# Patient Record
Sex: FEMALE | Race: WHITE | NOT HISPANIC OR LATINO | Employment: UNEMPLOYED | URBAN - METROPOLITAN AREA
[De-identification: names, ages, dates, MRNs, and addresses within clinical notes are randomized per-mention and may not be internally consistent; named-entity substitution may affect disease eponyms.]

---

## 2022-07-30 ENCOUNTER — HOSPITAL ENCOUNTER (EMERGENCY)
Facility: HOSPITAL | Age: 52
Discharge: HOME | End: 2022-07-30
Attending: EMERGENCY MEDICINE | Admitting: EMERGENCY MEDICINE
Payer: COMMERCIAL

## 2022-07-30 ENCOUNTER — APPOINTMENT (OUTPATIENT)
Dept: CT IMAGING | Facility: HOSPITAL | Age: 52
End: 2022-07-30
Attending: EMERGENCY MEDICINE
Payer: COMMERCIAL

## 2022-07-30 VITALS
HEIGHT: 65 IN | TEMPERATURE: 98.9 F | RESPIRATION RATE: 18 BRPM | OXYGEN SATURATION: 98 % | HEART RATE: 90 BPM | BODY MASS INDEX: 24.83 KG/M2 | WEIGHT: 149 LBS | DIASTOLIC BLOOD PRESSURE: 78 MMHG | SYSTOLIC BLOOD PRESSURE: 147 MMHG

## 2022-07-30 DIAGNOSIS — F10.920 ALCOHOLIC INTOXICATION WITHOUT COMPLICATION (H): ICD-10-CM

## 2022-07-30 LAB
AMPHETAMINES UR QL SCN: NORMAL
ANION GAP SERPL CALCULATED.3IONS-SCNC: 12 MMOL/L (ref 5–18)
BARBITURATES UR QL: NORMAL
BASOPHILS # BLD AUTO: 0 10E3/UL (ref 0–0.2)
BASOPHILS NFR BLD AUTO: 1 %
BENZODIAZ UR QL: NORMAL
BUN SERPL-MCNC: 5 MG/DL (ref 8–22)
CALCIUM SERPL-MCNC: 8.5 MG/DL (ref 8.5–10.5)
CANNABINOIDS UR QL SCN: NORMAL
CHLORIDE BLD-SCNC: 109 MMOL/L (ref 98–107)
CO2 SERPL-SCNC: 24 MMOL/L (ref 22–31)
COCAINE UR QL: NORMAL
CREAT SERPL-MCNC: 0.72 MG/DL (ref 0.6–1.1)
CREAT UR-MCNC: 13 MG/DL
EOSINOPHIL # BLD AUTO: 0.1 10E3/UL (ref 0–0.7)
EOSINOPHIL NFR BLD AUTO: 1 %
ERYTHROCYTE [DISTWIDTH] IN BLOOD BY AUTOMATED COUNT: 13.8 % (ref 10–15)
ETHANOL SERPL-MCNC: 352 MG/DL
GFR SERPL CREATININE-BSD FRML MDRD: >90 ML/MIN/1.73M2
GLUCOSE BLD-MCNC: 96 MG/DL (ref 70–125)
HCG SERPL QL: NEGATIVE
HCT VFR BLD AUTO: 43.6 % (ref 35–47)
HGB BLD-MCNC: 15.3 G/DL (ref 11.7–15.7)
HOLD SPECIMEN: NORMAL
IMM GRANULOCYTES # BLD: 0 10E3/UL
IMM GRANULOCYTES NFR BLD: 0 %
LYMPHOCYTES # BLD AUTO: 1.9 10E3/UL (ref 0.8–5.3)
LYMPHOCYTES NFR BLD AUTO: 30 %
MCH RBC QN AUTO: 32.2 PG (ref 26.5–33)
MCHC RBC AUTO-ENTMCNC: 35.1 G/DL (ref 31.5–36.5)
MCV RBC AUTO: 92 FL (ref 78–100)
METHADONE UR QL SCN: NORMAL
MONOCYTES # BLD AUTO: 0.3 10E3/UL (ref 0–1.3)
MONOCYTES NFR BLD AUTO: 5 %
NEUTROPHILS # BLD AUTO: 4.2 10E3/UL (ref 1.6–8.3)
NEUTROPHILS NFR BLD AUTO: 63 %
NRBC # BLD AUTO: 0 10E3/UL
NRBC BLD AUTO-RTO: 0 /100
OPIATES UR QL SCN: NORMAL
OXYCODONE UR QL: NORMAL
PCP UR QL SCN: NORMAL
PLATELET # BLD AUTO: 302 10E3/UL (ref 150–450)
POTASSIUM BLD-SCNC: 3.4 MMOL/L (ref 3.5–5)
RBC # BLD AUTO: 4.75 10E6/UL (ref 3.8–5.2)
SARS-COV-2 RNA RESP QL NAA+PROBE: NEGATIVE
SODIUM SERPL-SCNC: 145 MMOL/L (ref 136–145)
WBC # BLD AUTO: 6.5 10E3/UL (ref 4–11)

## 2022-07-30 PROCEDURE — 87635 SARS-COV-2 COVID-19 AMP PRB: CPT | Performed by: EMERGENCY MEDICINE

## 2022-07-30 PROCEDURE — 90791 PSYCH DIAGNOSTIC EVALUATION: CPT

## 2022-07-30 PROCEDURE — 70450 CT HEAD/BRAIN W/O DYE: CPT

## 2022-07-30 PROCEDURE — 96360 HYDRATION IV INFUSION INIT: CPT

## 2022-07-30 PROCEDURE — 80048 BASIC METABOLIC PNL TOTAL CA: CPT | Performed by: EMERGENCY MEDICINE

## 2022-07-30 PROCEDURE — 82077 ASSAY SPEC XCP UR&BREATH IA: CPT | Performed by: EMERGENCY MEDICINE

## 2022-07-30 PROCEDURE — 96361 HYDRATE IV INFUSION ADD-ON: CPT

## 2022-07-30 PROCEDURE — 36415 COLL VENOUS BLD VENIPUNCTURE: CPT | Performed by: EMERGENCY MEDICINE

## 2022-07-30 PROCEDURE — C9803 HOPD COVID-19 SPEC COLLECT: HCPCS

## 2022-07-30 PROCEDURE — 85025 COMPLETE CBC W/AUTO DIFF WBC: CPT | Performed by: EMERGENCY MEDICINE

## 2022-07-30 PROCEDURE — 250N000013 HC RX MED GY IP 250 OP 250 PS 637: Performed by: EMERGENCY MEDICINE

## 2022-07-30 PROCEDURE — 80307 DRUG TEST PRSMV CHEM ANLYZR: CPT | Performed by: EMERGENCY MEDICINE

## 2022-07-30 PROCEDURE — 84703 CHORIONIC GONADOTROPIN ASSAY: CPT | Performed by: EMERGENCY MEDICINE

## 2022-07-30 PROCEDURE — 258N000003 HC RX IP 258 OP 636: Performed by: EMERGENCY MEDICINE

## 2022-07-30 PROCEDURE — 93005 ELECTROCARDIOGRAM TRACING: CPT | Performed by: EMERGENCY MEDICINE

## 2022-07-30 PROCEDURE — 99285 EMERGENCY DEPT VISIT HI MDM: CPT | Mod: 25

## 2022-07-30 RX ORDER — FOLIC ACID 1 MG/1
1 TABLET ORAL ONCE
Status: COMPLETED | OUTPATIENT
Start: 2022-07-30 | End: 2022-07-30

## 2022-07-30 RX ORDER — IBUPROFEN 600 MG/1
600 TABLET, FILM COATED ORAL ONCE
Status: COMPLETED | OUTPATIENT
Start: 2022-07-30 | End: 2022-07-30

## 2022-07-30 RX ADMIN — IBUPROFEN 600 MG: 600 TABLET ORAL at 21:18

## 2022-07-30 RX ADMIN — THIAMINE HCL TAB 100 MG 100 MG: 100 TAB at 17:46

## 2022-07-30 RX ADMIN — FOLIC ACID 1 MG: 1 TABLET ORAL at 17:46

## 2022-07-30 RX ADMIN — SODIUM CHLORIDE 1000 ML: 9 INJECTION, SOLUTION INTRAVENOUS at 17:39

## 2022-07-30 NOTE — ED PROVIDER NOTES
EMERGENCY DEPARTMENT ENCOUNTER      NAME: Holly Briseno  AGE: 52 year old female  YOB: 1970  MRN: [id number]  EVALUATION DATE & TIME: 2022  4:45 PM    PCP: No primary care provider on file.    ED PROVIDER: Michaelle Watson M.D.      Chief Complaint   Patient presents with     Suicidal     Alcohol Intoxication         FINAL IMPRESSION:  1. Alcoholic intoxication without complication (H)          ED COURSE & MEDICAL DECISION MAKING:    ED Course as of 22   Sat  Patient with recent EtOH abuse with h/o alcoholism with fall 2 weeks ago in the shower, CT head pending, and EtOH abuse ongoing with desire to die from alcohol use,   3 weeks from EtOH abuse, no other substances except for vaping. EtOH level pending, DEC paged, CT head pending, IVF and thiamine/folic acid underway, no signs of withdrawal present at this time reassuringly. Will closely monitor in ED, patient here voluntarily but holdable at this time.    Hcg negative, COVID19 negative and BMP and CBC WNL. EtOH 352, Utox negative, CT head negative. Awaiting sobriety and then plan to do DEC assessment with passive SI. Patient signed out to PM ED MD pending DEC disposition when patient becomes moreso sober.    I spoke with DEC  who performed full assessment of patient and notes patient without SI/HI, does want to come off of alcohol but not specifically by amdission/detox and wants to be disccharged to home, very forward thinking and DEC  spoke with family who provided collateral and will ensure safety, patient declined offered admission, DEC believes patient not holdable and should be discharged with outpatient follow up provided. Patient discharged after being provided with extensive anticipatory guidance and given return precautions, importance of PMD follow-up emphasized.       4:47 PM I met with the patient to gather history and to perform my initial exam. I discussed the plan  "for care while in the Emergency Department.      Pertinent Labs & Imaging studies reviewed. (See chart for details)    N95 worn  A face shield was worn also  COVID PPE      At the conclusion of the encounter I discussed the results of all of the tests and the disposition. The questions were answered. The patient or family acknowledged understanding and was agreeable with the care plan.     MEDICATIONS GIVEN IN THE EMERGENCY:  Medications   0.9% sodium chloride BOLUS (0 mLs Intravenous Stopped 7/30/22 2125)   thiamine (B-1) tablet 100 mg (100 mg Oral Given 7/30/22 1746)   folic acid (FOLVITE) tablet 1 mg (1 mg Oral Given 7/30/22 1746)   ibuprofen (ADVIL/MOTRIN) tablet 600 mg (600 mg Oral Given 7/30/22 2118)       NEW PRESCRIPTIONS STARTED AT TODAY'S ER VISIT  New Prescriptions    No medications on file          =================================================================    HPI      Holly Briseno is a 52 year old female with PMHx of alcoholism who presents to the ED today via EMS from home with suicidal ideation and alcohol intoxication.    Per EMS, they were called to patient's house where she lives with her daughter. Patient's  3 weeks ago passed away from alcoholism. Since she has been heavily drinking and has been making statements of wanting to die.     Per patient, she was a recovered alcoholic who relapsed a couple of months ago. However, following the passing of her  about 3 weeks ago, the patient started to drink heavier and daily. Currently she states that she is drinking about 1 quart of Fireball a day. Her last drink was a few hours ago. Patient states that she has a history of admission related to withdrawal and she has experienced tremors when she doesn't drink. No vomiting or seizures. Currently patient is reporting that she has a headache and that \"my brain hurts\". Patient states she has started to drink in hopes of killing herself but she called EMS today because \"she can't keep " "going like this\" and \"I need help\".     Of note, patient states that she fell in the shower about 2 weeks ago and hit her head. She did not get imaging at the time. Denies fever, cough, or additional medical concerns or complaints at this time.     She endorses some vaping, but no other recreational drug use. Patient denies any other self harm. She states she has Fluoxitine as needed for anxiety, but has not been taking it. She is currently not seeing a counselor or therapist.       REVIEW OF SYSTEMS   All other systems reviewed and are negative except as noted above in HPI.    PAST MEDICAL HISTORY:  History reviewed. No pertinent past medical history.    PAST SURGICAL HISTORY:  History reviewed. No pertinent surgical history.    CURRENT MEDICATIONS:    No current outpatient medications on file.      ALLERGIES:  No Known Allergies    FAMILY HISTORY:  History reviewed. No pertinent family history.    SOCIAL HISTORY:        VITALS:  Patient Vitals for the past 24 hrs:   BP Temp Temp src Pulse Resp SpO2 Height Weight   07/30/22 1708 (!) 143/88 98.9  F (37.2  C) Oral 87 18 (!) 87 % 1.651 m (5' 5\") 67.6 kg (149 lb)       PHYSICAL EXAM    GENERAL: Awake, alert.  In no acute distress.   HEENT: Normocephalic, atraumatic.  Pupils equal, round and reactive.  Conjunctiva normal.  EOMI.  NECK: No stridor or apparent deformity.  EXTREMITIES: No lower extremity swelling or edema.    NEURO: Alert and oriented to person, place and time.  Cranial nerves grossly intact.  No focal motor deficit.  PSYCH: Normal mood and intoxicated affect  SKIN: No rashes      LAB:  All pertinent labs reviewed and interpreted.  Results for orders placed or performed during the hospital encounter of 07/30/22   CT Head w/o Contrast    Impression    IMPRESSION:  1.  Normal head CT.   Basic metabolic panel   Result Value Ref Range    Sodium 145 136 - 145 mmol/L    Potassium 3.4 (L) 3.5 - 5.0 mmol/L    Chloride 109 (H) 98 - 107 mmol/L    Carbon Dioxide " (CO2) 24 22 - 31 mmol/L    Anion Gap 12 5 - 18 mmol/L    Urea Nitrogen 5 (L) 8 - 22 mg/dL    Creatinine 0.72 0.60 - 1.10 mg/dL    Calcium 8.5 8.5 - 10.5 mg/dL    Glucose 96 70 - 125 mg/dL    GFR Estimate >90 >60 mL/min/1.73m2   Ethyl Alcohol Level   Result Value Ref Range    Alcohol, Blood 352 (H) None detected mg/dL   Asymptomatic COVID-19 Virus (Coronavirus) by PCR Nasopharyngeal    Specimen: Nasopharyngeal; Swab   Result Value Ref Range    SARS CoV2 PCR Negative Negative   HCG qualitative Blood   Result Value Ref Range    hCG Serum Qualitative Negative Negative   Drugs of Abuse 1+ Panel, Urine ([facility] Only)   Result Value Ref Range    Amphetamines Urine Screen Negative Screen Negative    Benzodiazepines Urine Screen Negative Screen Negative    Opiates Urine Screen Negative Screen Negative    PCP Urine Screen Negative Screen Negative    Cannabinoids Urine Screen Negative Screen Negative    Barbiturates Urine Screen Negative Screen Negative    Cocaine Urine Screen Negative Screen Negative    Methadone Urine Screen Negative Screen Negative    Oxycodone Urine Screen Negative Screen Negative    Creatinine Urine mg/dL 13 mg/dL   CBC with platelets and differential   Result Value Ref Range    WBC Count 6.5 4.0 - 11.0 10e3/uL    RBC Count 4.75 3.80 - 5.20 10e6/uL    Hemoglobin 15.3 11.7 - 15.7 g/dL    Hematocrit 43.6 35.0 - 47.0 %    MCV 92 78 - 100 fL    MCH 32.2 26.5 - 33.0 pg    MCHC 35.1 31.5 - 36.5 g/dL    RDW 13.8 10.0 - 15.0 %    Platelet Count 302 150 - 450 10e3/uL    % Neutrophils 63 %    % Lymphocytes 30 %    % Monocytes 5 %    % Eosinophils 1 %    % Basophils 1 %    % Immature Granulocytes 0 %    NRBCs per 100 WBC 0 <1 /100    Absolute Neutrophils 4.2 1.6 - 8.3 10e3/uL    Absolute Lymphocytes 1.9 0.8 - 5.3 10e3/uL    Absolute Monocytes 0.3 0.0 - 1.3 10e3/uL    Absolute Eosinophils 0.1 0.0 - 0.7 10e3/uL    Absolute Basophils 0.0 0.0 - 0.2 10e3/uL    Absolute Immature Granulocytes 0.0 <=0.4 10e3/uL    Absolute  NRBCs 0.0 10e3/uL   Extra Blue Top Tube   Result Value Ref Range    Hold Specimen JIC    Extra Red Top Tube   Result Value Ref Range    Hold Specimen JIC    Extra Green Top (Lithium Heparin) Tube   Result Value Ref Range    Hold Specimen JIC    Extra Purple Top Tube   Result Value Ref Range    Hold Specimen JIC        RADIOLOGY:  Reviewed all pertinent imaging. Please see official radiology report.  CT Head w/o Contrast   Final Result   IMPRESSION:   1.  Normal head CT.            EKG:    Reviewed and interpreted as: July 30, 2022 at 17:25 with sinus rhythm with occasional premature ventricular complexes, heart rate of 98 bpm, QTC of 490 ms. No previous EKGs for comparison.       I have independently reviewed and interpreted the EKG(s) documented above.        I, Roopa Koch, am serving as a scribe to document services personally performed by Dr. Michaelle Watson based on my observation and the provider's statements to me. I, Michaelle Watson MD attest that Roopa Koch is acting in a scribe capacity, has observed my performance of the services and has documented them in accordance with my direction.     Michaelle Watson MD  07/30/22 2053       Michaelle Watson MD  07/30/22 2126

## 2022-07-30 NOTE — ED TRIAGE NOTES
"Presents via [facility] from home for c/o SI and ETOH problem.  Pt lost her  to alcoholism 3 weeks ago, drinking since.  Pt is in \"between the stages of not wanting to live and not wanting to die.\"  Pt has a plan to die by drinking.  Lives with dtr Mehnaz [phone].         Triage Assessment     Row Name 07/30/22 1965       Triage Assessment (Adult)    Airway WDL WDL       Respiratory WDL    Respiratory WDL WDL       Skin Circulation/Temperature WDL    Skin Circulation/Temperature WDL WDL       Cardiac WDL    Cardiac WDL WDL       Peripheral/Neurovascular WDL    Peripheral Neurovascular WDL WDL       Cognitive/Neuro/Behavioral WDL    Cognitive/Neuro/Behavioral WDL WDL              "

## 2022-07-30 NOTE — ED NOTES
Bed: JNED-08  Expected date: 7/30/22  Expected time: 4:31 PM  Means of arrival: Ambulance  Comments:  [facility] - 52yof SI, ETOH

## 2022-07-30 NOTE — ED NOTES
Pt's cash counted (w/ witness security and ONEIL Woodruff).  Purse/wallet/cash/cards/check book sent with security.

## 2022-07-31 LAB
ATRIAL RATE - MUSE: 98 BPM
DIASTOLIC BLOOD PRESSURE - MUSE: NORMAL MMHG
INTERPRETATION ECG - MUSE: NORMAL
P AXIS - MUSE: 55 DEGREES
PR INTERVAL - MUSE: 160 MS
QRS DURATION - MUSE: 88 MS
QT - MUSE: 384 MS
QTC - MUSE: 490 MS
R AXIS - MUSE: 10 DEGREES
SYSTOLIC BLOOD PRESSURE - MUSE: NORMAL MMHG
T AXIS - MUSE: 58 DEGREES
VENTRICULAR RATE- MUSE: 98 BPM

## 2022-07-31 NOTE — DISCHARGE INSTRUCTIONS
Aftercare Plan  If I am feeling unsafe or I am in a crisis, I will:   Contact my established care providers   Call the National Suicide Prevention Lifeline: 988  Go to the nearest emergency room   Call 911     Warning signs that I or other people might notice when a crisis is developing for me: difficulty completing tasks, alcohol abuse, suicidal thoughts     Things I am able to do on my own to cope or help me feel better: Try to keep a daily routine that I enjoy including walking, biking, and Bible study    Things that I am able to do with others to cope or help me better: Spend time with children, attend Voodoo support groups      Things I can use or do for distraction: spend time with family and friends, walking, biking, and Bible study     Changes I can make to support my mental health and wellness: keep a healthy sleep schedule and eat nutritious food     People in my life that I can ask for help: children, friends from Voodoo and neighborhood     Your [facility] has a mental health crisis team you can call 24/7: [facility]  [phone]      Additional resources and information:  We discussed options for appointments with psychiatry, therapy, and a CD evaluation. At this time, you prefer to follow up with [facility].  It is recommended that you follow up with them and if you need assistance scheduling an appointment, you may also call or [facility] coordinators at [phone].  Please continue to attend the support groups that you find helpful.        Crisis Lines  Crisis Text Line  Text 853609  You will be connected with a trained live crisis counselor to provide support.    Por traeanol, texto  DELBERT a 808357 o texto a 442-AYUDAME en WhatsApp    The [place] [employer] (LGBTQ Youth Crisis Line)  6.725.891.9253  text START to 328-817      Community Resources  Fast Tracker  Linking people to mental health and substance use disorder resources  StepOne Healthn.org     [facility]  "Health Warm Line  Peer to peer support  Monday thru Saturday, 12 pm to 10 pm  [phone] or 8.867.934.5386  Text \"Support\" to 41099    National [place] on Mental Illness (MARBIN)  803.020.3663 or 1.888.MARBIN.HELPS      Mental Health Apps  My3  https://GridMarkets.org/    VirtualHopeBox  https://Redicam/apps/virtual-hope-box/    Substance Abuse Resources    To access substance abuse treatment you must have an assessment complete or have an updated assessment within 30 days of starting any program.  Information for this to be completed and to secure funding if you have medical assistance or no insurance can be found through your County's chemical health intake line.    If you have private insurance, call the customer service number on the back of your insurance card to find an in-network provider for substance abuse assessment. The ideal provider will be a treatment facility, licensed in the [facility].    For those with Medicaid with the [facility], you will need a Rule 25 assessment: The following are phone numbers for each [facility]. Rule 25 assessments must be completed by the county you reside in currently. Once approved for funding you can connect with a facility that does Rule 25 assessment.  [facility] [phone]  [facility] [phone]  [facility] [phone]  [facility] [phone]  [facility] [phone]  [facility] [phone]  [place] - [phone]  [facility] [phone]    The following facilities offer Rule 25/chemical health assessments:    [facility]  [phone]  Mon-Friday: [address], [address]  Saturday:  [address] [facility], [address]  M-F assessments (7a-1:45p); Saturday assessments (7:45-10:45a)    [facility]  [phone]  [address] [place], [place], [address]  *by appointment only M-W; walk ins available Fridays from 10-2.    [facility]  [phone] (phone consultation available 24/7)  In-person Assessments:  [address], [place] [address]  [address] [address] [address]  [address] [facility], [place] [address]  [address] [place] [address]     [facility]  [phone]  [address] [facility] [address] [address] [facility]  [place], [place], [address]  *walk in and appointments available by calling    [facility]  [phone] [address] [place], [place], [address]  *by appointment only M-Th    [facility]  [phone]  [address] [place], [place], [address]  *walk ins available M-F    [facility]  [phone]  [address] [place], [place], [address]  *available by appointments only      [facility]  [phone]  [address] [place], [place], [address]  *available by appointment only      [facility]  [phone]  [facility]  [address] [place], [place], [address]    [facility] ([facility])  [phone]  [address] [place] [address], [address]  *Walk in assessments also available M-F starting at 8 am.    [facility]  [phone]  [address] [place], [place], [address]    *available by appointments      [facility]  [phone]  [address] [place], [place], [address]  *walk in assessments available M-F starting at 7 am.    Critical access hospital [facility]  [phone]  [facility]  [address] [place], [address]  *Walk in assessments availble M-F starting at 8 am OR [phone]    [facility]  [address] [place] [address]  *Walk in assessments available M-F starting at 8 am    [facility]  [phone]  [address] [place] [address]    [facility]  [phone]  [address] [place], [place], [address]    *available by appointment only    [facility]  [phone]  [address] [place], [address]    [facility]  [phone]  [address]  [place], [address]  *available by appointment    [facility]  [phone]  [address]  [place], [address]  *walk ins available M-TH from 9-3    [facility]  [phone]  [address]  [place], [address]    [place]  [phone] [address] [place] [address]  [address] [address] [address]    [facility] (Does Rule 25 Assessments)  [email or web address]/  [phone]  [address] [place] [address], [address], [place] [address]    [facility]  https://www.LoopIt.CaseRails/our-services/drug-alcohol-treatment  [phone], [address], [place] [address]  [phone], [address], [place] [address]  [phone], [address] [facility], [place], [place] [address] [phone], [address] [facility] , [address], ([facility]), [place], [place] [address] [phone], [address] [place] [address]      If you are intoxicated, you may be required to detox at a detox facility before starting treatment. The following are detox facilities that you can self present to. All detox facilities are able to help you complete an assessment/rule 25 prior to discharge if you choose:      [facility]: [address] [place], [place], [address].         [phone]    [facility]: [address] [place], [place], [address]  [phone]    [facility]: [address] [place], [place], [address] [phone]    [facility]: [address] [place], [place], [address] [phone]      It is recommended that you abstain from all mood altering chemicals. Please contact the sober support hotline (140-160-0159) as needed; phones are answered 24 hours a day, 7 days a week. Other support hotlines include:    [facility] Mental Health & Addiction: [phone]    Ways to help cope with sobriety:    -- Take  prescribed medicines as scheduled  -- Keep follow-up appointments  -- Talk to others about your concerns  -- Get regular exercise    -- Practice deep breathing skills  -- Eat a healthy diet  -- Use community resources, including hotline numbers, [facility] crisis and support meetings  -- Stay sober and avoid places/people/things associated with substance use    --Maintain a daily schedule/routine    --Get at least 7-8 hours of sleep per night    --Create a list 10--20 healthy activities that you can do that are enjoyable and do not involve substance use    --Create daily goals (approx. 1-4 goals) per day and work to achieve them throughout the day.       [facility] ([facility])  [facility] connects people seeking recovery to resources that help foster and sustain long-term recovery. Whether you are seeking resources for treatment, transportation, housing, job training, education, health care or other pathways to recovery, [facility] is a great place to start.    Phone: 311.792.3856. www.[place]ParQnow.[employer] (Great listing of all types of recovery and non-recovery related resources)       Alcoholics Anonymous    Phone: 9-062-ALCOHOL    Website: HTTP://WWW.AA.ORG/      [employer] [place] (641-334-0051 or http://aaGuide FinancialneaLiveWire Tax.org)    [employer] [place] (414-143-8196 or www.aasaul.org)      Narcotics Anonymous    Phone: 870.345.5328    Website: www.Umoove.Medichanical Engineering.         [employer] "[employer]    [address] [place] [address], [address], [place], [place],    Phone: [phone]    Drop-in Hours: Monday-Friday 9-11:30 am. By appointment at other times.    Provides: [facility] is a drop-in center on the east side [facility] that provides a safe space for individuals who are homeless and have a history of chemical use. Sobriety is not a requirement but drugs and alcohol are not allowed on the property.    Services: Non-clients can access drop-in services such as Recovery and Harm Reduction Groups, referrals to case management, community  activities, shower facilities, and a pool table. Individuals who are homeless and have chemical health needs may be eligible for enrollment into [facility]'s case management program. Clients and  work together to access benefits, treatment, health care, shelter, and external housing resources.    [facility] Chemical Assessment & Referral Unit    [address] [place], [place]    Phone: [phone]    Hours: Monday-Friday 8 am-5 pm.    Provides: Rule 25 assessment and referral for individuals seeking treatment or counseling for chemical dependency. Must be a resident of [facility]. There is no fee for assessment. There is some funding available for treatment programs.      [facility]    [address] [facility]    Intake Phone: [phone] Main: [phone]    Hours: Monday through Friday 7 am-5 pm    Provides: Daily drop-in Rule 25 assessments and weekly mental health assessments and services. Outpatient chemical dependency treatment (with sober recovery housing), relapse prevention, case management, and employment services. Outpatient and residential treatment for women with children. Specializes in assisting individuals with a history of relapse who face multiple barriers to achieving stable recovery.    Remarks: No charge for most services or services can be billed through insurance. Gender-specific services and treatment for co-occurring substance abuse and mental health concerns offered.      Additional Information  Today you were seen by a licensed mental health professional through Triage and Transition services, [facility] ([facility])  for a crisis assessment in the Emergency Department at [facility].  It is recommended that you follow up with your established providers (psychiatrist, mental health therapist, and/or primary care doctor - as relevant) as soon as possible. Coordinators from P will be calling you in the next 24-48 hours to ensure that  you have the resources you need.  You can also contact [facility] coordinators directly at [phone]. You may have been scheduled for or offered an appointment with a mental health provider. [facility] maintains an extensive network of licensed behavioral health providers to connect patients with the services they need.  We do not charge providers a fee to participate in our referral network.  We match patients with providers based on a patient's specific needs, insurance coverage, and location.  Our first effort will be to refer you to a provider within your care system, and will utilize providers outside your care system as needed.

## 2022-07-31 NOTE — CONSULTS
Diagnostic Evaluation Consultation  Crisis Assessment    Patient was assessed: remote  Patient location: [facility] ED   Was a release of information signed: Yes. Providers included on the release: general ALEC was faxed to the ED      Referral Data and Chief Complaint  Holly Briseno is a 52 year old, who uses she/her pronouns, and presents to the ED via EMS. Patient is referred to the ED by self. Patient is presenting to the ED for the following concerns: Pt called 911 on herself due to alcohol intoxication and suicidal ideation.      Informed Consent and Assessment Methods     Patient is her own guardian. Writer met with patient and explained the crisis assessment process, including applicable information disclosures and limits to confidentiality, assessed understanding of the process, and obtained consent to proceed with the assessment. Patient was observed to be able to participate in the assessment as evidenced by verbal consent . Assessment methods included conducting a formal interview with patient, review of medical records, collaboration with medical staff, and obtaining relevant collateral information from family and community providers when available..     Over the course of this crisis assessment provided reassurance, offered validation, engaged patient in problem solving and disposition planning and worked with patient on safety and aftercare planning. Patient's response to interventions was pt was calm, polite, and cooperative.  She answered all assessment questions.  She was alert and oriented.      Summary of Patient Situation       Pt reports being sober for awhile. She was  to her  for 30 days and then he  3 weeks ago during a seizure.  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  She indicated that she is grieving and also managing transitions such as moving back to an apartment with her daughter. Symptoms have been occurring since her 's death and she has been using alcohol daily,  approximately one pint of whiskey.   Pt is attending group therapy at her [employer] and also AA.  Pt was working with a mental health therapist for over one year at [facility] for anxiety. She stopped attending in April or May because the therapist stopped working at the agency.  Pt reports that she would like to work with providers at [facility] again. Pt reports difficulty completing tasks at home however notes that she is washing laundry, vacuuming, and caring for her cat.      Brief Psychosocial History    Pt's daughter resides with pt. Pt used to be her 's care giver and reports that now she needs to find a new purpose. She is moving out of their home since his children now own the property.   Pt reports she has chronic neck pain and is in the process of applying for disability. Pt reports that she has had difficulty functioning however she was able to wash clothing, take care of her cat, and vacuum.  Pt is [employer] and attends [employer] support groups. She also enjoys doing a daily Bible study at home.  She also enjoys walking and biking.  She is not a .  Pt reports she has good friends at [employer] and her neighborhood. Her children are also supportive.      Significant Clinical History    Pt reports February 2020 patient had a one week inpatient stay in [facility].  She reports that she was hospitalized for mental health symptoms associated with chronic pain. Pt completed CD treatment with [facility] on February 2020 for alcohol abuse. She worked with a mental health therapist for over one year for anxiety and stopped in April/May.  She would like to resume services at [facility].       Collateral Information  Collateral was obtained from pt's daughter Mehnaz by phone.  Her daughter said that she thinks it would be good for patient to stay at the hospital however also indicated that if pt wants to go home, she does not have any concerns with this plan.   "     Risk Assessment  ESS-6  1.a. Over the past 2 weeks, have you had thoughts of killing yourself? No \"just depressed and I know it is the alcohol\". Epic records note pt has thoughts of dying by alcohol poisoning.    1.b. Have you ever attempted to kill yourself and, if yes, when did this last happen? No   2. Recent or current suicide plan? No   3. Recent or current intent to act on ideation? No  4. Lifetime psychiatric hospitalization? Yes  5. Pattern of excessive substance use? Yes  6. Current irritability, agitation, or aggression? No  Scoring note: BOTH 1a and 1b must be yes for it to score 1 point, if both are not yes it is zero. All others are 1 point per number. If all questions 1a/1b - 6 are no, risk is negligible. If one of 1a/1b is yes, then risk is mild. If either question 2 or 3, but not both, is yes, then risk is automatically moderate regardless of total score. If both 2 and 3 are yes, risk is automatically high regardless of total score.      Score: 2, moderate risk      Does the patient have access to lethal means? No     Does the patient engage in non-suicidal self-injurious behavior (NSSI/SIB)? no     Does the patient have thoughts of harming others? No     Is the patient engaging in sexually inappropriate behavior?  no        Current Substance Abuse     Is there recent substance abuse? Pt reports daily alcohol use for the past 3 weeks. Pt reports drinking a pint of whiskey.       Was a urine drug screen or blood alcohol level obtained: Yes see Epic        Mental Status Exam     Affect: Appropriate   Appearance: Appropriate    Attention Span/Concentration: Attentive  Eye Contact: Engaged   Fund of Knowledge: Appropriate    Language /Speech Content: Fluent   Language /Speech Volume: Normal    Language /Speech Rate/Productions: Articulate    Recent Memory: Intact   Remote Memory: Intact   Mood: Normal    Orientation to Person: Yes    Orientation to Place: Yes   Orientation to Time of Day: Yes  "   Orientation to Date: Yes    Situation (Do they understand why they are here?): Yes    Psychomotor Behavior: Normal    Thought Content: Clear   Thought Form: Intact      History of commitment: No         Medication    Psychotropic medications: No  Medication changes made in the last two weeks: No       Current Care Team    Primary Care Provider: No  Psychiatrist: No  Therapist: No, however pt was working with a therapist for 1.5 years until the therapist moved offices   : No     CTSS or ARMHS: No  ACT Team: No  Other: No      Diagnosis    Substance-Related & Addictive Disorders Alcohol Intoxication  303.00 (F10.129) Alcohol Intoxication with use disorder, Moderate or severe , primary and by history   300.02 (F41.1) Generalized Anxiety Disorder - by history     Clinical Summary and Substantiation of Recommendations    Pt presents to the ED after calling 911 on herself due to alcohol intoxication and suicidal thoughts.  Pt was alert and oriented at the time of the DEC assessment. She is experiencing grief regarding the death of her .  She expressed future oriented thinking and indicates that she has a good support system and motivation to follow up with them. She developed a safety plan and agrees to follow it.  Pt declined assistance scheduling outpatient therapy, psychiatry or a CD evaluation.  She would like to follow up with [facility] herself for resources.  Her last appointment with them was in April or May and she worked with a therapist for over a year.  She also plans to continue to attend  and her [employer] support group.    Disposition    Recommended disposition: Individual Therapy, Medication Management and Rule 25/SUE Assessment       Reviewed case and recommendations with attending provider. Attending Name: Dr. Watson       Attending concurs with disposition: Yes       Patient concurs with disposition: Pt agrees that working with a therapist would be beneficial.  She said  that if she would like to work with a psychiatrist, she will ask the therapist for a referral.  At this time, pt feels supported by RITESH and her [employer] group for chemical health needs.  She plans to follow up with [facility].         Guardian concurs with disposition: NA      Final disposition: Individual therapy . Pt to follow up.     Inp     Outpatient Details (if applicable):   Aftercare plan and appointments placed in the AVS and provided to patient: Yes. Given to patient by ED staff    Was lethal means counseling provided as a part of aftercare planning? Yes - describe SUE resources were provided including information regarding abstaining from substances.  She denies access to firearms.        Assessment Details    Patient interview started at: 8:45pm and completed at: 9:07pm.     Total duration spent on the patient case in minutes: 1.0 hrs      CPT code(s) utilized: 42120 - Psychotherapy for Crisis - 60 (30-74*) min       IDALMIS Chong  DEC - Triage & Transition Services        Aftercare Plan  If I am feeling unsafe or I am in a crisis, I will:   Contact my established care providers   Call the National Suicide Prevention Lifeline: 988  Go to the nearest emergency room   Call 911     Warning signs that I or other people might notice when a crisis is developing for me: difficulty completing tasks, alcohol abuse, suicidal thoughts     Things I am able to do on my own to cope or help me feel better: Try to keep a daily routine that I enjoy including walking, biking, and Bible study    Things that I am able to do with others to cope or help me better: Spend time with children, attend [employer] support groups      Things I can use or do for distraction: spend time with family and friends, walking, biking, and Bible study     Changes I can make to support my mental health and wellness: keep a healthy sleep schedule and eat nutritious food     People in my life that I can ask for help: children, friends  "from [employer] and neighborhood     Your Critical access hospital has a mental health crisis team you can call 24/7: [facility] Mobile Crisis  [phone]      Additional resources and information:  We discussed options for appointments with psychiatry, therapy, and a CD evaluation. At this time, you prefer to follow up with [facility].  It is recommended that you follow up with them and if you need assistance scheduling an appointment, you may also call or [facility] coordinators at [phone].  Please continue to attend the support groups that you find helpful.        Crisis Lines  Crisis Text Line  Text 025561  You will be connected with a trained live crisis counselor to provide support.    Por lori, texto  DELBERT a 923851 o texto a 442-AYUDAME en WhatsApp    The [place] [facility] (LGBTQ Youth Crisis Line)  8.190.848.8462  text START to 052-508      Community Resources  Fast Tracker  Linking people to mental health and substance use disorder resources  [employer].Giga[employer]te     [place] Mental Health Warm Line  Peer to peer support  Monday thru Saturday, 12 pm to 10 pm  658.942.1214 or 0.092.122.2121  Text \"Support\" to 23358    National Reserve on Mental Illness (MARBIN)  985.113.4727 or 1.888.MARBIN.HELPS      Mental Health Apps  My3  https://myParadigm Spinepp.org/    VirtualHopeBox  https://MeshApp.org/apps/virtual-hope-box/    Substance Abuse Resources    To access substance abuse treatment you must have an assessment complete or have an updated assessment within 30 days of starting any program.  Information for this to be completed and to secure funding if you have medical assistance or no insurance can be found through your [facility]'s Outski health intake line.    If you have private insurance, call the customer service number on the back of your insurance card to find an in-network provider for substance abuse assessment. The ideal provider will be a treatment facility, licensed in the state [facility].    For those " with Medicaid with the state of [place], you will need a Rule 25 assessment: The following are phone numbers for each [facility]. Rule 25 assessments must be completed by the county you reside in currently. Once approved for funding you can connect with a facility that does Rule 25 assessment.  [place] - [phone]  [place] - [phone]  [place] - [phone]  [place] - [phone]  [facility] [phone]  [facility] [phone]  [place] - [phone]  [facility] [phone]    The following facilities offer Rule 25/chemical health assessments:    [facility]  [phone]  Mon-Friday: [address] [facility], [address]  Saturday:  [address] [facility], [address]  M-F assessments (7a-1:45p); Saturday assessments (7:45-10:45a)    [facility]  [phone]  [address] [place], [place], [address]  *by appointment only M-W; walk ins available Fridays from 10-2.    [facility]  [phone] (phone consultation available 24/7)  In-person Assessments:  [address] [facility], [address], [place], [place] [address] [address] [place] [address]  [address] [place] [address]  [address] [place] [address]     [facility]  [phone]  [address] [facility]  [place], [place], [address]  *walk in and appointments available by calling    [facility]  [phone] [address] [place], [place], [address]  *by appointment only M-Th    [facility] Adult Mental Health  [phone]  [address] [place], [place], [address]  *walk ins available M-F    [facility]  [phone] [address] [place], [place], [address]  *available by appointments only      [facility]  [phone] [address] [place], [place], [address]  *available by appointment only      [facility]  [phone]  [facility] - Outpatient Mental Health and Addiction  [address] [place], [place], [address]    [facility] Outpatient  Alcohol and Drug Abuse Program (ADAP)  [phone]  [address] [facility] [address]  [place], [place], [address]  *Walk in assessments also available M-F starting at 8 am.    [facility]  [phone]  [address] [place], [place], [address]    *available by appointments      [facility]  [phone]  [address] [place], [place], [address]  *walk in assessments available M-F starting at 7 am.    [facility]  [phone]  [facility]  [address] [place], [place], [address]  *Walk in assessments availble M-F starting at 8 am OR [phone]    [facility]  [address] [place] [address]  *Walk in assessments available M-F starting at 8 am    [facility]  [phone]  [address] [place] [address]    [facility]  [phone]  [address] [place], [place], [address]    *available by appointment only    [facility]  [phone]  [address] [facility] [address]  [place], [place], [address]    [facility]  [phone]  [address] [place], [address]  *available by appointment    [facility]  [phone]  [address] [place], [place], [address]  *walk ins available M-TH from 9-3    [facility]  [phone]  [address] [place], [place], [address]    [facility]  [phone] [address] [place] [address] [address] [address]    [facility] (Does Rule 25 Assessments)  http://[email or web address].[email or web address]/  [phone]  [address], [place] [address]    [facility]  https://www.Banter!.com/our-services/drug-alcohol-treatment  [phone], [address], [place], [place] [id number] [phone], [address] [place] [address], [address], [place], [place] [address] [phone], [address], [place] [address] [phone], [address], ([facility]),  [place] [address]  [phone], [address] [address], [place] [address]      If you are intoxicated, you may be required to detox at a detox facility before starting treatment. The following are detox facilities that you can self present to. All detox facilities are able to help you complete an assessment/rule 25 prior to discharge if you choose:      [facility]: [address] [place], [place], [address].         [phone]    [facility]: [address] [place], [place], [address]  [phone]    [facility]: [address] [place], [place], [address] [phone]    [facility]: [address] [place], [place], [address] [phone]      It is recommended that you abstain from all mood altering chemicals. Please contact the sober support hotline (081-397-5147) as needed; phones are answered 24 hours a day, 7 days a week. Other support hotlines include:    [facility] Mental Health & Addiction: [phone]    Ways to help cope with sobriety:    -- Take prescribed medicines as scheduled  -- Keep follow-up appointments  -- Talk to others about your concerns  -- Get regular exercise    -- Practice deep breathing skills  -- Eat a healthy diet  -- Use community resources, including hotline numbers, [facility] crisis and support meetings  -- Stay sober and avoid places/people/things associated with substance use    --Maintain a daily schedule/routine    --Get at least 7-8 hours of sleep per night    --Create a list 10--20 healthy activities that you can do that are enjoyable and do not involve substance use    --Create daily goals (approx. 1-4 goals) per day and work to achieve them throughout the day.       [place] Recovery [facility] (MRC)  [facility] connects people seeking recovery to resources that help foster and sustain long-term recovery. Whether you are seeking resources for treatment, transportation, housing, job training, education, health care or other pathways to recovery,  [facility] is a great place to start.    Phone: 104.351.2032. www.[place]TheFanLeague.org (Great listing of all types of recovery and non-recovery related resources)       Alcoholics Anonymous    Phone: 3-898-BJMHEIE    Website: HTTP://WWW.AA.ORG/      AA [place] (639-852-8980 or http://aamin[place].org)    AA [place] (488-024-9232 or www.aastpaul.org)      Narcotics Anonymous    Phone: 582.459.2127    Website: www.[employer].StackEngine.         [employer] [address], [place], [place],    Phone: [phone]    Drop-in Hours: Monday-Friday 9-11:30 am. By appointment at other times.    Provides: [facility] is a drop-in center on the east side [facility] that provides a safe space for individuals who are homeless and have a history of chemical use. Sobriety is not a requirement but drugs and alcohol are not allowed on the property.    Services: Non-clients can access drop-in services such as Recovery and Harm Reduction Groups, referrals to case management, community activities, shower facilities, and a pool table. Individuals who are homeless and have chemical health needs may be eligible for enrollment into [facility]'s case management program. Clients and  work together to access benefits, treatment, health care, shelter, and external housing resources.    [facility] Chemical Assessment & Referral Unit    [address] [place], [place]    Phone: [phone]    Hours: Monday-Friday 8 am-5 pm.    Provides: Rule 25 assessment and referral for individuals seeking treatment or counseling for chemical dependency. Must be a resident of [facility]. There is no fee for assessment. There is some funding available for treatment programs.      [facility]    [address] [facility] Phone: [phone] Main: [phone]    Hours: Monday through Friday 7 am-5 pm    Provides: Daily drop-in Rule 25 assessments and weekly mental health assessments and  services. Outpatient chemical dependency treatment (with sober recovery housing), relapse prevention, case management, and employment services. Outpatient and residential treatment for women with children. Specializes in assisting individuals with a history of relapse who face multiple barriers to achieving stable recovery.    Remarks: No charge for most services or services can be billed through insurance. Gender-specific services and treatment for co-occurring substance abuse and mental health concerns offered.      Additional Information  Today you were seen by a licensed mental health professional through Triage and Transition services, [facility] ([facility])  for a crisis assessment in the Emergency Department at [facility].  It is recommended that you follow up with your established providers (psychiatrist, mental health therapist, and/or primary care doctor - as relevant) as soon as possible. Coordinators from [facility] will be calling you in the next 24-48 hours to ensure that you have the resources you need.  You can also contact [facility] coordinators directly at [phone]. You may have been scheduled for or offered an appointment with a mental health provider. [facility] maintains an extensive network of licensed behavioral health providers to connect patients with the services they need.  We do not charge providers a fee to participate in our referral network.  We match patients with providers based on a patient's specific needs, insurance coverage, and location.  Our first effort will be to refer you to a provider within your care system, and will utilize providers outside your care system as needed.

## 2022-08-11 ENCOUNTER — APPOINTMENT (OUTPATIENT)
Dept: CT IMAGING | Facility: HOSPITAL | Age: 52
End: 2022-08-11
Attending: EMERGENCY MEDICINE
Payer: COMMERCIAL

## 2022-08-11 ENCOUNTER — HOSPITAL ENCOUNTER (EMERGENCY)
Facility: HOSPITAL | Age: 52
Discharge: HOME | End: 2022-08-12
Attending: EMERGENCY MEDICINE | Admitting: EMERGENCY MEDICINE
Payer: COMMERCIAL

## 2022-08-11 DIAGNOSIS — F10.929 ALCOHOLIC INTOXICATION WITH COMPLICATION (H): ICD-10-CM

## 2022-08-11 DIAGNOSIS — R45.851 SUICIDAL IDEATION: ICD-10-CM

## 2022-08-11 DIAGNOSIS — F32.A DEPRESSION, UNSPECIFIED DEPRESSION TYPE: ICD-10-CM

## 2022-08-11 LAB
ALBUMIN SERPL-MCNC: 3.8 G/DL (ref 3.5–5)
ALP SERPL-CCNC: 61 U/L (ref 45–120)
ALT SERPL W P-5'-P-CCNC: 26 U/L (ref 0–45)
ANION GAP SERPL CALCULATED.3IONS-SCNC: 11 MMOL/L (ref 5–18)
AST SERPL W P-5'-P-CCNC: 22 U/L (ref 0–40)
BASOPHILS # BLD AUTO: 0.1 10E3/UL (ref 0–0.2)
BASOPHILS NFR BLD AUTO: 1 %
BILIRUB SERPL-MCNC: 0.1 MG/DL (ref 0–1)
BUN SERPL-MCNC: 6 MG/DL (ref 8–22)
CALCIUM SERPL-MCNC: 8.5 MG/DL (ref 8.5–10.5)
CHLORIDE BLD-SCNC: 110 MMOL/L (ref 98–107)
CO2 SERPL-SCNC: 26 MMOL/L (ref 22–31)
CREAT SERPL-MCNC: 0.73 MG/DL (ref 0.6–1.1)
EOSINOPHIL # BLD AUTO: 0.1 10E3/UL (ref 0–0.7)
EOSINOPHIL NFR BLD AUTO: 2 %
ERYTHROCYTE [DISTWIDTH] IN BLOOD BY AUTOMATED COUNT: 13.1 % (ref 10–15)
ETHANOL SERPL-MCNC: 394 MG/DL
GFR SERPL CREATININE-BSD FRML MDRD: >90 ML/MIN/1.73M2
GLUCOSE BLD-MCNC: 94 MG/DL (ref 70–125)
HCG SERPL QL: NEGATIVE
HCT VFR BLD AUTO: 43.8 % (ref 35–47)
HGB BLD-MCNC: 14.9 G/DL (ref 11.7–15.7)
IMM GRANULOCYTES # BLD: 0 10E3/UL
IMM GRANULOCYTES NFR BLD: 0 %
LYMPHOCYTES # BLD AUTO: 2.3 10E3/UL (ref 0.8–5.3)
LYMPHOCYTES NFR BLD AUTO: 27 %
MAGNESIUM SERPL-MCNC: 2.2 MG/DL (ref 1.8–2.6)
MCH RBC QN AUTO: 32.5 PG (ref 26.5–33)
MCHC RBC AUTO-ENTMCNC: 34 G/DL (ref 31.5–36.5)
MCV RBC AUTO: 96 FL (ref 78–100)
MONOCYTES # BLD AUTO: 0.5 10E3/UL (ref 0–1.3)
MONOCYTES NFR BLD AUTO: 6 %
NEUTROPHILS # BLD AUTO: 5.6 10E3/UL (ref 1.6–8.3)
NEUTROPHILS NFR BLD AUTO: 64 %
NRBC # BLD AUTO: 0 10E3/UL
NRBC BLD AUTO-RTO: 0 /100
PLATELET # BLD AUTO: 415 10E3/UL (ref 150–450)
POTASSIUM BLD-SCNC: 3.6 MMOL/L (ref 3.5–5)
PROT SERPL-MCNC: 7.1 G/DL (ref 6–8)
RBC # BLD AUTO: 4.58 10E6/UL (ref 3.8–5.2)
SODIUM SERPL-SCNC: 147 MMOL/L (ref 136–145)
WBC # BLD AUTO: 8.6 10E3/UL (ref 4–11)

## 2022-08-11 PROCEDURE — 36415 COLL VENOUS BLD VENIPUNCTURE: CPT | Performed by: EMERGENCY MEDICINE

## 2022-08-11 PROCEDURE — 80307 DRUG TEST PRSMV CHEM ANLYZR: CPT | Performed by: EMERGENCY MEDICINE

## 2022-08-11 PROCEDURE — 99285 EMERGENCY DEPT VISIT HI MDM: CPT | Mod: 25

## 2022-08-11 PROCEDURE — 84703 CHORIONIC GONADOTROPIN ASSAY: CPT | Performed by: EMERGENCY MEDICINE

## 2022-08-11 PROCEDURE — 85025 COMPLETE CBC W/AUTO DIFF WBC: CPT | Performed by: EMERGENCY MEDICINE

## 2022-08-11 PROCEDURE — 85610 PROTHROMBIN TIME: CPT | Performed by: EMERGENCY MEDICINE

## 2022-08-11 PROCEDURE — 82077 ASSAY SPEC XCP UR&BREATH IA: CPT | Performed by: EMERGENCY MEDICINE

## 2022-08-11 PROCEDURE — 70450 CT HEAD/BRAIN W/O DYE: CPT

## 2022-08-11 PROCEDURE — 80053 COMPREHEN METABOLIC PANEL: CPT | Performed by: EMERGENCY MEDICINE

## 2022-08-11 PROCEDURE — 83735 ASSAY OF MAGNESIUM: CPT | Performed by: EMERGENCY MEDICINE

## 2022-08-11 ASSESSMENT — ENCOUNTER SYMPTOMS
UNEXPECTED WEIGHT CHANGE: 1
SLEEP DISTURBANCE: 1
HEADACHES: 1
APPETITE CHANGE: 1
NERVOUS/ANXIOUS: 1

## 2022-08-11 ASSESSMENT — ACTIVITIES OF DAILY LIVING (ADL): ADLS_ACUITY_SCORE: 35

## 2022-08-12 VITALS
BODY MASS INDEX: 24.79 KG/M2 | SYSTOLIC BLOOD PRESSURE: 134 MMHG | TEMPERATURE: 98.5 F | RESPIRATION RATE: 16 BRPM | WEIGHT: 149 LBS | HEART RATE: 75 BPM | DIASTOLIC BLOOD PRESSURE: 69 MMHG | OXYGEN SATURATION: 100 %

## 2022-08-12 LAB
AMPHETAMINES UR QL SCN: NORMAL
BARBITURATES UR QL: NORMAL
BENZODIAZ UR QL: NORMAL
CANNABINOIDS UR QL SCN: NORMAL
COCAINE UR QL: NORMAL
CREAT UR-MCNC: 29 MG/DL
ETHANOL SERPL-MCNC: 225 MG/DL
INR PPP: 0.94 (ref 0.85–1.15)
METHADONE UR QL SCN: NORMAL
OPIATES UR QL SCN: NORMAL
OXYCODONE UR QL: NORMAL
PCP UR QL SCN: NORMAL

## 2022-08-12 PROCEDURE — 82077 ASSAY SPEC XCP UR&BREATH IA: CPT | Performed by: FAMILY MEDICINE

## 2022-08-12 PROCEDURE — 250N000013 HC RX MED GY IP 250 OP 250 PS 637: Performed by: EMERGENCY MEDICINE

## 2022-08-12 PROCEDURE — 36415 COLL VENOUS BLD VENIPUNCTURE: CPT | Performed by: FAMILY MEDICINE

## 2022-08-12 PROCEDURE — 90791 PSYCH DIAGNOSTIC EVALUATION: CPT

## 2022-08-12 RX ORDER — DOCUSATE SODIUM 100 MG/1
100 CAPSULE, LIQUID FILLED ORAL ONCE
Status: COMPLETED | OUTPATIENT
Start: 2022-08-12 | End: 2022-08-12

## 2022-08-12 RX ORDER — ACETAMINOPHEN 325 MG/1
650 TABLET ORAL ONCE
Status: COMPLETED | OUTPATIENT
Start: 2022-08-12 | End: 2022-08-12

## 2022-08-12 RX ORDER — LORAZEPAM 0.5 MG/1
1 TABLET ORAL ONCE
Status: COMPLETED | OUTPATIENT
Start: 2022-08-12 | End: 2022-08-12

## 2022-08-12 RX ORDER — CHLORDIAZEPOXIDE HYDROCHLORIDE 5 MG/1
25 CAPSULE, GELATIN COATED ORAL ONCE
Status: COMPLETED | OUTPATIENT
Start: 2022-08-12 | End: 2022-08-12

## 2022-08-12 RX ORDER — ACETAMINOPHEN 325 MG/1
650 TABLET ORAL ONCE
Status: DISCONTINUED | OUTPATIENT
Start: 2022-08-12 | End: 2022-08-12

## 2022-08-12 RX ADMIN — CHLORDIAZEPOXIDE HYDROCHLORIDE 25 MG: 5 CAPSULE ORAL at 14:06

## 2022-08-12 RX ADMIN — ACETAMINOPHEN 650 MG: 325 TABLET ORAL at 11:48

## 2022-08-12 RX ADMIN — DOCUSATE SODIUM 100 MG: 100 CAPSULE, LIQUID FILLED ORAL at 08:45

## 2022-08-12 RX ADMIN — LORAZEPAM 1 MG: 0.5 TABLET ORAL at 08:45

## 2022-08-12 ASSESSMENT — ENCOUNTER SYMPTOMS
AGITATION: 0
NUMBNESS: 0
CHEST TIGHTNESS: 0
VOMITING: 0
CHILLS: 0
FEVER: 0
SORE THROAT: 0
ABDOMINAL PAIN: 0
COUGH: 0
DYSPHORIC MOOD: 1
WEAKNESS: 0
NAUSEA: 0
HALLUCINATIONS: 0
RHINORRHEA: 0
DIARRHEA: 0
SHORTNESS OF BREATH: 0

## 2022-08-12 ASSESSMENT — ACTIVITIES OF DAILY LIVING (ADL)
ADLS_ACUITY_SCORE: 35

## 2022-08-12 NOTE — ED NOTES
[facility] ED Mental Health Handoff Note:     Assuming care from: Dr Leonard Lamar    Brief HPI: 52 year old female signed out to me by the above provider. See initial ED Provider note for full details of the presentation.   In brief, 52-year-old female with history of alcohol abuse, anxiety, depression, with increasing depression since her   a couple weeks ago.  Admits to suicidal ideation.    Home meds reviewed and ordered/administered: Yes  Medically stable for inpatient mental health admission: No. Awaiting lab results.  Evaluated by mental health: No. Awaiting clinical sobriety before evaluation.  Safety concerns: At the time I received sign out, there were no safety concerns.    Hold Status:  Active Orders   N/A     Labs/Imaging:   Results for orders placed or performed during the hospital encounter of 22 (from the past 24 hour(s))   Ethyl Alcohol Level     Status: Abnormal    Collection Time: 22 10:55 PM   Result Value Ref Range    Alcohol, Blood 394 (H) None detected mg/dL   CBC with platelets differential     Status: None    Collection Time: 22 10:55 PM    Narrative    The following orders were created for panel order CBC with platelets differential.  Procedure                               Abnormality         Status                     ---------                               -----------         ------                     CBC with platelets and d...[[id number]]                      Final result                 Please view results for these tests on the individual orders.   Comprehensive metabolic panel     Status: Abnormal    Collection Time: 22 10:55 PM   Result Value Ref Range    Sodium 147 (H) 136 - 145 mmol/L    Potassium 3.6 3.5 - 5.0 mmol/L    Chloride 110 (H) 98 - 107 mmol/L    Carbon Dioxide (CO2) 26 22 - 31 mmol/L    Anion Gap 11 5 - 18 mmol/L    Urea Nitrogen 6 (L) 8 - 22 mg/dL    Creatinine 0.73 0.60 - 1.10 mg/dL    Calcium 8.5 8.5 - 10.5 mg/dL     Glucose 94 70 - 125 mg/dL    Alkaline Phosphatase 61 45 - 120 U/L    AST 22 0 - 40 U/L    ALT 26 0 - 45 U/L    Protein Total 7.1 6.0 - 8.0 g/dL    Albumin 3.8 3.5 - 5.0 g/dL    Bilirubin Total 0.1 0.0 - 1.0 mg/dL    GFR Estimate >90 >60 mL/min/1.73m2   Magnesium     Status: Normal    Collection Time: 08/11/22 10:55 PM   Result Value Ref Range    Magnesium 2.2 1.8 - 2.6 mg/dL   INR     Status: Normal    Collection Time: 08/11/22 10:55 PM   Result Value Ref Range    INR 0.94 0.85 - 1.15   HCG qualitative Blood     Status: Normal    Collection Time: 08/11/22 10:55 PM   Result Value Ref Range    hCG Serum Qualitative Negative Negative   CBC with platelets and differential     Status: None    Collection Time: 08/11/22 10:55 PM   Result Value Ref Range    WBC Count 8.6 4.0 - 11.0 10e3/uL    RBC Count 4.58 3.80 - 5.20 10e6/uL    Hemoglobin 14.9 11.7 - 15.7 g/dL    Hematocrit 43.8 35.0 - 47.0 %    MCV 96 78 - 100 fL    MCH 32.5 26.5 - 33.0 pg    MCHC 34.0 31.5 - 36.5 g/dL    RDW 13.1 10.0 - 15.0 %    Platelet Count 415 150 - 450 10e3/uL    % Neutrophils 64 %    % Lymphocytes 27 %    % Monocytes 6 %    % Eosinophils 2 %    % Basophils 1 %    % Immature Granulocytes 0 %    NRBCs per 100 WBC 0 <1 /100    Absolute Neutrophils 5.6 1.6 - 8.3 10e3/uL    Absolute Lymphocytes 2.3 0.8 - 5.3 10e3/uL    Absolute Monocytes 0.5 0.0 - 1.3 10e3/uL    Absolute Eosinophils 0.1 0.0 - 0.7 10e3/uL    Absolute Basophils 0.1 0.0 - 0.2 10e3/uL    Absolute Immature Granulocytes 0.0 <=0.4 10e3/uL    Absolute NRBCs 0.0 10e3/uL   CT Head w/o Contrast     Status: None    Collection Time: 08/11/22 11:14 PM    Narrative    EXAM: CT HEAD W/O CONTRAST  LOCATION: [facility]  DATE/TIME: 8/11/2022 11:11 PM    INDICATION: Alleged intoxication. Headache.  COMPARISON: 7/30/2022.  TECHNIQUE: Routine CT Head without IV contrast. Multiplanar reformats. Dose reduction techniques were used.    FINDINGS:  INTRACRANIAL CONTENTS: No intracranial  hemorrhage, extraaxial collection, or mass effect.  No CT evidence of acute infarct. Normal parenchymal attenuation. Minimal generalized volume loss. No hydrocephalus.     VISUALIZED ORBITS/SINUSES/MASTOIDS: No intraorbital abnormality. No paranasal sinus mucosal disease. No middle ear or mastoid effusion.    BONES/SOFT TISSUES: No acute abnormality.      Impression    IMPRESSION:  1.   No acute intracranial process.   Urine Drugs of Abuse Screen Panel 1+ - Drug Screen plus Methadone     Status: None    Collection Time: 08/11/22 11:35 PM    Narrative    The following orders were created for panel order Urine Drugs of Abuse Screen Panel 1+ - Drug Screen plus Methadone.  Procedure                               Abnormality         Status                     ---------                               -----------         ------                     Drugs of Abuse 1+ Panel,...[[id number]]                      Final result                 Please view results for these tests on the individual orders.   Drugs of Abuse 1+ Panel, Urine ([facility] Only)     Status: None    Collection Time: 08/11/22 11:35 PM   Result Value Ref Range    Amphetamines Urine Screen Negative Screen Negative    Benzodiazepines Urine Screen Negative Screen Negative    Opiates Urine Screen Negative Screen Negative    PCP Urine Screen Negative Screen Negative    Cannabinoids Urine Screen Negative Screen Negative    Barbiturates Urine Screen Negative Screen Negative    Cocaine Urine Screen Negative Screen Negative    Methadone Urine Screen Negative Screen Negative    Oxycodone Urine Screen Negative Screen Negative    Creatinine Urine mg/dL 29 mg/dL    Narrative    Drug                           Screening Threshold    Amphetamines                    1000 ng/mL  Benzodiazepine                   200 ng/mL  Opiates                          300 ng/mL  Phencyclidine                     25 ng/mL  THC Metabolite                    50 ng/mL  Barbiturates                      200 ng/mL  Cocaine Metabolite               150 ng/mL  Methadone                        300 ng/mL  Oxycodone                        100 ng/mL    Screening results are to be used only for medical purposes.  Unconfirmed screening results are not to be used for non-  medical purposes.         ED Meds:  Medications - No data to display  CT Head w/o Contrast   Final Result   IMPRESSION:   1.   No acute intracranial process.          ED Course:       There were no significant events during my shift.    Patient was signed out to the oncoming provider at shift change, repeat alcohol level at 7 AM and mental health assessment if sober.    Impression:    ICD-10-CM    1. Alcoholic intoxication with complication (H)  F10.929    2. Suicidal ideation  R45.851    3. Depression, unspecified depression type  F32.A        Plan:    1. Awaiting mental health evaluation/recommendations.    Valdo Hightower MD  [facility] EMERGENCY DEPARTMENT  [address] [place] [id number]  [phone]                   Valdo Hightower MD  08/12/22 6326

## 2022-08-12 NOTE — ED PROVIDER NOTES
"Emergency Department Encounter      NAME: Holly Briseno  AGE: 52 year old female  YOB: 1970  MRN: [id number]  EVALUATION DATE & TIME: 2022  9:45 PM    PCP: No Ref-Primary, Physician    ED PROVIDER: Leonard Lamar M.D.      Chief Complaint   Patient presents with     Suicidal     Alcohol Intoxication         FINAL IMPRESSION:  1. Alcoholic intoxication with complication (H)    2. Suicidal ideation    3. Depression, unspecified depression type        MEDICAL DECISION MAKIN:59 PM I met with the patient, obtained history, performed an initial exam, and discussed options and plan for diagnostics and treatment here in the ED. PPE worn: surgical mask and nitrile gloves.  11:40 PM Care discussed with sarahi Salamanca .  5:00 AM Patient signed out to Dr. Hightower at shift change pending [facility] assessment.     This patient is a 52-year-old female with a history of chronic alcohol use, depression, headaches, intentional overdose and suicidal ideation who was brought to the ER by her daughter to get her help for her worsening depression, alcohol use and suicidal ideation.  The patient had gone through a outpatient treatment program a year ago and says she was sober for some time before relapsing several months ago.  She says she drinks a pint of whiskey a day and drank a pint today with her last drink around 9 PM.  She says that her   several weeks ago.  She has been getting worsening symptoms of depression including dysphoria, weight loss, lack of appetite, difficulty sleeping and more recently feeling suicidal.  She does not have a plan other than \"drinking herself to death \".  She is in the ER here to get help tonight.  I spoke to last who is with the DEC .  As the patient's alcohol level was 394, she said that she would have to wait until she is clinically sober and that we should reorder the DEC assessment then.  This will likely be in the morning.  The patient had " "complained of a headache but I was evaluating her initially.  Head CT was done to rule out a subdural hematoma.  The head CT did not show any acute findings.  There were no other unusual findings on her lab work, coags, electrolytes, blood counts and urine tox screen.  The DEC assessment can be reordered in the morning when she is sober and can be assessed whether she wants help with her alcoholism and depression.  And when she is sober it can be determined if in fact she is still suicidal.    Pertinent Labs & Imaging studies reviewed. (See chart for details)    MEDICATIONS GIVEN IN THE EMERGENCY:  Medications - No data to display    NEW PRESCRIPTIONS STARTED AT TODAY'S ER VISIT:  New Prescriptions    No medications on file          =================================================================    HPI    Patient information was obtained from: patient and her daughter    Use of : N/A        Holly Briseno is a 52 year old female with a past medical history of alcohol use with withdrawal, anxiety, depressive disorder, chronic migraines and tension headaches, prior intentional overdose, prior suicidal ideation, who presents alcohol intoxication and detox placement.    Patient here with daughter to get \"help\" with her alcohol problem and worsening depression after her  had passed away about 2 weeks ago. She was evaluated by psychologist for suicidal ideation after being seen in ED and prescribed Prozac. She was previously in a outpatient treatment program a year ago and was sober for a year afterwards but had recently relapsed a couple months ago. She is currently drinking about a pint of whiskey daily. Her last drink of alcohol was an hour prior to arrival. Currently, she endorses a headache, decreased appetite and sleep disturbance. She has been sleeping about 2 hours per night but her ideal would be 5 hours per night. She has lost about \"a couple pounds\" of weight. No recrational drug use " tonight. No chest pain, abdominal pain, leg pain. No other medical complaints at this time.       REVIEW OF SYSTEMS   Review of Systems   Constitutional: Positive for appetite change (decreased) and unexpected weight change. Negative for chills and fever.   HENT: Negative for congestion, rhinorrhea, sneezing and sore throat.    Respiratory: Negative for cough, chest tightness and shortness of breath.    Cardiovascular: Negative for chest pain.   Gastrointestinal: Negative for abdominal pain, diarrhea, nausea and vomiting.   Neurological: Positive for headaches. Negative for weakness and numbness.   Psychiatric/Behavioral: Positive for dysphoric mood, sleep disturbance and suicidal ideas. Negative for agitation and hallucinations. The patient is nervous/anxious.    All other systems reviewed and are negative.       PAST MEDICAL HISTORY:  History reviewed. No pertinent past medical history.    PAST SURGICAL HISTORY:  History reviewed. No pertinent surgical history.    CURRENT MEDICATIONS:    No current facility-administered medications for this encounter.  No current outpatient medications on file.    ALLERGIES:  Allergies   Allergen Reactions     Amoxicillin Rash     Per the Antimicrobial Stewardship Team 10/19:  No similarities in side chains for Amoxicillin and Ancef, very low to no risk of cross-reactivity.       FAMILY HISTORY:  History reviewed. No pertinent family history.    SOCIAL HISTORY:   Social History     Socioeconomic History     Marital status:        PHYSICAL EXAM:    Vitals: BP (!) 143/86   Pulse 83   Temp 97.3  F (36.3  C) (Temporal)   Resp 19   Wt 67.6 kg (149 lb)   SpO2 99%   BMI 24.79 kg/m     Constitutional: Quiet and intoxicated female who appears older than stated age.  Her daughter is present and assists with her history.  HEAD:Normocephalic, atraumatic,   Eyes: PERRLA, , conjunctiva clear, no discharge  ENT: There is a strong odor of alcohol on her breath.  Mucous membranes  moist, nose normal.   Neck- Supple, gross ROM intact.  No JVD.    Pulmonary:, no respiratory distress, no wheezing, speaks full sentences easily.  Cardiovascular:  No lower extremity edema, 2+ DP pulses.   GI: Soft, no tenderness to deep palpation in all quadrants, not distended, no masses.  No hepatosplenomegaly.  Musculoskeletal: Moving all 4 extremities intentionally and without pain. No obvious deformity.  Back: No CVA tenderness  Skin: Warm, dry, no rash.  Neurologic: Alert & oriented x 3, speech clear, moving all extremities spontaneously   Psychiatric: Intoxicated female with flat affect who admits to being depressed, cooperative.     LAB:  All pertinent labs reviewed and interpreted.  Labs Ordered and Resulted from Time of ED Arrival to Time of ED Departure   ETHYL ALCOHOL LEVEL - Abnormal       Result Value    Alcohol, Blood 394 (*)    COMPREHENSIVE METABOLIC PANEL - Abnormal    Sodium 147 (*)     Potassium 3.6      Chloride 110 (*)     Carbon Dioxide (CO2) 26      Anion Gap 11      Urea Nitrogen 6 (*)     Creatinine 0.73      Calcium 8.5      Glucose 94      Alkaline Phosphatase 61      AST 22      ALT 26      Protein Total 7.1      Albumin 3.8      Bilirubin Total 0.1      GFR Estimate >90     MAGNESIUM - Normal    Magnesium 2.2     INR - Normal    INR 0.94     HCG QUALITATIVE PREGNANCY - Normal    hCG Serum Qualitative Negative     CBC WITH PLATELETS AND DIFFERENTIAL    WBC Count 8.6      RBC Count 4.58      Hemoglobin 14.9      Hematocrit 43.8      MCV 96      MCH 32.5      MCHC 34.0      RDW 13.1      Platelet Count 415      % Neutrophils 64      % Lymphocytes 27      % Monocytes 6      % Eosinophils 2      % Basophils 1      % Immature Granulocytes 0      NRBCs per 100 WBC 0      Absolute Neutrophils 5.6      Absolute Lymphocytes 2.3      Absolute Monocytes 0.5      Absolute Eosinophils 0.1      Absolute Basophils 0.1      Absolute Immature Granulocytes 0.0      Absolute NRBCs 0.0     DRUGS OF ABUSE  1+ PANEL, URINE ([facility] ONLY)    Amphetamines Urine Screen Negative      Benzodiazepines Urine Screen Negative      Opiates Urine Screen Negative      PCP Urine Screen Negative      Cannabinoids Urine Screen Negative      Barbiturates Urine Screen Negative      Cocaine Urine Screen Negative      Methadone Urine Screen Negative      Oxycodone Urine Screen Negative      Creatinine Urine mg/dL 29         RADIOLOGY:  CT Head w/o Contrast   Final Result   IMPRESSION:   1.   No acute intracranial process.          I, Marisol Hu, am serving as a scribe to document services personally performed by Dr. Leonard Lamar based on my observation and the provider's statements to me. I, Leonard Lamar M.D. attest that Marisol Hu is acting in a scribe capacity, has observed my performance of the services and has documented them in accordance with my direction.      Leonard Lamar M.D.  Emergency Medicine  [facility] EMERGENCY DEPARTMENT  [facility] [facility], [place] [address]  [phone]  Dept: [phone]     Leonard Lamar MD  08/12/22 0432

## 2022-08-12 NOTE — ED NOTES
[facility] Note:    Attempted to meet with patient for remote mental health crisis/DEC assessment, however blood alcohol level came back as 394 mg/dL. Writer spoke with Dr. Lamar and we will plan to complete assessment when patient is clinically sober in the morning. A request should be sent to DEC when patient is appropriate and ready to participate when sober.     IDALMIS Baugh on 8/11/2022 at 11:47 PM

## 2022-08-12 NOTE — ED NOTES
DEC interview completed, patient will be discharged with outpatient MI/CD resources/appts. Dr. Barth is in agreement.    DEC attempted to call daughter, Liz Raymundo, but there was no answer, and prior Epic note indicates that she works until 1pm and could not pick patient up until after this time today.     DEC will upload discharge and safety plan into AVS shortly, please print for patient prior to discharge.

## 2022-08-12 NOTE — ED NOTES
Pt states she is staying at her late husbands house til the end of August, then will move in with her daughter into an apartment they share.

## 2022-08-12 NOTE — ED NOTES
"Patient up to bathroom, steady on feet. Iv has been discontinued, to be discharged, awaiting daughter for ride home. Patient requesting something to calm her. MD updated. Offered detox, patient declined, \"I will do it on my own\"  "

## 2022-08-12 NOTE — ED NOTES
DEC discharge and safety plan uploaded to AVS now.     DEC will be faxing over appointment sheet shortly as well, and there will be a follow up call in addition to aid patient in securing substance abuse consult in the near future.

## 2022-08-12 NOTE — ED NOTES
Patient's daughter had to leave and go home. Patient's daughter is Mehnaz Raymundo, phone number is [phone]. Pt's daughter gets off work at 1 PM today. She would not be able to  patient until then.

## 2022-08-12 NOTE — DISCHARGE INSTRUCTIONS
"Aftercare Plan    Last night and today you were supported in the emergency department for symptoms related to grief, loss, depression and alcohol use. Thank you so much for your transparency and we validate your grief 100%. You indicated that you are feeling safe and would be agreeable to outpatient supports, as you stated that you are not suicidal, nor do you have any specific intent to harm yourself in anyway. Today we scheduled three appointments for you, which include outpatient therapy, outpatient medication management, and outpatient chemical health supports. Your will receive an appointment sheet in the ED for the first two appointments, and please check your [email or web address] account for information regarding your chemical health appointment. Please follow up with the supports offered to you today to help with your healing and remember:    If I am feeling unsafe or I am in a crisis, I will:   Contact my established care providers   Call the National Suicide Prevention Lifeline: 988  Go to the nearest emergency room   Call 911     Warning signs that I or other people might notice when a crisis is developing for me: increased sadness or alcohol use    Changes I can make to support my mental health and wellness: Follow up with outpatient supports    People in my life that I can ask for help: Friends and family    Crisis Lines  Crisis Text Line  Text 357547  You will be connected with a trained live crisis counselor to provide support.    Por espanol, texto  DELBERT a 005661 o texto a 442-AYUDAME en WhatsApp    The [place] [employer] (LGBTQ Youth Crisis Line)  6.987.390.8876  text START to 250-254      Community Resources  Fast Tracker  Linking people to mental health and substance use disorder resources  fasttrackermn.org     [place] Mental [facility] Warm Line  Peer to peer support  Monday thru Saturday, 12 pm to 10 pm  229.358.5285 or 7.825.259.4605  Text \"Support\" to 33475    National Veteran on Mental " Illness (MARBIN)  163.246.8720 or 1.888.MARBIN.HELPS    Mental Health Apps  My3  https://myMaxTradeIn.compp.org/    VirtualHopeBox  https://[employer]/apps/virtual-hope-box/    Additional Information  Today you were seen by a licensed mental health professional through Triage and Transition services, [facility] (P)  for a crisis assessment in the Emergency Department at [facility].  It is recommended that you follow up with your established providers (psychiatrist, mental health therapist, and/or primary care doctor - as relevant) as soon as possible. Coordinators from [facility] will be calling you in the next 24-48 hours to ensure that you have the resources you need.  You can also contact [facility] coordinators directly at [phone]. You may have been scheduled for or offered an appointment with a mental health provider. [facility] maintains an extensive network of licensed behavioral health providers to connect patients with the services they need.  We do not charge providers a fee to participate in our referral network.  We match patients with providers based on a patient's specific needs, insurance coverage, and location.  Our first effort will be to refer you to a provider within your care system, and will utilize providers outside your care system as needed.

## 2022-08-12 NOTE — ED NOTES
"[facility] ED Mental Health Handoff Note:     Assuming care from: Dr Valdo Hightower    Brief HPI: 52 year old female signed out to me by the above provider. See initial ED Provider note for full details of the presentation.   In brief, patient      Patient here with daughter to get \"help\" with her alcohol problem and worsening depression after her  had passed away about 2 weeks ago. She was evaluated by psychologist for suicidal ideation after being seen in ED and prescribed Prozac. She was previously in a outpatient treatment program a year ago and was sober for a year afterwards but had recently relapsed a couple months ago. She is currently drinking about a pint of whiskey daily. Her last drink of alcohol was an hour prior to arrival. Currently, she endorses a headache, decreased appetite and sleep disturbance. She has been sleeping about 2 hours per night but her ideal would be 5 hours per night. She has lost about \"a couple pounds\" of weight. No recrational drug use tonight. No chest pain, abdominal pain, leg pain. No other medical complaints at this time.    Home meds reviewed and ordered/administered: No  Medically stable for inpatient mental health admission: Yes.  Evaluated by mental health: No. Patient is clinically sober and awaiting evaluation for disposition.  Safety concerns: At the time I received sign out, there were no safety concerns.    Hold Status:  Active Orders   N/A         Labs/Imaging:   Results for orders placed or performed during the hospital encounter of 08/11/22 (from the past 24 hour(s))   Ethyl Alcohol Level     Status: Abnormal    Collection Time: 08/11/22 10:55 PM   Result Value Ref Range    Alcohol, Blood 394 (H) None detected mg/dL   CBC with platelets differential     Status: None    Collection Time: 08/11/22 10:55 PM    Narrative    The following orders were created for panel order CBC with platelets differential.  Procedure                               " Abnormality         Status                     ---------                               -----------         ------                     CBC with platelets and d...[[id number]]                      Final result                 Please view results for these tests on the individual orders.   Comprehensive metabolic panel     Status: Abnormal    Collection Time: 08/11/22 10:55 PM   Result Value Ref Range    Sodium 147 (H) 136 - 145 mmol/L    Potassium 3.6 3.5 - 5.0 mmol/L    Chloride 110 (H) 98 - 107 mmol/L    Carbon Dioxide (CO2) 26 22 - 31 mmol/L    Anion Gap 11 5 - 18 mmol/L    Urea Nitrogen 6 (L) 8 - 22 mg/dL    Creatinine 0.73 0.60 - 1.10 mg/dL    Calcium 8.5 8.5 - 10.5 mg/dL    Glucose 94 70 - 125 mg/dL    Alkaline Phosphatase 61 45 - 120 U/L    AST 22 0 - 40 U/L    ALT 26 0 - 45 U/L    Protein Total 7.1 6.0 - 8.0 g/dL    Albumin 3.8 3.5 - 5.0 g/dL    Bilirubin Total 0.1 0.0 - 1.0 mg/dL    GFR Estimate >90 >60 mL/min/1.73m2   Magnesium     Status: Normal    Collection Time: 08/11/22 10:55 PM   Result Value Ref Range    Magnesium 2.2 1.8 - 2.6 mg/dL   INR     Status: Normal    Collection Time: 08/11/22 10:55 PM   Result Value Ref Range    INR 0.94 0.85 - 1.15   HCG qualitative Blood     Status: Normal    Collection Time: 08/11/22 10:55 PM   Result Value Ref Range    hCG Serum Qualitative Negative Negative   CBC with platelets and differential     Status: None    Collection Time: 08/11/22 10:55 PM   Result Value Ref Range    WBC Count 8.6 4.0 - 11.0 10e3/uL    RBC Count 4.58 3.80 - 5.20 10e6/uL    Hemoglobin 14.9 11.7 - 15.7 g/dL    Hematocrit 43.8 35.0 - 47.0 %    MCV 96 78 - 100 fL    MCH 32.5 26.5 - 33.0 pg    MCHC 34.0 31.5 - 36.5 g/dL    RDW 13.1 10.0 - 15.0 %    Platelet Count 415 150 - 450 10e3/uL    % Neutrophils 64 %    % Lymphocytes 27 %    % Monocytes 6 %    % Eosinophils 2 %    % Basophils 1 %    % Immature Granulocytes 0 %    NRBCs per 100 WBC 0 <1 /100    Absolute Neutrophils 5.6 1.6 - 8.3 10e3/uL     Absolute Lymphocytes 2.3 0.8 - 5.3 10e3/uL    Absolute Monocytes 0.5 0.0 - 1.3 10e3/uL    Absolute Eosinophils 0.1 0.0 - 0.7 10e3/uL    Absolute Basophils 0.1 0.0 - 0.2 10e3/uL    Absolute Immature Granulocytes 0.0 <=0.4 10e3/uL    Absolute NRBCs 0.0 10e3/uL   CT Head w/o Contrast     Status: None    Collection Time: 08/11/22 11:14 PM    Narrative    EXAM: CT HEAD W/O CONTRAST  LOCATION: [facility]  DATE/TIME: 8/11/2022 11:11 PM    INDICATION: Alleged intoxication. Headache.  COMPARISON: 7/30/2022.  TECHNIQUE: Routine CT Head without IV contrast. Multiplanar reformats. Dose reduction techniques were used.    FINDINGS:  INTRACRANIAL CONTENTS: No intracranial hemorrhage, extraaxial collection, or mass effect.  No CT evidence of acute infarct. Normal parenchymal attenuation. Minimal generalized volume loss. No hydrocephalus.     VISUALIZED ORBITS/SINUSES/MASTOIDS: No intraorbital abnormality. No paranasal sinus mucosal disease. No middle ear or mastoid effusion.    BONES/SOFT TISSUES: No acute abnormality.      Impression    IMPRESSION:  1.   No acute intracranial process.   Urine Drugs of Abuse Screen Panel 1+ - Drug Screen plus Methadone     Status: None    Collection Time: 08/11/22 11:35 PM    Narrative    The following orders were created for panel order Urine Drugs of Abuse Screen Panel 1+ - Drug Screen plus Methadone.  Procedure                               Abnormality         Status                     ---------                               -----------         ------                     Drugs of Abuse 1+ Panel,...[[id number]]                      Final result                 Please view results for these tests on the individual orders.   Drugs of Abuse 1+ Panel, Urine ([facility] Only)     Status: None    Collection Time: 08/11/22 11:35 PM   Result Value Ref Range    Amphetamines Urine Screen Negative Screen Negative    Benzodiazepines Urine Screen Negative Screen Negative    Opiates Urine  Screen Negative Screen Negative    PCP Urine Screen Negative Screen Negative    Cannabinoids Urine Screen Negative Screen Negative    Barbiturates Urine Screen Negative Screen Negative    Cocaine Urine Screen Negative Screen Negative    Methadone Urine Screen Negative Screen Negative    Oxycodone Urine Screen Negative Screen Negative    Creatinine Urine mg/dL 29 mg/dL    Narrative    Drug                           Screening Threshold    Amphetamines                    1000 ng/mL  Benzodiazepine                   200 ng/mL  Opiates                          300 ng/mL  Phencyclidine                     25 ng/mL  THC Metabolite                    50 ng/mL  Barbiturates                     200 ng/mL  Cocaine Metabolite               150 ng/mL  Methadone                        300 ng/mL  Oxycodone                        100 ng/mL    Screening results are to be used only for medical purposes.  Unconfirmed screening results are not to be used for non-  medical purposes.   Ethyl Alcohol Level     Status: Abnormal    Collection Time: 22  6:54 AM   Result Value Ref Range    Alcohol, Blood 225 (H) None detected mg/dL         ED Meds:  Medications   acetaminophen (TYLENOL) tablet 650 mg (has no administration in time range)   acetaminophen (TYLENOL) tablet 650 mg (has no administration in time range)   docusate sodium (COLACE) capsule 100 mg (100 mg Oral Given 22 0845)   LORazepam (ATIVAN) tablet 1 mg (1 mg Oral Given 22 0845)     CT Head w/o Contrast   Final Result   IMPRESSION:   1.   No acute intracranial process.          ED Course:       7:33 AM I met with patient for initial encounter.  I met with patient.  She states she is not going to kill herself by shooting herself or hanging herself but if she is going drink herself to death.  This is how her   per her report.  She denies abdominal pain but states she had a last bowel movement yesterday and wanted to have something for constipation.  She  states she is hungry.    8:09 AM nurse patient would like to have something for shaking anxiety CYC 11 we will do 1 mg of oral Ativan.    8:20 AM I spoke to DEC  regarding plan for evaluation.     8:40 AM I spoke to DEC  Edilberto who evaluated the patient. Patient is not actively homicidal or suicidal. Will be scheduled for outpatient follow up.     11:00 AM Patient not actively suicidal or homicidal. Patient is awaiting a ride from her daughter which should arrive around 1:00 PM.    Clinical impression and decision making 52-year-old female signed out to me by Dr. Hightower.  Patient presented intoxicated with suicidal ideation.  During my evaluation patient is awake alert clinically sober she is oriented x3.  Very forthcoming makes eye contact.  She states she has no desire to kill herself with Albon or hang herself but she wants to drink herself to death.  Her  recently  secondary to alcohol abuse.    Patient evaluated by DEC.  Patient was not actively suicidal she contract for safety is agreed with resources and follow-up.    I immediately went to evaluate patient.  She makes eye contact.  Contracts for safety.  Not actively suicidal not homicidal.  Awaiting for her daughter to get out of for before she is discharged.    There were no significant events during my shift.        Impression:    ICD-10-CM    1. Alcoholic intoxication with complication (H)  F10.929    2. Suicidal ideation  R45.851    3. Depression, unspecified depression type  F32.A        Plan:    1. Discharged.    Dana Barth MD  [facility] EMERGENCY DEPARTMENT  [address] [place] [id number] [phone]                   Dana Barth MD  22 [phone]

## 2022-08-12 NOTE — CONSULTS
Diagnostic Evaluation Consultation  Crisis Assessment    Patient was assessed: I-Pad  [facility]  Was a release of information signed: No. Reason: declined      Referral Data and Chief Complaint  Patient is a 52 year old, who uses she/her pronouns, and presents to the ED with family/friends. Patient is referred to the ED by family/friends. Patient is presenting to the ED for the following concerns: worsening depression related to grief and loss, complicated by ETOH abuse.      Informed Consent and Assessment Methods     Patient is her own guardian. Writer met with patient and explained the crisis assessment process, including applicable information disclosures and limits to confidentiality, assessed understanding of the process, and obtained consent to proceed with the assessment. Patient was observed to be able to participate in the assessment as evidenced by her ability to speak. Assessment methods included conducting a formal interview with patient, review of medical records, collaboration with medical staff, and obtaining relevant collateral information from family and community providers when available.    Over the course of this crisis assessment provided reassurance, offered validation, engaged patient in problem solving and disposition planning and worked with patient on safety and aftercare planning. Patient's response to interventions was positive.     Summary of Patient Situation  Patient was brought to the ED last night by her daughter due to increased depression, passive suicidal statements and ETOH abuse all most recently escalated due to the sudden death of patient's  of two months due to ETOH abuse. Patient states that she met her  in treatment programming and he claimed to be sober. They  on 22 and he suddenly  on 22 due to continued ETOH abuse and other health complications as a result. Patient has a hx of depression and ETOH dependence, but  since this loss her sx have been expectedly intensified and include lack of appetite, insomnia, lethargy, tearfulness, increased ETOH consumption and passive ideations of death. Patient can contract for safety now that she is sober and has spent the evening in the ED.     Brief Psychosocial History  Patient lived with her new  prior to his death, and shared that she currently is well supported by her two adult children who live in the area and are highly active in patient's life and invested in her well being. No other information was available at this time.     Significant Clinical History  Patient has a hx of depression and ETOH abuse, both dating back at least a decade, with her most recent inpatient admission being in 2020 at [facility] due to CD sx. She has previously been linked to outpatient tx and med management, but admits lack of follow through on her behalf due to ETOH abuse, and denies any prior commitments.      Collateral Information  Chart review and consult with ED completed, attempts were made to call patient's daughter, Liz as indicated in Epic note, but she is working until 1pm and unable to take calls at this time.      Risk Assessment  ESS-6  1.a. Over the past 2 weeks, have you had thoughts of killing yourself? Yes  1.b. Have you ever attempted to kill yourself and, if yes, when did this last happen? No   2. Recent or current suicide plan? No   3. Recent or current intent to act on ideation? No  4. Lifetime psychiatric hospitalization? Yes  5. Pattern of excessive substance use? Yes  6. Current irritability, agitation, or aggression? No  Scoring note: BOTH 1a and 1b must be yes for it to score 1 point, if both are not yes it is zero. All others are 1 point per number. If all questions 1a/1b - 6 are no, risk is negligible. If one of 1a/1b is yes, then risk is mild. If either question 2 or 3, but not both, is yes, then risk is automatically moderate regardless of total score. If both  2 and 3 are yes, risk is automatically high regardless of total score.      Score: 2, moderate risk      Does the patient have access to lethal means? No     Does the patient engage in non-suicidal self-injurious behavior (NSSI/SIB)? no     Does the patient have thoughts of harming others? No     Is the patient engaging in sexually inappropriate behavior?  no        Current Substance Abuse     Is there recent substance abuse? Substance type(s): ETOH Frequency: Daily Quantity: pint Method: oral Duration: 3-4 weeks Last use: yesterday     Was a urine drug screen or blood alcohol level obtained: Yes completed upon arrival, patient intoxicated, kept in ED until sober this morning       Mental Status Exam     Affect: Appropriate   Appearance: Appropriate    Attention Span/Concentration: Attentive  Eye Contact: Engaged   Fund of Knowledge: Appropriate    Language /Speech Content: Fluent   Language /Speech Volume: Normal    Language /Speech Rate/Productions: Normal    Recent Memory: Intact   Remote Memory: Intact   Mood: Sad    Orientation to Person: Yes    Orientation to Place: Yes   Orientation to Time of Day: Yes    Orientation to Date: Yes    Situation (Do they understand why they are here?): Yes    Psychomotor Behavior: Underactive    Thought Content: Clear   Thought Form: Intact      History of commitment: No     Medication  Patient is prescribed unknown psychiatric medications at this time, but admits to not taking them due to her ETOH abuse     Current Care Team    Primary Care Provider: No  Psychiatrist: No  Therapist: No  : No     CTSS or ARMHS: No  ACT Team: No  Other: No      Diagnosis    296.32 (F33.1) Major Depressive Disorder, Recurrent Episode, Moderate _ and With anxious distress   Substance-Related & Addictive Disorders Alcohol Use Disorder   303.90 (F10.20) Moderate In a controlled environment - by history     Clinical Summary and Substantiation of Recommendations    Patient is presently  sober and lucid after being housed in the ED overnight due to intoxication combined with depressive features and passive suicidal statements. Patient was pleasant throughout and grieving the sudden death of her  last week. She indicates this morning that she is not actively suicidal and does not have any plan or intent to act towards suicide, but rather feels hopeless and sad as she mourns her 's death. She admits that her ETOH hx complicates her clinical presentation, but is still able to contract for safety at this time. Patient is clinically appropriate for outpatient follow and DEC will work to arrange such support and document in AVS shortly.   Disposition    Recommended disposition: Individual Therapy, Medication Management and Substance Abuse Disorder Treatment       Reviewed case and recommendations with attending provider. Attending Name: Dr. Barth       Attending concurs with disposition: Yes       Patient concurs with disposition: Yes       Guardian concurs with disposition: NA      Final disposition: Individual therapy , Medication management and Substance abuse disorder treatment .     Outpatient Details (if applicable):   Aftercare plan and appointments placed in the AVS and provided to patient: Yes. Given to patient by ED staff    Was lethal means counseling provided as a part of aftercare planning? No;       Assessment Details    Patient interview started at: 8:00am and completed at: 9:00am.     Total duration spent on the patient case in minutes: 2.0 hrs      CPT code(s) utilized: 03474 - Psychotherapy for Crisis - 60 (30-74*) min       Rolf Simmons, LICSW, MSW, LICSW  DEC - Triage & Transition Services      Aftercare Plan    Last night and today you were supported in the emergency department for symptoms related to grief, loss, depression and alcohol use. Thank you so much for your transparency and we validate your grief 100%. You indicated that you are feeling safe and would  "be agreeable to outpatient supports, as you stated that you are not suicidal, nor do you have any specific intent to harm yourself in anyway. Please follow up with the supports offered to you today to help with your healing and remember:    If I am feeling unsafe or I am in a crisis, I will:   Contact my established care providers   Call the National Suicide Prevention Lifeline: 988  Go to the nearest emergency room   Call 911     Warning signs that I or other people might notice when a crisis is developing for me: increased sadness or alcohol use    Changes I can make to support my mental health and wellness: Follow up with outpatient supports    People in my life that I can ask for help: Friends and family    Crisis Lines  Crisis Text Line  Text 803204  You will be connected with a trained live crisis counselor to provide support.    Por lori, texto  DELBERT a 295978 o texto a 442-AYUDAME en WhatsApp    [employer] [place] [employer] (LGBTQ Youth Crisis Line)  9.976.063.6165  text START to 057-542      Community Resources  Fast Tracker  Linking people to mental health and substance use disorder resources  HourlyNerdtraXenapto.org     [place] Mental Health Warm Line  Peer to peer support  Monday thru Saturday, 12 pm to 10 pm  004.744.1555 or 7.525.766.7411  Text \"Support\" to 95546    National [place] on Mental Illness (MARBIN)  273.631.0703 or 1.888.MARBIN.HELPS    Mental Health Apps  My3  https://myClickBuspp.org/    VirtualHopeBox  https://Netcontinuum.org/apps/virtual-hope-box/    Additional Information  Today you were seen by a licensed mental health professional through Triage and Transition services, [facility] ([facility])  for a crisis assessment in the Emergency Department at [facility].  It is recommended that you follow up with your established providers (psychiatrist, mental health therapist, and/or primary care doctor - as relevant) as soon as possible. Coordinators from P will be calling you " in the next 24-48 hours to ensure that you have the resources you need.  You can also contact [facility] coordinators directly at [phone]. You may have been scheduled for or offered an appointment with a mental health provider. [facility] maintains an extensive network of licensed behavioral health providers to connect patients with the services they need.  We do not charge providers a fee to participate in our referral network.  We match patients with providers based on a patient's specific needs, insurance coverage, and location.  Our first effort will be to refer you to a provider within your care system, and will utilize providers outside your care system as needed.

## 2022-08-12 NOTE — ED TRIAGE NOTES
"Patient arrives with daughter.  Reported that patient lost her  about 3 weeks ago and \"hasn't been doing well.\"  Patient is tearful in triage but does not share much- most information is collected from daughter.  The patient called the daughter this evening intoxicated and expressed some suicidal ideation with plan \"that she'd drink herself to death.\"  Patient appears very unsteady on her feet upon arrival.  Family states recent visit for similar issues.       "

## 2022-08-17 ENCOUNTER — HOSPITAL ENCOUNTER (OUTPATIENT)
Dept: BEHAVIORAL HEALTH | Facility: CLINIC | Age: 52
Discharge: HOME | End: 2022-08-17
Attending: FAMILY MEDICINE | Admitting: FAMILY MEDICINE
Payer: COMMERCIAL

## 2022-08-17 VITALS — WEIGHT: 135 LBS | HEIGHT: 65 IN | BODY MASS INDEX: 22.49 KG/M2

## 2022-08-17 PROBLEM — F10.939: Status: ACTIVE | Noted: 2021-02-04

## 2022-08-17 PROBLEM — G43.E09 CHRONIC MIGRAINE WITH AURA: Status: ACTIVE | Noted: 2022-08-17

## 2022-08-17 PROCEDURE — H0001 ALCOHOL AND/OR DRUG ASSESS: HCPCS | Mod: GT,95

## 2022-08-17 RX ORDER — FLUTICASONE PROPIONATE 50 MCG
1 SPRAY, SUSPENSION (ML) NASAL DAILY PRN
Status: ON HOLD | COMMUNITY
Start: 2022-03-15 | End: 2022-12-12

## 2022-08-17 RX ORDER — ACETAMINOPHEN 500 MG
500-1000 TABLET ORAL EVERY 6 HOURS PRN
Status: ON HOLD | COMMUNITY
End: 2022-12-12

## 2022-08-17 RX ORDER — HYDROXYZINE PAMOATE 25 MG/1
25 CAPSULE ORAL 3 TIMES DAILY PRN
Status: ON HOLD | COMMUNITY
Start: 2022-08-08 | End: 2022-12-12

## 2022-08-17 RX ORDER — MULTIVITAMIN WITH IRON
1 TABLET ORAL DAILY
Status: ON HOLD | COMMUNITY
End: 2023-08-15

## 2022-08-17 RX ORDER — HYDROXYZINE HYDROCHLORIDE 10 MG/1
TABLET, FILM COATED ORAL
Status: ON HOLD | COMMUNITY
Start: 2022-08-08 | End: 2022-12-12

## 2022-08-17 ASSESSMENT — COLUMBIA-SUICIDE SEVERITY RATING SCALE - C-SSRS
6. HAVE YOU EVER DONE ANYTHING, STARTED TO DO ANYTHING, OR PREPARED TO DO ANYTHING TO END YOUR LIFE?: NO
5. HAVE YOU STARTED TO WORK OUT OR WORKED OUT THE DETAILS OF HOW TO KILL YOURSELF? DO YOU INTEND TO CARRY OUT THIS PLAN?: NO
2. HAVE YOU ACTUALLY HAD ANY THOUGHTS OF KILLING YOURSELF IN THE PAST MONTH?: NO
4. HAVE YOU HAD THESE THOUGHTS AND HAD SOME INTENTION OF ACTING ON THEM?: NO
3. HAVE YOU BEEN THINKING ABOUT HOW YOU MIGHT KILL YOURSELF?: NO
1. IN THE PAST MONTH, HAVE YOU WISHED YOU WERE DEAD OR WISHED YOU COULD GO TO SLEEP AND NOT WAKE UP?: YES

## 2022-08-17 ASSESSMENT — PAIN SCALES - GENERAL: PAINLEVEL: MODERATE PAIN (4)

## 2022-08-17 NOTE — PROGRESS NOTES
"[facility]  Provider Name:  Ghazal Stafford     Credentials:  HUGO GOMEZ    PATIENT'S NAME: Holly Briseno  PREFERRED NAME: \"Rainer"  PRONOUNS: she/her/hers     MRN: [id number]  : 1970  ADDRESS: [address] [place] [address]  ACCT. NUMBER:  [id number]  DATE OF SERVICE: 22  START TIME: 1:00 pm  END TIME: 2:17 pm  INSURANCE: [employer] [place]  PMI:  [id number]  PREFERRED PHONE: [phone]  May we leave a program related message: Yes  EMERGENCY CONTACT: Mehnaz Raymundo, daughter, [phone]    SERVICE MODALITY:  Video Visit:      Provider verified identity through the following two step process.  Patient provided:  Patient  and Patient's last 4 digits of N    Telemedicine Visit: The patient's condition can be safely assessed and treated via synchronous audio and visual telemedicine encounter.      Reason for Telemedicine Visit: Patient has requested telehealth visit    Originating Site (Patient Location): Patient's home    Distant Site (Provider Location): Provider Remote Setting- Home Office    Consent:  The patient/guardian has verbally consented to: the potential risks and benefits of telemedicine (video visit) versus in person care; bill my insurance or make self-payment for services provided; and responsibility for payment of non-covered services.     Patient would like the video invitation sent by:  My Chart    Mode of Communication:  Video Conference via [employer]BR[employer]    As the provider I attest to compliance with applicable laws and regulations related to telemedicine.    UNIVERSAL ADULT SUBSTANCE USE DISORDER DIAGNOSTIC ASSESSMENT    Identifying Information:  Patient is a 52 year old,  individual of [place] and [place] descent.  The pronoun use throughout this assessment reflects the patient's chosen pronoun.  Patient was referred for an assessment by hospital.  Patient attended the session alone.    Chief Complaint:   The reason for " "seeking services at this time is: \"Dealing with trauma from watching  of one month die twice.\"  The problem(s) began 7/5/2022.  Patient said she had struggled with alcohol use most of her adult life.  Patient has attempted to resolve these concerns in the past through IP, IOP, and sober support groups.  Patient does not appear to be in severe withdrawal, an imminent safety risk to self or others, or requiring immediate medical attention and may proceed with the assessment interview.    Social/Family History:  Patient reported they grew up in [facility].  They were raised by biological parents.  Parents  when patient was 18 years old.  Patient has 1 brother who was adopted.  Parents one or both remarried.  Patient reported that their childhood was \"good.  We were very well cared for, there was a lot of love in our home.  My parents.... I wouldn't say we were spoiled but we didn't lack anything.  My brother played hockey so there was a lot of dedication to that.  My parents were good about having us in activities.  My dad was aloof, I didn't know him until I was 20.  He didn't have a lot of deep involved conversations with us ut when I turned 20, something shifted.  Until 24, we were very close before he passed away.  Patient described their current relationships with family of origin as \"I've always been close to my mom.  Good, we talk frequently and get together.  When we're together it's always fun, there is never tension.  It's always upbeat and positive.\"      The patient describes their cultural background as  being raised [employer]ology.  Cultural influences and impact on patient's life structure, values, norms, and healthcare: Apostolic Religion.  Contextual influences on patient's health include: Individual Factors Substance Use.  Patient identified their preferred language to be English. Patient reported they does not need the assistance of an  or other support involved in " "therapy.  Patient reports they are involved in community of teresa activities.  They reports spirituality impacts recovery in the following ways:  \"100%, I start every morning in the Bible.  Now that I've pulled myself out of the  of alcohol again, I brush my teeth, my coffee and my Bible.\"     Patient reported had no significant delays in developmental tasks.   Patient's highest education level was associate degree / vocational certificate. Patient identified the following learning problems: none reported.  Patient reports they are  able to understand written materials.    Patient reported the following relationship history 2 marriages, first marriage was 15 years long and second was 30 days.  Patient's current relationship status is  for 5 weeks.   Patient identified their sexual orientation as heterosexual.  Patient reported having 2 children.     Patient's current living/housing situation involves staying in own home/apartment.  The immediate members of family and household include Mehnaz Raymundo, 19,Daughter and they report that housing is stable. Patient identified mother; other as part of their support system.  Patient identified the quality of these relationships as good.      Patient reports engaging in the following recreational/leisure activities: read, walks, hiking, anything outside, cooking/baking.  Patient is currently unemployed, but used to work in a [employer] shop.  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  Patient reports their income is obtained through savings.  Patient has filed for disability.  Patient does not identify finances as a current stressor.      Patient reports the following substance related arrests or legal issues: DWI in February 2021.  They are not under any current court jurisdiction. .    Patient's Strengths and Limitations:  Patient identified the following strengths or resources that will help them succeed in treatment: teresa / spirituality and " self love. Things that may interfere with the patient's success in treatment include: financial hardship and grief issues.     Assessments:  The following assessments were completed by patient for this visit:  PHQ2:   PHQ-2 ( 1999 [employer]) 8/16/2022   Q1: Little interest or pleasure in doing things 0   Q2: Feeling down, depressed or hopeless 1   PHQ-2 Score 1   Q1: Little interest or pleasure in doing things Not at all   Q2: Feeling down, depressed or hopeless Several days   PHQ-2 Score 1     GAD2:   COLLIN-2 8/16/2022   Not being able to stop or control worrying 0     CAGE-AID:   CAGE-AID Total Score 8/17/2022   Total Score 2   Total Score MyChart 2 (A total score of 2 or greater is considered clinically significant)     PROMIS 10-Global Health (all questions and answers displayed):   PROMIS 10 8/17/2022   In general, would you say your health is: Very good   In general, would you say your quality of life is: Very good   In general, how would you rate your physical health? Very good   In general, how would you rate your mental health, including your mood and your ability to think? Good   In general, how would you rate your satisfaction with your social activities and relationships? Good   In general, please rate how well you carry out your usual social activities and roles Good   To what extent are you able to carry out your everyday physical activities such as walking, climbing stairs, carrying groceries, or moving a chair? Completely   How often have you been bothered by emotional problems such as feeling anxious, depressed or irritable? Sometimes   How would you rate your fatigue on average? Mild   How would you rate your pain on average?   0 = No Pain  to  10 = Worst Imaginable Pain 6   In general, would you say your health is: 4   In general, would you say your quality of life is: 4   In general, how would you rate your physical health? 4   In general, how would you rate your mental health, including your mood and  your ability to think? 3   In general, how would you rate your satisfaction with your social activities and relationships? 3   In general, please rate how well you carry out your usual social activities and roles. (This includes activities at home, at work and in your community, and responsibilities as a parent, child, spouse, employee, friend, etc.) 3   To what extent are you able to carry out your everyday physical activities such as walking, climbing stairs, carrying groceries, or moving a chair? 5   In the past 7 days, how often have you been bothered by emotional problems such as feeling anxious, depressed, or irritable? 3   In the past 7 days, how would you rate your fatigue on average? 2   In the past 7 days, how would you rate your pain on average, where 0 means no pain, and 10 means worst imaginable pain? 6   Global Mental Health Score 13   Global Physical Health Score 16   PROMIS TOTAL - SUBSCORES 29     Assumption Suicide Severity Rating Scale (Lifetime/Recent)  Assumption Suicide Severity Rating (Lifetime/Recent) 7/30/2022 8/11/2022 8/12/2022 8/12/2022 8/17/2022   Q1 Wished to be Dead (Past Month) yes yes yes yes yes   Q2 Suicidal Thoughts (Past Month) yes yes yes yes no   Q3 Suicidal Thought Method yes no yes yes no   Q4 Suicidal Intent without Specific Plan no no yes - no   Q5 Suicide Intent with Specific Plan no no yes yes no   Q6 Suicide Behavior (Lifetime) no no yes yes no   Within the Past 3 Months? - no - yes -   Level of Risk per Screen moderate risk low risk high risk high risk low risk     GAIN-sliding scale:  When was the last time that you had significant problems... 8/17/2022   with feeling very trapped, lonely, sad, blue, depressed or hopeless about the future? Never   with sleep trouble, such as bad dreams, sleeping restlessly, or falling asleep during the day? Never   with feeling very anxious, nervous, tense, scared, panicked or like something bad was going to happen? Never   with becoming  very distressed & upset when something reminded you of the past? Past month   with thinking about ending your life or committing suicide? Never      When was the last time that you did the following things 2 or more times? 8/17/2022   Lied or conned to get things you wanted or to avoid having to do something? Past month   Had a hard time paying attention at school, work or home? Never   Had a hard time listening to instructions at school, work or home? Never   Were a bully or threatened other people? Never   Started physical fights with other people? Never       Personal and Family Medical History:  Patient does report a family history of mental health concerns.  Patient reports family history includes Substance Abuse in her father and mother.      Patient reported the following previous mental health diagnoses: an anxiety disorder; depression.  Patient reports their primary mental health symptoms include:  Tearfulness, feelings of sadness and these do not impact her ability to function.   Patient received mental health services in the past: therapy; partial hospitalization program.  Psychiatric Hospitalizations: [facility] when February 3-10, 2021.  Patient denies a history of civil commitment.  Current mental health services/providers include:  Dr. Del Angel at [facility].    Patient has had a physical exam to rule out medical causes for current symptoms.  Date of last physical exam was within the past year. Client was encouraged to follow up with PCP if symptoms were to develop. The patient has a non-[facility] Primary Care Provider. Their PCP is [facility].  Patient reports no current medical and/or dental concerns that aren't being addressed.  Patient reports pain concerns including headache, neck and back.  Patient does not want help addressing pain concerns. Patient denies pregnancy.  There are not significant appetite / nutritional concerns / weight changes.  Patient does not report a history of an eating  disorder.  Patient does not report a history of head injury / trauma / cognitive impairment.      Patient reports current meds as:   Outpatient Medications Marked as Taking for the 8/17/22 encounter (Hospital Encounter) with Ghazal Stafford LADC   Medication Sig     acetaminophen (TYLENOL) 500 MG tablet Take 500 mg by mouth     FLUoxetine (PROZAC) 20 MG capsule Take 20 mg by mouth     fluticasone (FLONASE) 50 MCG/ACT nasal spray Spray 1 spray in nostril     hydrOXYzine (ATARAX) 10 MG tablet 1-2 tabs up to three time daily as needed for anxiety or insomnia.  May cause drowsiness.     hydrOXYzine (VISTARIL) 25 MG capsule Take 25 mg by mouth     vitamin C with B complex (B COMPLEX-C) tablet Take 1 tablet by mouth daily     vitamin D3 (CHOLECALCIFEROL) 125 MCG (5000 UT) tablet Take 5,000 Units by mouth       Medication Adherence:  Patient reports taking. taking prescribed medications as prescribed.      Patient Allergies:    Allergies   Allergen Reactions     Amoxicillin Rash     Per the Antimicrobial Stewardship Team 10/19:  No similarities in side chains for Amoxicillin and Ancef, very low to no risk of cross-reactivity.     Sulfa Drugs Hives, Itching and Rash       Medical History:    Past Medical History:   Diagnosis Date     Cancer (H) September 2021    Melanoma     Depressive disorder February 3 2021             Substance Use:  Patient reported the following biological family members or relatives with chemical health issues:  mother and father..  Patient has received substance use disorder and/or gambling treatment in the past.  Patient reports the following dates and locations of treatment services:  [place], [facility], and [facility].  Patient has not ever been to detox.  Patient is not currently receiving any chemical dependency treatment. Patient reports they have attended the following support groups: AA in the past.      Substance History of use Age of first use Pattern and duration of use (include amounts and  "frequency) Date of last use     Withdrawal potential Route of administration   Alcohol used in the past   16 Current use: Patient was drinking whiskey, consuming 1 pint of Fireball.  Patient said she would drink 203 days consecutively and then be sober for 5-6.    History of use:  Patient went to treatment at [facility] 2021.  Patient said before then she would drink wine, consuming 1-2 bottles per time. Denies daily drinking but said pattern same, amounts different and would drink to black out.    Use of this pattern had been occurring for 1.5 years.  Patient said she would frequently get suicidal thoughts while intoxicated. 2022 Yes, shaky, sweaty, nausea, headache oral   Cannabis never used        Amphetamines never used        Cocaine/crack  never used        Hallucinogens never used         Inhalants never used         Heroin never used         Other Opiates never used        Benzodiazepine never used        Barbiturates never used        Over the counter meds never used        Caffeine currently use 20 Current use: 2 cups of coffee in the morning. 24 No oral   Nicotine  never used        other substances not listed above:  Identify:  never used            Patient reported the following problems as a result of their substance use:no problems, not applicable per patient report.  History reports DUI, family issues, relationship issues.  Patient is concerned about substance use. Patient reports no one is concerned about their substance use.  Patient reports their recovery goals are \"to be where I was at before my  .  To find estrella in everything and to enjoy life.\"     Patient reports experiencing the following withdrawal symptoms within the past 12 months: sweating, shaky/jittery/tremors, headache, sad/depressed feeling and anxiety/worry and the following within the past 30 days: sweating, shaky/jittery/tremors, headache, sad/depressed feeling and anxiety/worry.   Patients reports no urges to use " Alcohol.  Patient reports she has used more Alcohol than intended and over a longer period of time than intended. Patient reports she has had unsuccessful attempts to cut down or control use of Alcohol.  Patient reports longest period of abstinence was 8 years and return to use was due to her first  leaving her. Patient reports she has needed to use more Alcohol to achieve the same effect.  Patient does  report diminished effect with use of same amount of Alcohol.     Patient does not report a great deal of time is spent in activities necessary to obtain, use, or recover from Alcohol effects.  Patient does not report important social, occupational, or recreational activities are given up or reduced because of Alcohol use.  Alcohol use is continued despite knowledge of having a persistent or recurrent physical or psychological problem that is likely to have caused or exacerbated by use.  Patient reports the following problem behaviors while under the influence of substances: patient stated she frequently struggles with suicidal ideation while under the influence.     Patient reports substance use has not ever impacted their ability to function in a school setting. Patient reports substance use has not ever impacted their ability to function in a work setting.  Patients demographics and history impact their recovery in the following ways:  N/A.  Patient reports engaging in the following recreation/leisure activities while using:  Patient said she drinks at home and it escalates with her mental health.  Patient reports the following people are supportive of recovery: Children, parents, aunt, neighbors, and Christianity family.     Patient does not have a history of gambling concerns and/or treatment.  Patient does not have other addictive behaviors she is concerned about.      Dimension Scale Ratings:    Dimension 1 - Acute Intoxication/Withdrawal: 0 Client displays full functioning with good ability to tolerate and  cope with withdrawal discomfort. No signs or symptoms of intoxication or withdrawal or resolving signs or symptoms.  Dimension 2 - Biomedical: 0 Client displays full functioning with good ability to cope with physical discomfort.  Dimension 3 - Emotional/Behavioral/Cognitive Conditions: 2 Client has difficulty with impulse control and lacks coping skills. Client has thoughts of suicide or harm to others without means; however, the thoughts may interfere with participation in some treatment activities. Client has difficulty functioning in significant life areas. Client has moderate symptoms of emotional, behavioral, or cognitive problems. Client is able to participate in most treatment activities.  Dimension 4 - Readiness to Change:  0 Client is cooperative, motivated, ready to change, admits problems, committed to change, and engaged in treatment as a responsible participant.  Dimension 5 - Relapse/Continued Use/ Continued Problem Potential: 1 Client recognizes relapse issues and prevention strategies, but displays some vulnerability for further substance use or mental health problems.  Dimension 6 - Recovery Environment:  1 Client has passive social  or family and significant other are not interested in the client's recovery. The client is engaged in structured meaningful activity.    Significant Losses / Trauma / Abuse / Neglect Issues:   Patient did not serve in the .  There are indications or report of significant loss, trauma, abuse or neglect issues related to: death of her .  Concerns for possible neglect are not present.     Safety Assessment:   Patient denies current homicidal ideation and behaviors.  Patient denies current self-injurious ideation and behaviors.    Patient reported unsafe motor vehicle operation associated with substance use.  Patient reported substance use associated with mental health symptoms.  Patient reports the following current concerns for their  personal safety: None.  Patient reports there are not firearms in the house.     History of Safety Concerns:  Patient denied a history of homicidal ideation.     Patient denied a history of personal safety concerns.    Patient denied a history of assaultive behaviors.    Patient denied a history of sexual assault behaviors.     Patient reported a history unsafe motor vehicle operation associated with substance use.  Patient reported a history of substance use associated with mental health symptoms.  Patient reports the following protective factors: forward or future oriented thinking; dedication to family or friends; safe and stable environment; regular sleep; effectively controls impulses; regular physical activity; sense of belonging; purpose; abstinence from substances; adherence with prescribed medication; agreement to use safety plan; living with other people; daily obligations; structured day; effective problem solving skills; commitment to well being; sense of meaning; positive social skills; healthy fear of risky behaviors or pain; financial stability; strong sense of self worth or esteem; sense of personal control or determination    Risk Plan:  See Recommendations for Safety and Risk Management Plan    Review of Symptoms per patient report:  Substance Use:  blackouts, hangovers, family relationship problems due to substance use, social problems related to substance use and driving under the influence     Collateral Contact Summary:   Collateral contacts contributing to this assessment:  The patient's electronic medical records were reviewed at time of assessment.    No additional collateral data had been obtained at the time of this documentation.     If court related records were reviewed, summarize here: None    Information from collateral contacts supported/largely agreed with information from the client and associated risk ratings.    Information in this assessment was obtained from the medical record  and provided by patient who is a good historian.    Patient will have open access to their mental health medical record.    Diagnostic Criteria:   1.)  Substance is often taken in larger amounts or over a longer period than was intended.  Met for:  alcohol.  2.)  There is persistent desire or unsuccessful efforts to cut down or control use of the substance.  Met for:  alcohol.  3.)  A great deal of time is spent in activities necessary to obtain the substance, use the substance, or recover from its effects.  Met for:  alcohol.  5.)  Recurrent use of the substance resulting in a failure to fulfill major role obligations at work, school, or home.  Met for:  alcohol.  9.)  Use of the substance is continued despite knowledge of having a persistent or recurrent physical or psychological problem that is likely to have been cause or exacerbated by the substance.  Met for:  alcohol.  10.)  Tolerance:  either a need for markedly increased amounts of the substance to achieve the desired effect or a markedly diminished effect with continued use of the dame amount of the substance.  Met for:  alcohol.    As evidenced by self report and criteria, the patient meets the following DSM-5 Diagnoses: (Sustained by DSM-5 Criteria Listed Above)      Alcohol Use Disorder Severe - 303.90 (F10.20)    Specify if: In early remission:  After full criteria for alcohol/drug use disorder were previously met, none of the criteria for alcohol/drug use disorder have been met for at least 3 months but for less than 12 months (with the exception that Criterion A4,  Craving or a strong desire or urge to use alcohol/drug  may be met).     In sustained remission:   After full criteria for alcohol use disorder were previously met, none of the criteria for alcohol/drug use disorder have been met at any time during a period of 12 months or longer (with the exception that Criterion A4,  Craving or strong desire or urge to use alcohol/drug  may be met).      Specify if:   This additional specifier is used if the individual is in an environment where access to alcohol is restricted.    Mild: Presence of 2-3 symptoms  Moderate: Presence of 4-5 symptoms  Severe: Presence of 6 or more symptoms    Recommendations:     1. Plan for Safety and Risk Management:  Recommended that patient call 911 or go to the local ED should there be a change in any of these risk factors.      Report to child / adult protection services was NA.     2. SUE Recommendations:      1)  Attend 6 individual psychotherapy sessions that offer a chemical dependency component to it.  2)  Abstain from all mood-altering chemicals unless prescribed by a licensed provider.   3)  Attend a weekly support group meetings, such as 12 step based (AA/NA), SMART Recovery, Health Realizations, and/or Refuge Recovery meetings.     4)  Actively work with a female mentor/sponsor on a weekly basis.   5)  Follow all the recommendations of your treatment/medical providers.  6)  Continue to attend your scheduled individual psychotherapy appointments.      The patient does not have a history of opiate use.    3.  Mental Health Referrals:     The patient would benefit from participating in 1:1 counseling sessions with an in-network 1:1 mental health therapist to assist her with issues surrounding grief and loss.    4. Cultural Concerns:    The patient did not identify having any cultural concerns regarding mental health, physical health, or substance use issues.     5. Recommendations for treatment focus:      Alcohol / Substance Use - See #2. SUE Referrals above for details on recommendations.  Grief / Loss - See #3. Mental Health Referrals above for details.    6.  Referrals:   [facility] Psychology [facility] Health Services  [facility] [address] [place] [address]  Phone: [phone]  Fax: [phone]    Clinical Substantiation:     Met with patient individually on 8/17/22 for  comprehensive assessment including summary of assessment and conclusion, assessment risk and appropriate steps taken, documentation to support the diagnosis, rationale for disposition and appropriate level of care recommended and alternative for treatment options.  Patient stated that she completed IP CD treatment at [facility] February 2021 and then went to [facility], completing that program on 4/26/2021.  Patient said she continued with aftercare at [facility] when she completed the programming and remained sober.  Patient met her  in February 2022 while attending relapse prevention groups with [facility], and was engaged within a month of dating and  on 6/6/2022.  Patient said she found out in May that her  had still been drinking and was hiding his use.  Patient's  passed away from an alcohol related complications (seizure) on 7/6/22.  Patient said she relapsed and began drinking again due to grief and loss issues related to losing her  after only a month of marriage.  Patient said that she has quit drinking and is committed to lifelong sobriety.  Patient has already entered individual counseling with her preferred provider and plans to begin attending sober support groups again, [facility], beginning 8/22/22.  Patient has also relocated to living with her daughter again, as she said living in the home where she watched her  die was a trigger and she was recently diagnosed with acute trauma disorder. At this time, patient feels that she needs more help with mental health issues related to grief and loss and has a plan for her continued sobriety.    Rational for recommended level of care: The patient has dual issues of mental health and substance abuse.    The patient reported she is willing to follow the above recommendations.    The patient would like the following family or other support people involved in their treatment:  None at this time.    [place]  Assessment ID: [id number]    Provider Name/ Credentials:  Ghazal Stafford MA, [facility]  August 17, 2022

## 2022-08-17 NOTE — PATIENT INSTRUCTIONS
Recommendations:      1. Plan for Safety and Risk Management:  Recommended that patient call 911 or go to the local ED should there be a change in any of these risk factors.                          Report to child / adult protection services was NA.      2. SUE Recommendations:       1)  Attend 6 individual psychotherapy sessions that offer a chemical dependency component to it.  2)  Abstain from all mood-altering chemicals unless prescribed by a licensed provider.   3)  Attend a weekly support group meetings, such as 12 step based (AA/NA), SMART Recovery, Health Realizations, and/or Refuge Recovery meetings.     4)  Actively work with a female mentor/sponsor on a weekly basis.   5)  Follow all the recommendations of your treatment/medical providers.  6)  Continue to attend your scheduled individual psychotherapy appointments.       The patient does not have a history of opiate use.     3.  Mental Health Referrals:                The patient would benefit from participating in 1:1 counseling sessions with an in-network 1:1 mental health therapist to assist her with issues surrounding grief and loss.     4. Cultural Concerns:     The patient did not identify having any cultural concerns regarding mental health, physical health, or substance use issues.      5. Recommendations for treatment focus:                 Alcohol / Substance Use - See #2. SUE Referrals above for details on recommendations.  Grief / Loss - See #3. Mental Health Referrals above for details.     6.  Referrals:   [facility] Psychology and Health Services  [facility] - [address]  [address] [place] [address]  Phone: [phone]  Fax: [phone]

## 2022-10-03 ENCOUNTER — HEALTH MAINTENANCE LETTER (OUTPATIENT)
Age: 52
End: 2022-10-03

## 2022-12-08 ENCOUNTER — TELEPHONE (OUTPATIENT)
Dept: BEHAVIORAL HEALTH | Facility: CLINIC | Age: 52
End: 2022-12-08

## 2022-12-08 ENCOUNTER — HOSPITAL ENCOUNTER (EMERGENCY)
Facility: HOSPITAL | Age: 52
Discharge: STILL ADMITTED OR ADMINISTRATIVE | End: 2022-12-10
Attending: EMERGENCY MEDICINE | Admitting: EMERGENCY MEDICINE
Payer: COMMERCIAL

## 2022-12-08 DIAGNOSIS — T14.91XA SUICIDE ATTEMPT (H): ICD-10-CM

## 2022-12-08 DIAGNOSIS — F10.920 ALCOHOLIC INTOXICATION WITHOUT COMPLICATION (H): ICD-10-CM

## 2022-12-08 DIAGNOSIS — T50.902A OVERDOSE, INTENTIONAL SELF-HARM, INITIAL ENCOUNTER (H): ICD-10-CM

## 2022-12-08 PROBLEM — R00.2 INTERMITTENT PALPITATIONS: Status: ACTIVE | Noted: 2022-12-08

## 2022-12-08 PROBLEM — R45.851 SUICIDAL IDEATION: Status: ACTIVE | Noted: 2021-02-03

## 2022-12-08 LAB
ALBUMIN SERPL BCG-MCNC: 4.3 G/DL (ref 3.5–5.2)
ALP SERPL-CCNC: 54 U/L (ref 35–104)
ALT SERPL W P-5'-P-CCNC: 22 U/L (ref 10–35)
AMPHETAMINES UR QL SCN: ABNORMAL
ANION GAP SERPL CALCULATED.3IONS-SCNC: 13 MMOL/L (ref 7–15)
APAP SERPL-MCNC: <5 UG/ML (ref 10–30)
AST SERPL W P-5'-P-CCNC: 24 U/L (ref 10–35)
BARBITURATES UR QL SCN: ABNORMAL
BASOPHILS # BLD AUTO: 0 10E3/UL (ref 0–0.2)
BASOPHILS NFR BLD AUTO: 0 %
BENZODIAZ UR QL SCN: ABNORMAL
BILIRUB SERPL-MCNC: 0.2 MG/DL
BUN SERPL-MCNC: 6.3 MG/DL (ref 6–20)
BZE UR QL SCN: ABNORMAL
CALCIUM SERPL-MCNC: 8.1 MG/DL (ref 8.6–10)
CANNABINOIDS UR QL SCN: ABNORMAL
CHLORIDE SERPL-SCNC: 107 MMOL/L (ref 98–107)
CK SERPL-CCNC: 146 U/L (ref 26–192)
CREAT SERPL-MCNC: 0.82 MG/DL (ref 0.51–0.95)
DEPRECATED HCO3 PLAS-SCNC: 22 MMOL/L (ref 22–29)
EOSINOPHIL # BLD AUTO: 0 10E3/UL (ref 0–0.7)
EOSINOPHIL NFR BLD AUTO: 0 %
ERYTHROCYTE [DISTWIDTH] IN BLOOD BY AUTOMATED COUNT: 14 % (ref 10–15)
ETHANOL SERPL-MCNC: 0.17 G/DL
GFR SERPL CREATININE-BSD FRML MDRD: 87 ML/MIN/1.73M2
GLUCOSE SERPL-MCNC: 118 MG/DL (ref 70–99)
HCT VFR BLD AUTO: 44.7 % (ref 35–47)
HGB BLD-MCNC: 14.7 G/DL (ref 11.7–15.7)
HOLD SPECIMEN: NORMAL
HOLD SPECIMEN: NORMAL
IMM GRANULOCYTES # BLD: 0 10E3/UL
IMM GRANULOCYTES NFR BLD: 0 %
LYMPHOCYTES # BLD AUTO: 1.6 10E3/UL (ref 0.8–5.3)
LYMPHOCYTES NFR BLD AUTO: 21 %
MAGNESIUM SERPL-MCNC: 2.2 MG/DL (ref 1.7–2.3)
MCH RBC QN AUTO: 29.5 PG (ref 26.5–33)
MCHC RBC AUTO-ENTMCNC: 32.9 G/DL (ref 31.5–36.5)
MCV RBC AUTO: 90 FL (ref 78–100)
MONOCYTES # BLD AUTO: 0.3 10E3/UL (ref 0–1.3)
MONOCYTES NFR BLD AUTO: 4 %
NEUTROPHILS # BLD AUTO: 5.7 10E3/UL (ref 1.6–8.3)
NEUTROPHILS NFR BLD AUTO: 75 %
NRBC # BLD AUTO: 0 10E3/UL
NRBC BLD AUTO-RTO: 0 /100
OPIATES UR QL SCN: ABNORMAL
PCP QUAL URINE (ROCHE): ABNORMAL
PLATELET # BLD AUTO: 270 10E3/UL (ref 150–450)
POTASSIUM SERPL-SCNC: 3.5 MMOL/L (ref 3.4–5.3)
PROT SERPL-MCNC: 6.9 G/DL (ref 6.4–8.3)
RBC # BLD AUTO: 4.99 10E6/UL (ref 3.8–5.2)
SALICYLATES SERPL-MCNC: <0.5 MG/DL
SARS-COV-2 RNA RESP QL NAA+PROBE: NEGATIVE
SODIUM SERPL-SCNC: 142 MMOL/L (ref 136–145)
WBC # BLD AUTO: 7.6 10E3/UL (ref 4–11)

## 2022-12-08 PROCEDURE — 96366 THER/PROPH/DIAG IV INF ADDON: CPT

## 2022-12-08 PROCEDURE — 36415 COLL VENOUS BLD VENIPUNCTURE: CPT | Performed by: EMERGENCY MEDICINE

## 2022-12-08 PROCEDURE — 82550 ASSAY OF CK (CPK): CPT | Performed by: EMERGENCY MEDICINE

## 2022-12-08 PROCEDURE — 82077 ASSAY SPEC XCP UR&BREATH IA: CPT | Performed by: EMERGENCY MEDICINE

## 2022-12-08 PROCEDURE — C9803 HOPD COVID-19 SPEC COLLECT: HCPCS

## 2022-12-08 PROCEDURE — 93005 ELECTROCARDIOGRAM TRACING: CPT | Performed by: EMERGENCY MEDICINE

## 2022-12-08 PROCEDURE — 99291 CRITICAL CARE FIRST HOUR: CPT | Mod: 25

## 2022-12-08 PROCEDURE — 96375 TX/PRO/DX INJ NEW DRUG ADDON: CPT

## 2022-12-08 PROCEDURE — 96367 TX/PROPH/DG ADDL SEQ IV INF: CPT

## 2022-12-08 PROCEDURE — 85025 COMPLETE CBC W/AUTO DIFF WBC: CPT | Performed by: EMERGENCY MEDICINE

## 2022-12-08 PROCEDURE — 83735 ASSAY OF MAGNESIUM: CPT | Performed by: EMERGENCY MEDICINE

## 2022-12-08 PROCEDURE — 99285 EMERGENCY DEPT VISIT HI MDM: CPT | Mod: 25

## 2022-12-08 PROCEDURE — 96361 HYDRATE IV INFUSION ADD-ON: CPT

## 2022-12-08 PROCEDURE — U0005 INFEC AGEN DETEC AMPLI PROBE: HCPCS | Performed by: EMERGENCY MEDICINE

## 2022-12-08 PROCEDURE — 80053 COMPREHEN METABOLIC PANEL: CPT | Performed by: EMERGENCY MEDICINE

## 2022-12-08 PROCEDURE — 250N000013 HC RX MED GY IP 250 OP 250 PS 637: Performed by: EMERGENCY MEDICINE

## 2022-12-08 PROCEDURE — 80179 DRUG ASSAY SALICYLATE: CPT | Performed by: EMERGENCY MEDICINE

## 2022-12-08 PROCEDURE — 80143 DRUG ASSAY ACETAMINOPHEN: CPT | Performed by: EMERGENCY MEDICINE

## 2022-12-08 PROCEDURE — 90791 PSYCH DIAGNOSTIC EVALUATION: CPT

## 2022-12-08 PROCEDURE — 80307 DRUG TEST PRSMV CHEM ANLYZR: CPT | Performed by: EMERGENCY MEDICINE

## 2022-12-08 PROCEDURE — 258N000003 HC RX IP 258 OP 636: Performed by: EMERGENCY MEDICINE

## 2022-12-08 PROCEDURE — 250N000011 HC RX IP 250 OP 636: Performed by: EMERGENCY MEDICINE

## 2022-12-08 PROCEDURE — 96365 THER/PROPH/DIAG IV INF INIT: CPT

## 2022-12-08 RX ORDER — MAGNESIUM SULFATE HEPTAHYDRATE 40 MG/ML
2 INJECTION, SOLUTION INTRAVENOUS ONCE
Status: COMPLETED | OUTPATIENT
Start: 2022-12-08 | End: 2022-12-08

## 2022-12-08 RX ORDER — SODIUM CHLORIDE 9 MG/ML
INJECTION, SOLUTION INTRAVENOUS CONTINUOUS
Status: DISCONTINUED | OUTPATIENT
Start: 2022-12-08 | End: 2022-12-09

## 2022-12-08 RX ORDER — POTASSIUM CHLORIDE 1500 MG/1
40 TABLET, EXTENDED RELEASE ORAL ONCE
Status: COMPLETED | OUTPATIENT
Start: 2022-12-08 | End: 2022-12-08

## 2022-12-08 RX ORDER — ACETAMINOPHEN 325 MG/1
650 TABLET ORAL ONCE
Status: COMPLETED | OUTPATIENT
Start: 2022-12-09 | End: 2022-12-09

## 2022-12-08 RX ORDER — ONDANSETRON 2 MG/ML
4 INJECTION INTRAMUSCULAR; INTRAVENOUS ONCE
Status: COMPLETED | OUTPATIENT
Start: 2022-12-08 | End: 2022-12-08

## 2022-12-08 RX ORDER — POTASSIUM CHLORIDE 7.45 MG/ML
10 INJECTION INTRAVENOUS ONCE
Status: COMPLETED | OUTPATIENT
Start: 2022-12-08 | End: 2022-12-08

## 2022-12-08 RX ADMIN — POTASSIUM CHLORIDE 40 MEQ: 1500 TABLET, EXTENDED RELEASE ORAL at 16:51

## 2022-12-08 RX ADMIN — POTASSIUM CHLORIDE 10 MEQ: 7.46 INJECTION, SOLUTION INTRAVENOUS at 16:52

## 2022-12-08 RX ADMIN — SODIUM CHLORIDE: 9 INJECTION, SOLUTION INTRAVENOUS at 14:58

## 2022-12-08 RX ADMIN — MAGNESIUM SULFATE HEPTAHYDRATE 2 G: 40 INJECTION, SOLUTION INTRAVENOUS at 13:59

## 2022-12-08 RX ADMIN — ONDANSETRON 4 MG: 2 INJECTION INTRAMUSCULAR; INTRAVENOUS at 19:22

## 2022-12-08 RX ADMIN — SODIUM CHLORIDE 1000 ML: 9 INJECTION, SOLUTION INTRAVENOUS at 13:37

## 2022-12-08 ASSESSMENT — COLUMBIA-SUICIDE SEVERITY RATING SCALE - C-SSRS
FIRST ATTEMPT DATE: 66451
5. HAVE YOU STARTED TO WORK OUT OR WORKED OUT THE DETAILS OF HOW TO KILL YOURSELF? DO YOU INTEND TO CARRY OUT THIS PLAN?: YES
4. HAVE YOU HAD THESE THOUGHTS AND HAD SOME INTENTION OF ACTING ON THEM?: NO
5. HAVE YOU STARTED TO WORK OUT OR WORKED OUT THE DETAILS OF HOW TO KILL YOURSELF? DO YOU INTEND TO CARRY OUT THIS PLAN?: YES
LETHALITY/MEDICAL DAMAGE CODE FIRST ACTUAL ATTEMPT: MODERATE PHYSICAL DAMAGE, MEDICAL ATTENTION NEEDED
2. HAVE YOU ACTUALLY HAD ANY THOUGHTS OF KILLING YOURSELF?: YES
4. HAVE YOU HAD THESE THOUGHTS AND HAD SOME INTENTION OF ACTING ON THEM?: NO
LETHALITY/MEDICAL DAMAGE CODE FIRST POTENTIAL ATTEMPT: BEHAVIOR LIKELY TO RESULT IN INJURY BUT NOT LIKELY TO CAUSE DEATH
MOST RECENT DATE: 66451
1. HAVE YOU WISHED YOU WERE DEAD OR WISHED YOU COULD GO TO SLEEP AND NOT WAKE UP?: YES
LETHALITY/MEDICAL DAMAGE CODE MOST RECENT ACTUAL ATTEMPT: MODERATE PHYSICAL DAMAGE, MEDICAL ATTENTION NEEDED
LETHALITY/MEDICAL DAMAGE CODE MOST LETHAL POTENTIAL ATTEMPT: BEHAVIOR LIKELY TO RESULT IN INJURY BUT NOT LIKELY TO CAUSE DEATH
1. IN THE PAST MONTH, HAVE YOU WISHED YOU WERE DEAD OR WISHED YOU COULD GO TO SLEEP AND NOT WAKE UP?: YES
TOTAL  NUMBER OF ABORTED OR SELF INTERRUPTED ATTEMPTS LIFETIME: NO
LETHALITY/MEDICAL DAMAGE CODE MOST RECENT POTENTIAL ATTEMPT: BEHAVIOR LIKELY TO RESULT IN INJURY BUT NOT LIKELY TO CAUSE DEATH
ATTEMPT PAST THREE MONTHS: YES
REASONS FOR IDEATION LIFETIME: MOSTLY TO END OR STOP THE PAIN (YOU COULDN'T GO ON LIVING WITH THE PAIN OR HOW YOU WERE FEELING)
TOTAL  NUMBER OF ACTUAL ATTEMPTS PAST 3 MONTHS: 1
2. HAVE YOU ACTUALLY HAD ANY THOUGHTS OF KILLING YOURSELF?: YES
TOTAL  NUMBER OF ACTUAL ATTEMPTS LIFETIME: 1
TOTAL  NUMBER OF INTERRUPTED ATTEMPTS LIFETIME: NO
MOST LETHAL DATE: 66451
ATTEMPT LIFETIME: YES
REASONS FOR IDEATION PAST MONTH: MOSTLY TO END OR STOP THE PAIN (YOU COULDN'T GO ON LIVING WITH THE PAIN OR HOW YOU WERE FEELING)
6. HAVE YOU EVER DONE ANYTHING, STARTED TO DO ANYTHING, OR PREPARED TO DO ANYTHING TO END YOUR LIFE?: NO
LETHALITY/MEDICAL DAMAGE CODE MOST LETHAL ACTUAL ATTEMPT: MODERATE PHYSICAL DAMAGE, MEDICAL ATTENTION NEEDED
3. HAVE YOU BEEN THINKING ABOUT HOW YOU MIGHT KILL YOURSELF?: NO

## 2022-12-08 ASSESSMENT — ACTIVITIES OF DAILY LIVING (ADL)
ADLS_ACUITY_SCORE: 39
ADLS_ACUITY_SCORE: 35
ADLS_ACUITY_SCORE: 39

## 2022-12-08 NOTE — PROGRESS NOTES
"Pt in a pleasant and cooperative mood. Pt starting to wake up more and telling 1:1 about her life events that lead up to her admission. Pt states she  a gentleman 2022 and he passed away 2022, which she states has been very hard for her. \"We were like twins, we were so alike.\" Pt found out yesterday that her  's friend, who is in charge of his will, said that Holly, \"only  him for his money.\" Pt states this was probably a contribution to her drinking/taking medications yesterday.    Pt's 19 year old daughter visiting this afternoon and was very supportive, kind and mature about the situation. Pt's daughter reminding pt that, \"we will get through this, like we always do\" and \"you have huge support system, you have a lot of friends and family that care about you.\"     Pt's  also came and visited pt for awhile this afternoon.     LUX FRANCISCO RN  "

## 2022-12-08 NOTE — ED PROVIDER NOTES
EMERGENCY DEPARTMENT ENCOUNTER      NAME: Holly Briseno  AGE: 52 year old female  YOB: 1970  MRN: [id number]  EVALUATION DATE & TIME: 2022  1:18 PM    PCP: No Ref-Primary, Physician    ED PROVIDER: Karen Sahu MD    Chief Complaint   Patient presents with     Altered Mental Status         FINAL IMPRESSION:  1. Overdose, intentional self-harm, initial encounter (H)    2. Suicide attempt (H)    3. Alcoholic intoxication without complication (H)          ED COURSE & MEDICAL DECISION MAKIN:16 PM I introduced myself to the patient, obtained patient history, performed a physical exam, and discussed plan for ED workup including potential diagnostic laboratory/imaging studies and interventions.  1:48 PM UA completed  2:39 PM EKG completed  3:10 PM ED urine drug lab results out     Pertinent Labs & Imaging studies reviewed. (See chart for details)  52 year old female with history of depression, anxiety, alcoholism/withdrawal who presents to the Emergency Department for evaluation of overdose.  Overdosed on unknown substance overnight after daughter went to work.  Preceding conversation with suicidal ideation prior to daughter going to work.  On exam patient is drowsy, dried vomitus.  Differential includes alcohol intoxication, polysubstance overdose.  She has access to hydroxyzine, Prozac and Soma.  These could certainly cause some anticholinergic effects as well as the clonus seen on exam from her Prozac.  Concern for CO-ingestion.    Patient met by myself upon ED arrival, placed on monitor, IV established and blood obtained.  Twelve-lead EKG shows sinus rhythm.  QT is slightly prolonged.  Given 2 g of mag.  EKG repeated after this with improvement of the QT interval.  CBC, CMP, aspirin, Tylenol level, CK, blood alcohol level, urine drug screen, magnesium notable for positive cannabinoids.  Blood alcohol level 0.17.  COVID swab negative.  Patient was given potassium to keep potassium greater  "than 4, normal saline bolus and infusion.  Zofran for some ongoing nausea.  Poison Control Center contacted early on ED course and agrees with above plan.  Patient medically cleared for psychiatric evaluation.  Was seen and evaluated by DEC .  Feels that patient is high risk to go home.  Patient voluntary but holdable at this time and will look for inpatient psych bed.      ED Course as of 12/08/22 2023   Thu Dec 08, 2022   1340 Case discussed with Poison Control Center.  Agreed with laboratory work-up and treatment as ordered.  Recommend repeat EKG after magnesium to see if QT has shortened.   1407 Alcohol ethyl(!): 0.17   1512 Cannabinoids Qual Urine(!): Screen Positive   1521 Pt is medically cleared    1814 Spoke w DEC , Damaris, who spoke w pt/pt's daughter. Pt states impulsive and wouldn't have done this unless she was drinking. Daughter states hx of SI but never acted on them. Last night expressed SI to daughter and stated she would never act on it, but then OD'ed when daughter left for work. Daughter told pt she was happy she was alive, pt stated to daughter \"I guess I should be happy too.\"         Medical Decision Making    History:    Supplemental history from: EMS    External Record(s) reviewed: Documented in HPI, if applicable.    Work Up:    Chart documentation includes differential considered and any EKGs or imaging interpreted by provider.    In additional to work up documented, I considered the following work up: See chart documentation, if applicable.    External consultation:    Discussion of management with another provider: See chart documentation, if applicable    Complicating factors:    Care impacted by chronic illness: Mental Health    Care affected by social determinants of health: Alcohol Abuse and/or Recreational Drug Use    Disposition considerations: Admit.        At the conclusion of the encounter I discussed the results of all of the tests and the disposition. The " questions were answered. The patient or family acknowledged understanding and was agreeable with the care plan.    CONSULTS:  Poison Control Center  DEC     MEDICATIONS GIVEN IN THE EMERGENCY:  Medications   sodium chloride 0.9% infusion ( Intravenous Rate/Dose Verify 12/8/22 1707)   0.9% sodium chloride BOLUS (0 mLs Intravenous Stopped 12/8/22 1500)   magnesium sulfate 2 g in water intermittent infusion (0 g Intravenous Stopped 12/8/22 1430)   potassium chloride ER (KLOR-CON M) CR tablet 40 mEq (40 mEq Oral Given 12/8/22 1651)   potassium chloride 10 mEq in 100 mL sterile water infusion (10 mEq Intravenous New Bag 12/8/22 1652)   ondansetron (ZOFRAN) injection 4 mg (4 mg Intravenous Given 12/8/22 1922)       NEW PRESCRIPTIONS STARTED AT TODAY'S ER VISIT  New Prescriptions    No medications on file          =================================================================    HPI    Patient information was obtained from: EMS    Use of Intrepreter: N/A     Holly Briseno is a 52 year old female with pertinent medical history of alcohol use with withdrawal, anxiety and depressive disorder who presents to the ED via EMS for the evaluation of altered mental status.    Per EMS, the patient made a suicide statement to her daughter last night. Daughter left the house around 2 AM for work and called the patient later today who didn't answer so she called EMS. Medics found her unresponsive in her bed at home with green emesis along with an empty fireball bottle next to the bed. Daughter notes that she takes antidepressants that have a greenish color. BP was 103/65, O2 % on room air, and blood sugar 130 en route to the ED. Daughter notes she has an amoxacillin allergy. The patient has been to the ED before for suicidal ideations.   Patient arrives with an empty vial of hydroxyzine (25 MG, 60 capsules, filled 5/30/22), hydroxyzine (25 mg, 60 capsules, filled 12/9/2021), hydroxyzine (10 MG, 180 capsules, filled  22) and fluoxetine (20 MG, 90 capsules, filled 2022). Patient also arrived with a vial of methocarbamol (750 MG, 90 capsules, filled 2022) with 54 capsules left.     REVIEW OF SYSTEMS  ROS limited due to altered mental status.   Constitutional:  Denies fever, chills, weight loss or weakness.   Cardiovascular:  No CP, palpitations  GI:  Denies abdominal pain, nausea, diarrhea. Positive for vomiting.   Neurologic:  Denies headache      PAST MEDICAL HISTORY:  Past Medical History:   Diagnosis Date     Alcoholism (H)      Anxiety      Cancer (H) 2021    Melanoma     Depressive disorder February 3 2021       PAST SURGICAL HISTORY:  Past Surgical History:   Procedure Laterality Date     BIOPSY  2021    Melanoma       CURRENT MEDICATIONS:    Prior to Admission Medications   Prescriptions Last Dose Informant Patient Reported? Taking?   FLUoxetine (PROZAC) 20 MG capsule   Yes No   Sig: Take 20 mg by mouth   acetaminophen (TYLENOL) 500 MG tablet   Yes No   Sig: Take 500 mg by mouth   fluticasone (FLONASE) 50 MCG/ACT nasal spray   Yes No   Sig: Spray 1 spray in nostril   hydrOXYzine (ATARAX) 10 MG tablet   Yes No   Si-2 tabs up to three time daily as needed for anxiety or insomnia.  May cause drowsiness.   hydrOXYzine (VISTARIL) 25 MG capsule   Yes No   Sig: Take 25 mg by mouth   vitamin C with B complex (B COMPLEX-C) tablet   Yes No   Sig: Take 1 tablet by mouth daily   vitamin D3 (CHOLECALCIFEROL) 125 MCG (5000 UT) tablet   Yes No   Sig: Take 5,000 Units by mouth      Facility-Administered Medications: None       ALLERGIES:  Allergies   Allergen Reactions     Amoxicillin Rash     Per the Antimicrobial Stewardship Team 10/19:  No similarities in side chains for Amoxicillin and Ancef, very low to no risk of cross-reactivity.     Sulfa Drugs Hives, Itching and Rash       FAMILY HISTORY:  Family History   Problem Relation Age of Onset     Substance Abuse Mother      Substance Abuse Father   "      SOCIAL HISTORY:  Social History     Tobacco Use     Smoking status: Never     Smokeless tobacco: Never   Vaping Use     Vaping Use: Never used   Substance Use Topics     Alcohol use: Not Currently     Drug use: Never        VITALS:  Patient Vitals for the past 24 hrs:   BP Temp Temp src Pulse Resp SpO2 Height Weight   12/08/22 1915 99/58 -- -- 63 16 95 % -- --   12/08/22 1900 116/61 -- -- 79 16 98 % -- --   12/08/22 1845 128/67 -- -- 75 18 98 % -- --   12/08/22 1830 124/67 -- -- 62 16 94 % -- --   12/08/22 1815 124/61 -- -- 67 14 99 % -- --   12/08/22 1800 131/66 -- -- 78 18 99 % -- --   12/08/22 1745 139/66 -- -- 84 19 98 % -- --   12/08/22 1730 127/60 -- -- 65 13 93 % -- --   12/08/22 1715 (!) 141/65 -- -- 70 17 99 % -- --   12/08/22 1700 111/55 -- -- 64 18 99 % -- --   12/08/22 1645 106/59 -- -- 66 18 98 % -- --   12/08/22 1630 133/65 -- -- 82 23 98 % -- --   12/08/22 1615 116/58 -- -- 73 16 99 % -- --   12/08/22 1600 104/59 -- -- 63 16 95 % -- --   12/08/22 1545 106/62 -- -- 64 16 95 % -- --   12/08/22 1530 114/64 -- -- 79 17 100 % -- --   12/08/22 1515 95/51 -- -- 74 17 95 % -- --   12/08/22 1500 98/54 98.3  F (36.8  C) Oral 63 18 95 % -- --   12/08/22 1445 (!) 86/50 -- -- 61 17 95 % -- --   12/08/22 1430 92/53 -- -- 61 17 94 % -- --   12/08/22 1415 92/51 -- -- 61 16 95 % -- --   12/08/22 1400 100/58 -- -- 64 19 96 % -- --   12/08/22 1345 99/62 -- -- 66 18 94 % -- --   12/08/22 1330 98/57 -- -- 62 19 93 % -- --   12/08/22 1323 107/53 98.5  F (36.9  C) Oral 62 14 95 % 1.651 m (5' 5\") 61.2 kg (135 lb)       PHYSICAL EXAM    General Appearance: Dry green emesis on mouth, hair and fingers.   Head:  Normocephalic, atraumatic  Eyes:  PERRL, conjunctiva/corneas clear, EOM's intact. Pupils are reactive.   ENT:   membranes are moist without pallor  Neck:  Supple, no nuchal rigidity or meningismus, no midline tenderness palpation  Chest:  No tenderness or deformity, no crepitus  Cardio:  Regular rate and rhythm, no " murmur/gallop/rub, 2+ pulses symmetric in all extremities  Pulm:  No respiratory distress, clear to auscultation bilaterally  Back:  No midline tenderness to palpation, no paraspinal tenderness  Abdomen:  Soft, non-tender, non distended,no rebound or guarding.  Extremities:  Extremities normal, atraumatic, no cyanosis or edema, full ROM and motor tone intact, bilateral pulses intact upper and lower.  Moves all extremities spontaneously.   Skin:  Skin warm, dry, no rashes  Neuro: Laying in bed drowsy with eyes closed, but opens eyes slightly to voice. Not speaking, but when asked a question, will respond.  Moves all 4 extremities equally.  No gross focal neuro findings.Fairly impressive inducible clonus in lower extremities, but no rigidity.     RADIOLOGY/LABS:  Reviewed all pertinent imaging. Please see official radiology report. All pertinent labs reviewed and interpreted.    Results for orders placed or performed during the hospital encounter of 12/08/22   Comprehensive metabolic panel   Result Value Ref Range    Sodium 142 136 - 145 mmol/L    Potassium 3.5 3.4 - 5.3 mmol/L    Chloride 107 98 - 107 mmol/L    Carbon Dioxide (CO2) 22 22 - 29 mmol/L    Anion Gap 13 7 - 15 mmol/L    Urea Nitrogen 6.3 6.0 - 20.0 mg/dL    Creatinine 0.82 0.51 - 0.95 mg/dL    Calcium 8.1 (L) 8.6 - 10.0 mg/dL    Glucose 118 (H) 70 - 99 mg/dL    Alkaline Phosphatase 54 35 - 104 U/L    AST 24 10 - 35 U/L    ALT 22 10 - 35 U/L    Protein Total 6.9 6.4 - 8.3 g/dL    Albumin 4.3 3.5 - 5.2 g/dL    Bilirubin Total 0.2 <=1.2 mg/dL    GFR Estimate 87 >60 mL/min/1.73m2   Result Value Ref Range     26 - 192 U/L   Result Value Ref Range    Alcohol ethyl 0.17 (H) <=0.01 g/dL   Salicylate level   Result Value Ref Range    Salicylate <0.5   mg/dL   Acetaminophen level   Result Value Ref Range    Acetaminophen <5.0 (L) 10.0 - 30.0 ug/mL   CBC with platelets and differential   Result Value Ref Range    WBC Count 7.6 4.0 - 11.0 10e3/uL    RBC Count  4.99 3.80 - 5.20 10e6/uL    Hemoglobin 14.7 11.7 - 15.7 g/dL    Hematocrit 44.7 35.0 - 47.0 %    MCV 90 78 - 100 fL    MCH 29.5 26.5 - 33.0 pg    MCHC 32.9 31.5 - 36.5 g/dL    RDW 14.0 10.0 - 15.0 %    Platelet Count 270 150 - 450 10e3/uL    % Neutrophils 75 %    % Lymphocytes 21 %    % Monocytes 4 %    % Eosinophils 0 %    % Basophils 0 %    % Immature Granulocytes 0 %    NRBCs per 100 WBC 0 <1 /100    Absolute Neutrophils 5.7 1.6 - 8.3 10e3/uL    Absolute Lymphocytes 1.6 0.8 - 5.3 10e3/uL    Absolute Monocytes 0.3 0.0 - 1.3 10e3/uL    Absolute Eosinophils 0.0 0.0 - 0.7 10e3/uL    Absolute Basophils 0.0 0.0 - 0.2 10e3/uL    Absolute Immature Granulocytes 0.0 <=0.4 10e3/uL    Absolute NRBCs 0.0 10e3/uL   Drug abuse screen 77 urine (FL, RH, SH)   Result Value Ref Range    Amphetamines Urine Screen Negative Screen Negative    Barbituates Urine Screen Negative Screen Negative    Benzodiazepine Urine Screen Negative Screen Negative    Cannabinoids Urine Screen Positive (A) Screen Negative    Opiates Urine Screen Negative Screen Negative    PCP Urine Screen Negative Screen Negative    Cocaine Urine Screen Negative Screen Negative   Extra Green Top (Lithium Heparin) ON ICE   Result Value Ref Range    Hold Specimen JIC    Extra Blue Top Tube   Result Value Ref Range    Hold Specimen JIC    Result Value Ref Range    Magnesium 2.2 1.7 - 2.3 mg/dL   Asymptomatic COVID-19 Virus (Coronavirus) by PCR Nasopharyngeal    Specimen: Nasopharyngeal; Swab   Result Value Ref Range    SARS CoV2 PCR Negative Negative       EKG:  Performed at: 12/8/2022 13:28     Impression: Sinus rhythm, nonspecific T wave abnormality, abnormal ECG    Rate: 62 BPM  Rhythm: Sinus rhythm  Axis: 45  ME Interval: 146 ms  QRS Interval: 88  QTc Interval: 578 ms  ST Changes: N/A  Comparison: 7/30/2022 17:25  I have independently reviewed and interpreted the EKG(s) documented above.    Performed at: 12/8/2022 14:34     Impression: Sinus rhythm, nonspecific t wave  abnormality, prolonged QT    Rate: 62 BPM  Rhythm: Sinus rhythm  Axis: 52   NC Interval: 162 ms  QRS Interval: 90 ms  QTc Interval: 499 ms  ST Changes: N/A  Comparison: 12/8/2022 13:28   I have independently reviewed and interpreted the EKG(s) documented above      The creation of this record is based on the scribe s observations of the work being performed by Karen Sahu MD and the provider s statements to them. It was created on her behalf by Chase Larson, a trained medical scribe. This document has been checked and approved by the attending provider.    Karen Sahu MD  Emergency Medicine  [facility] EMERGENCY DEPARTMENT  [facility] [facility] [id number] [phone]  Dept: [phone]     Karen Sahu MD  12/08/22 2025

## 2022-12-08 NOTE — ED NOTES
Abelino from poison control called regarding patient status update. Recommended potassium replacements and drawing a magnesium level to help manage prolonged QT. Dr. Caballero updated.

## 2022-12-08 NOTE — ED TRIAGE NOTES
Pt made a suicidal statement to her daughter last night.  Pt has a history of this.  Daughter went to work today.  She called her mom to check on her today without response. Daughter called 911--medics found pt in bed with green vomit in the bed and an empty fireball bottle next to the bed.  Pt lethargic, some responsive with physical stimuli--HC=118

## 2022-12-09 PROCEDURE — 250N000013 HC RX MED GY IP 250 OP 250 PS 637: Performed by: EMERGENCY MEDICINE

## 2022-12-09 PROCEDURE — 258N000003 HC RX IP 258 OP 636: Performed by: EMERGENCY MEDICINE

## 2022-12-09 RX ORDER — SOFT LENS ADJUNCTIVE SOLUTIONS
1-2 DROPS OPHTHALMIC (EYE) 2 TIMES DAILY
Status: ON HOLD | COMMUNITY
End: 2022-12-12

## 2022-12-09 RX ORDER — IBUPROFEN 200 MG
800 TABLET ORAL EVERY 8 HOURS PRN
Status: ON HOLD | COMMUNITY
End: 2022-12-12

## 2022-12-09 RX ORDER — CALCIUM CARBONATE/VITAMIN D3 500-10/5ML
400 LIQUID (ML) ORAL 2 TIMES DAILY
Status: ON HOLD | COMMUNITY
End: 2022-12-12

## 2022-12-09 RX ORDER — ATORVASTATIN CALCIUM 10 MG/1
10 TABLET, FILM COATED ORAL AT BEDTIME
Status: ON HOLD | COMMUNITY
End: 2022-12-12

## 2022-12-09 RX ORDER — METHOCARBAMOL 750 MG/1
750 TABLET, FILM COATED ORAL 4 TIMES DAILY PRN
Status: ON HOLD | COMMUNITY
End: 2022-12-12

## 2022-12-09 RX ADMIN — ACETAMINOPHEN 650 MG: 325 TABLET ORAL at 00:03

## 2022-12-09 RX ADMIN — SODIUM CHLORIDE: 9 INJECTION, SOLUTION INTRAVENOUS at 00:55

## 2022-12-09 RX ADMIN — FLUOXETINE 20 MG: 20 CAPSULE ORAL at 16:23

## 2022-12-09 ASSESSMENT — ACTIVITIES OF DAILY LIVING (ADL)
ADLS_ACUITY_SCORE: 39

## 2022-12-09 NOTE — ED NOTES
[facility] ED Mental Health Handoff Note:     Assuming care from: Dr Karen Sahu    Brief HPI: 52 year old female signed out to me by the above provider. See initial ED Provider note for full details of the presentation.   In brief, patient made suicidal statement to her daughter last night and when the patient's daughter called the patient today there was no answer, so the patient's daughter called EMS for a welfare check. EMS found the patient unresponsive in bed with green emesis and an empty bottle of fireball next to the bed. Daughter reported patient takes antidepressants that are a green color. The patient has been to the ED before for suicidal ideations.      Patient arrives with an empty vial of hydroxyzine (25 MG, 60 capsules, filled 5/30/22), hydroxyzine (25 mg, 60 capsules, filled 12/9/2021), hydroxyzine (10 MG, 180 capsules, filled 9/13/22) and fluoxetine (20 MG, 90 capsules, filled 9/13/2022). Patient also arrived with a vial of methocarbamol (750 MG, 90 capsules, filled 6/24/2022) with 54 capsules left.    Home meds reviewed and ordered/administered: No  Medically stable for inpatient mental health admission: Yes.  Evaluated by mental health: Yes. The recommendation is for inpatient mental health treatment. Bed search in process  Safety concerns: At the time I received sign out, there were no safety concerns.    Hold Status:  Active Orders   N/A       Labs/Imaging:   Results for orders placed or performed during the hospital encounter of 12/08/22 (from the past 24 hour(s))   CBC with platelets differential     Status: None    Collection Time: 12/08/22  1:32 PM    Narrative    The following orders were created for panel order CBC with platelets differential.  Procedure                               Abnormality         Status                     ---------                               -----------         ------                     CBC with platelets and d...[[id number]]                       Final result                 Please view results for these tests on the individual orders.   Comprehensive metabolic panel     Status: Abnormal    Collection Time: 12/08/22  1:32 PM   Result Value Ref Range    Sodium 142 136 - 145 mmol/L    Potassium 3.5 3.4 - 5.3 mmol/L    Chloride 107 98 - 107 mmol/L    Carbon Dioxide (CO2) 22 22 - 29 mmol/L    Anion Gap 13 7 - 15 mmol/L    Urea Nitrogen 6.3 6.0 - 20.0 mg/dL    Creatinine 0.82 0.51 - 0.95 mg/dL    Calcium 8.1 (L) 8.6 - 10.0 mg/dL    Glucose 118 (H) 70 - 99 mg/dL    Alkaline Phosphatase 54 35 - 104 U/L    AST 24 10 - 35 U/L    ALT 22 10 - 35 U/L    Protein Total 6.9 6.4 - 8.3 g/dL    Albumin 4.3 3.5 - 5.2 g/dL    Bilirubin Total 0.2 <=1.2 mg/dL    GFR Estimate 87 >60 mL/min/1.73m2   CK total     Status: Normal    Collection Time: 12/08/22  1:32 PM   Result Value Ref Range     26 - 192 U/L   Alcohol ethyl     Status: Abnormal    Collection Time: 12/08/22  1:32 PM   Result Value Ref Range    Alcohol ethyl 0.17 (H) <=0.01 g/dL   Salicylate level     Status: Normal    Collection Time: 12/08/22  1:32 PM   Result Value Ref Range    Salicylate <0.5   mg/dL   Acetaminophen level     Status: Abnormal    Collection Time: 12/08/22  1:32 PM   Result Value Ref Range    Acetaminophen <5.0 (L) 10.0 - 30.0 ug/mL   CBC with platelets and differential     Status: None    Collection Time: 12/08/22  1:32 PM   Result Value Ref Range    WBC Count 7.6 4.0 - 11.0 10e3/uL    RBC Count 4.99 3.80 - 5.20 10e6/uL    Hemoglobin 14.7 11.7 - 15.7 g/dL    Hematocrit 44.7 35.0 - 47.0 %    MCV 90 78 - 100 fL    MCH 29.5 26.5 - 33.0 pg    MCHC 32.9 31.5 - 36.5 g/dL    RDW 14.0 10.0 - 15.0 %    Platelet Count 270 150 - 450 10e3/uL    % Neutrophils 75 %    % Lymphocytes 21 %    % Monocytes 4 %    % Eosinophils 0 %    % Basophils 0 %    % Immature Granulocytes 0 %    NRBCs per 100 WBC 0 <1 /100    Absolute Neutrophils 5.7 1.6 - 8.3 10e3/uL    Absolute Lymphocytes 1.6 0.8 - 5.3 10e3/uL    Absolute  Monocytes 0.3 0.0 - 1.3 10e3/uL    Absolute Eosinophils 0.0 0.0 - 0.7 10e3/uL    Absolute Basophils 0.0 0.0 - 0.2 10e3/uL    Absolute Immature Granulocytes 0.0 <=0.4 10e3/uL    Absolute NRBCs 0.0 10e3/uL   [place] Draw     Status: None    Collection Time: 12/08/22  1:32 PM    Narrative    The following orders were created for panel order [place] Draw.  Procedure                               Abnormality         Status                     ---------                               -----------         ------                     Extra Green Top (Lithium...[[id number]]                      Final result                 Please view results for these tests on the individual orders.   Extra Green Top (Lithium Heparin) ON ICE     Status: None    Collection Time: 12/08/22  1:32 PM   Result Value Ref Range    Hold Specimen [facility]    [place] Draw     Status: None    Collection Time: 12/08/22  1:33 PM    Narrative    The following orders were created for panel order [place] Draw.  Procedure                               Abnormality         Status                     ---------                               -----------         ------                     Extra Blue Top Tube[[id number]]                              Final result                 Please view results for these tests on the individual orders.   Extra Blue Top Tube     Status: None    Collection Time: 12/08/22  1:33 PM   Result Value Ref Range    Hold Specimen [facility]    Urine Drugs of Abuse Screen     Status: Abnormal    Collection Time: 12/08/22  1:47 PM    Narrative    The following orders were created for panel order Urine Drugs of Abuse Screen.  Procedure                               Abnormality         Status                     ---------                               -----------         ------                     Drug abuse screen 77 uri...[[id number]]  Abnormal            Final result                 Please view results for these tests on the individual orders.   Drug abuse  screen 77 urine (FL, RH, SH)     Status: Abnormal    Collection Time: 12/08/22  1:47 PM   Result Value Ref Range    Amphetamines Urine Screen Negative Screen Negative    Barbituates Urine Screen Negative Screen Negative    Benzodiazepine Urine Screen Negative Screen Negative    Cannabinoids Urine Screen Positive (A) Screen Negative    Opiates Urine Screen Negative Screen Negative    PCP Urine Screen Negative Screen Negative    Cocaine Urine Screen Negative Screen Negative   Magnesium     Status: Normal    Collection Time: 12/08/22  5:04 PM   Result Value Ref Range    Magnesium 2.2 1.7 - 2.3 mg/dL   Asymptomatic COVID-19 Virus (Coronavirus) by PCR Nasopharyngeal     Status: Normal    Collection Time: 12/08/22  7:07 PM    Specimen: Nasopharyngeal; Swab   Result Value Ref Range    SARS CoV2 PCR Negative Negative    Narrative    Testing was performed using the Xpert Xpress SARS-CoV-2 Assay on the Cepheid Gene-Xpert Instrument Systems. Additional information about this Emergency Use Authorization (EUA) assay can be found via the Lab Guide. This test should be ordered for the detection of SARS-CoV-2 in individuals who meet SARS-CoV-2 clinical and/or epidemiological criteria as well as from individuals without symptoms or other reasons to suspect COVID-19. Test performance for asymptomatic patients has only been established in anterior nasal swab specimens. This test is for in vitro diagnostic use under the FDA EUA for laboratories certified under CLIA to perform high complexity testing. This test has not been FDA cleared or approved. A negative result does not rule out the presence of PCR inhibitors in the specimen or target RNA concentration below the limit of detection for the assay. The possibility of a false negative should be considered if the patient's recent exposure or clinical presentation suggests COVID-19.. This test was validated by the [facility]. This laboratory is  "certified under the Clinical Laboratory Improvement Amendments (CLIA) as qualified to perform high complexity laboratory testing.           ED Meds:  Medications   sodium chloride 0.9% infusion ( Intravenous Rate/Dose Verify 12/8/22 1707)   0.9% sodium chloride BOLUS (0 mLs Intravenous Stopped 12/8/22 1500)   magnesium sulfate 2 g in water intermittent infusion (0 g Intravenous Stopped 12/8/22 1430)   potassium chloride ER (KLOR-CON M) CR tablet 40 mEq (40 mEq Oral Given 12/8/22 1651)   potassium chloride 10 mEq in 100 mL sterile water infusion (0 mEq Intravenous Stopped 12/8/22 2040)   ondansetron (ZOFRAN) injection 4 mg (4 mg Intravenous Given 12/8/22 1922)     No orders to display       ED Course:  ED Course as of 12/08/22 2144   Thu Dec 08, 2022   1340 Case discussed with Poison Control Center.  Agreed with laboratory work-up and treatment as ordered.  Recommend repeat EKG after magnesium to see if QT has shortened.   1407 Alcohol ethyl(!): 0.17   1512 Cannabinoids Qual Urine(!): Screen Positive   1521 Pt is medically cleared    1814 Spoke w DEC , Damaris, who spoke w pt/pt's daughter. Pt states impulsive and wouldn't have done this unless she was drinking. Daughter states hx of SI but never acted on them. Last night expressed SI to daughter and stated she would never act on it, but then OD'ed when daughter left for work. Daughter told pt she was happy she was alive, pt stated to daughter \"I guess I should be happy too.\"         There were no significant events during my shift.    Patient was signed out to the oncoming provider, Dr. Roopa Deras at shift change    Impression:    ICD-10-CM    1. Overdose, intentional self-harm, initial encounter (H)  T50.902A       2. Suicide attempt (H)  T14.91XA       3. Alcoholic intoxication without complication (H)  F10.920           Plan:    1. Awaiting inpatient mental health admission/transfer.      I, Gilmer Nunez, am serving as a scribe to document " services personally performed by Spenser Walden MD based on my observations and the provider's statements to me.  I, Spenser Walden MD, attest that Gilmer Nunez is acting in a scribe capacity, has observed my performance of the services and has documented them in accordance with my direction.      Spenser Walden MD  [facility] EMERGENCY DEPARTMENT  [address] [place] [id number]  [phone]                 Spenser Walden MD  12/09/22 2603

## 2022-12-09 NOTE — ED NOTES
--------------  DISCHARGE REVIEW    Payor: Sara Castillo #:  [id number]  Authorization Number: Pend    Admit date: 3/19/19  Admit time:  1812  Discharge Date: 3/22/2019  2:48 PM     Admitting Physician: Kp Garcia MD  Attending Physician:  Connie smyth [facility] ED Mental Health Handoff Note:     Assuming care from: Dr Roopa Deras    Brief HPI: 52 year old female signed out to me by the above provider. See initial ED Provider note for full details of the presentation.   In brief, patient made suicidal statement to her daughter 12/7 and when the patient's daughter called the patient 12/8 there was no answer, so the patient's daughter called EMS for a welfare check. EMS found the patient unresponsive in bed with green emesis and an empty bottle of fireball next to the bed. Daughter reported patient takes antidepressants that are a green color. The patient has been to the ED before for suicidal ideations.      Home meds reviewed and ordered/administered: Yes  Medically stable for inpatient mental health admission: Yes.  Evaluated by mental health: Yes. The recommendation is for inpatient mental health treatment. Bed search in process  Safety concerns: At the time I received sign out, there were no safety concerns.    Hold Status:  Active Orders   N/A         Labs/Imaging:   Results for orders placed or performed during the hospital encounter of 12/08/22 (from the past 24 hour(s))   CBC with platelets differential     Status: None    Collection Time: 12/08/22  1:32 PM    Narrative    The following orders were created for panel order CBC with platelets differential.  Procedure                               Abnormality         Status                     ---------                               -----------         ------                     CBC with platelets and d...[[id number]]                      Final result                 Please view results for these tests on the individual orders.   Comprehensive metabolic panel     Status: Abnormal    Collection Time: 12/08/22  1:32 PM   Result Value Ref Range    Sodium 142 136 - 145 mmol/L    Potassium 3.5 3.4 - 5.3 mmol/L    Chloride 107 98 - 107 mmol/L    Carbon Dioxide (CO2) 22 22 - 29 mmol/L    Anion Gap 13 7 -  15 mmol/L    Urea Nitrogen 6.3 6.0 - 20.0 mg/dL    Creatinine 0.82 0.51 - 0.95 mg/dL    Calcium 8.1 (L) 8.6 - 10.0 mg/dL    Glucose 118 (H) 70 - 99 mg/dL    Alkaline Phosphatase 54 35 - 104 U/L    AST 24 10 - 35 U/L    ALT 22 10 - 35 U/L    Protein Total 6.9 6.4 - 8.3 g/dL    Albumin 4.3 3.5 - 5.2 g/dL    Bilirubin Total 0.2 <=1.2 mg/dL    GFR Estimate 87 >60 mL/min/1.73m2   CK total     Status: Normal    Collection Time: 12/08/22  1:32 PM   Result Value Ref Range     26 - 192 U/L   Alcohol ethyl     Status: Abnormal    Collection Time: 12/08/22  1:32 PM   Result Value Ref Range    Alcohol ethyl 0.17 (H) <=0.01 g/dL   Salicylate level     Status: Normal    Collection Time: 12/08/22  1:32 PM   Result Value Ref Range    Salicylate <0.5   mg/dL   Acetaminophen level     Status: Abnormal    Collection Time: 12/08/22  1:32 PM   Result Value Ref Range    Acetaminophen <5.0 (L) 10.0 - 30.0 ug/mL   CBC with platelets and differential     Status: None    Collection Time: 12/08/22  1:32 PM   Result Value Ref Range    WBC Count 7.6 4.0 - 11.0 10e3/uL    RBC Count 4.99 3.80 - 5.20 10e6/uL    Hemoglobin 14.7 11.7 - 15.7 g/dL    Hematocrit 44.7 35.0 - 47.0 %    MCV 90 78 - 100 fL    MCH 29.5 26.5 - 33.0 pg    MCHC 32.9 31.5 - 36.5 g/dL    RDW 14.0 10.0 - 15.0 %    Platelet Count 270 150 - 450 10e3/uL    % Neutrophils 75 %    % Lymphocytes 21 %    % Monocytes 4 %    % Eosinophils 0 %    % Basophils 0 %    % Immature Granulocytes 0 %    NRBCs per 100 WBC 0 <1 /100    Absolute Neutrophils 5.7 1.6 - 8.3 10e3/uL    Absolute Lymphocytes 1.6 0.8 - 5.3 10e3/uL    Absolute Monocytes 0.3 0.0 - 1.3 10e3/uL    Absolute Eosinophils 0.0 0.0 - 0.7 10e3/uL    Absolute Basophils 0.0 0.0 - 0.2 10e3/uL    Absolute Immature Granulocytes 0.0 <=0.4 10e3/uL    Absolute NRBCs 0.0 10e3/uL   [place] Draw     Status: None    Collection Time: 12/08/22  1:32 PM    Narrative    The following orders were created for panel order [place] Draw.  Procedure    flu swab was performed that was negative. Parents were instructed to give supportive care. On 3/18 patient started to have increased work of breathing and respiratory rate.  She also developed worsened nasal congestion.      On the day of admission patient                             Abnormality         Status                     ---------                               -----------         ------                     Extra Green Top (Lithium...[[id number]]                      Final result                 Please view results for these tests on the individual orders.   Extra Green Top (Lithium Heparin) ON ICE     Status: None    Collection Time: 12/08/22  1:32 PM   Result Value Ref Range    Hold Specimen [facility]    [place] Draw     Status: None    Collection Time: 12/08/22  1:33 PM    Narrative    The following orders were created for panel order [place] Draw.  Procedure                               Abnormality         Status                     ---------                               -----------         ------                     Extra Blue Top Tube[[id number]]                              Final result                 Please view results for these tests on the individual orders.   Extra Blue Top Tube     Status: None    Collection Time: 12/08/22  1:33 PM   Result Value Ref Range    Hold Specimen [facility]    Urine Drugs of Abuse Screen     Status: Abnormal    Collection Time: 12/08/22  1:47 PM    Narrative    The following orders were created for panel order Urine Drugs of Abuse Screen.  Procedure                               Abnormality         Status                     ---------                               -----------         ------                     Drug abuse screen 77 uri...[[id number]]  Abnormal            Final result                 Please view results for these tests on the individual orders.   Drug abuse screen 77 urine (FL, RH, SH)     Status: Abnormal    Collection Time: 12/08/22  1:47 PM   Result Value Ref Range    Amphetamines Urine Screen Negative Screen Negative    Barbituates Urine Screen Negative Screen Negative    Benzodiazepine Urine Screen Negative Screen Negative    Cannabinoids Urine Screen Positive (A) Screen Negative    Opiates Urine Screen Negative  for further management        Hospital Course:   Pt was admitted to the PICU and while PICU status was comanaged with the ped intensivist. Pt was admitted and placed on 10mg continuos albuterol which gradually was weaned and PTD pt receiving albuterol PRN. Screen Negative    PCP Urine Screen Negative Screen Negative    Cocaine Urine Screen Negative Screen Negative   Magnesium     Status: Normal    Collection Time: 12/08/22  5:04 PM   Result Value Ref Range    Magnesium 2.2 1.7 - 2.3 mg/dL   Asymptomatic COVID-19 Virus (Coronavirus) by PCR Nasopharyngeal     Status: Normal    Collection Time: 12/08/22  7:07 PM    Specimen: Nasopharyngeal; Swab   Result Value Ref Range    SARS CoV2 PCR Negative Negative    Narrative    Testing was performed using the Xpert Xpress SARS-CoV-2 Assay on the Cepheid Gene-Xpert Instrument Systems. Additional information about this Emergency Use Authorization (EUA) assay can be found via the Lab Guide. This test should be ordered for the detection of SARS-CoV-2 in individuals who meet SARS-CoV-2 clinical and/or epidemiological criteria as well as from individuals without symptoms or other reasons to suspect COVID-19. Test performance for asymptomatic patients has only been established in anterior nasal swab specimens. This test is for in vitro diagnostic use under the FDA EUA for laboratories certified under CLIA to perform high complexity testing. This test has not been FDA cleared or approved. A negative result does not rule out the presence of PCR inhibitors in the specimen or target RNA concentration below the limit of detection for the assay. The possibility of a false negative should be considered if the patient's recent exposure or clinical presentation suggests COVID-19.. This test was validated by the [facility]. This laboratory is certified under the Clinical Laboratory Improvement Amendments (CLIA) as qualified to perform high complexity laboratory testing.           ED Meds:  Medications   sodium chloride 0.9% infusion ( Intravenous New Bag 12/9/22 0055)   0.9% sodium chloride BOLUS (0 mLs Intravenous Stopped 12/8/22 1500)   magnesium sulfate 2 g in water intermittent infusion (0 g Intravenous  bowel sounds, no hepatosplenomegaly, no rebound, no guarding. Extremities:  No cyanosis, edema, clubbing, capillary refill less than 3 seconds. Neuro:   No focal deficits.     Significant Labs:   Results for orders placed or performed during the hospital 105 mm Hg    ABG HCO3 22.6 22.0 - 26.0 mEq/L    ABG Base Excess -3.4 (L) -2.0 - 2.0 mmol/L    ABG O2 Saturation 95 92 - 100 %    Calculated O2 Saturation 95 92 - 100 %    Patient Temperature 100.5 F    Total Hemoglobin 9.0 (L) 11.1 - 14.1 g/dL    Carboxyhe Stopped 12/8/22 1430)   potassium chloride ER (KLOR-CON M) CR tablet 40 mEq (40 mEq Oral Given 12/8/22 1651)   potassium chloride 10 mEq in 100 mL sterile water infusion (0 mEq Intravenous Stopped 12/8/22 2040)   ondansetron (ZOFRAN) injection 4 mg (4 mg Intravenous Given 12/8/22 1922)   acetaminophen (TYLENOL) tablet 650 mg (650 mg Oral Given 12/9/22 0003)     No orders to display       ED Course:    There were no significant events during my shift.    Patient was signed out to the oncoming provider, Dr. Arriola at shift change    Impression:    ICD-10-CM    1. Overdose, intentional self-harm, initial encounter (H)  T50.902A       2. Suicide attempt (H)  T14.91XA       3. Alcoholic intoxication without complication (H)  F10.920           Plan:    1. Boarding in ED awaiting inpatient psych placement        Dean Langston MD  [facility] EMERGENCY DEPARTMENT  [address] [place] [id number] [phone]                   Dean Langston MD  12/09/22 7128     PCR: Negative Negative    Coronavirus 229E PCR: Negative Negative    Coronavirus Hku1 PCR: Negative Negative    Coronavirus Nl63 PCR: Negative Negative    Coronavirus Oc43 PCR: Negative Negative    Metapneumovirus PCR: Negative Negative    Rhinovirus/Enter lung base measuring up to 1.7 cm. CXR:  =====  CONCLUSION:    Right upper lobe pneumonia appears more dense, larger. Measures about 4.1 cm, previously 3.4 cm. Left suprahilar pneumonia suspected, probably not markedly changed in size.   The could

## 2022-12-09 NOTE — CONSULTS
"Diagnostic Evaluation Consultation  Crisis Assessment    Patient was assessed: [place]  Patient location: [place]  Was a release of information signed: Yes. Providers included on the release: sent blank      Referral Data and Chief Complaint  Holly is a 52 year old, who uses she/her pronouns, and presents to the ED via EMS. Patient is referred to the ED by other: EMS. Patient is presenting to the ED for the following concerns: Patient attempted suicide by ingestion after relapsing on alcohol.  Patient reports she took a handful of Zoloft and a months worth of Hydroxyzine.     Informed Consent and Assessment Methods     Patient is her own guardian. Writer met with patient and explained the crisis assessment process, including applicable information disclosures and limits to confidentiality, assessed understanding of the process, and obtained consent to proceed with the assessment. Patient was observed to be able to participate in the assessment as evidenced by patient was alert and actively participated . Assessment methods included conducting a formal interview with patient, review of medical records, collaboration with medical staff, and obtaining relevant collateral information from family and community providers when available..     Over the course of this crisis assessment provided reassurance, offered validation, engaged patient in problem solving and disposition planning, worked with patient on safety and aftercare planning and worked with patient on brief, therapeutic activity: supportive listening. Patient's response to interventions was positive     Summary of Patient Situation       Holly was alert, cooperative and calm.   She reports she is a , her   in 2022.  She reports she had one incidence of substance use/depression since that time and was evaluated in ED in 2022 and discharged with outpatient services.  Patient reports she has been doing \"good\" since that time.   She " "reports she has not drank alcohol since August until last night.   Holly reports a stressor has been that her husbands family and friends have \"ignored\" her since his death.   She reports yesterday she saw a mutual friend and he told her that they are ignoring her because they believe she  her  for money.  She reports this triggered her.  She reports on her way home she stopped at the liquor store.  The patient reports she drank and then took a \"handful of Zoloft and a month's worth of vistorol\".         Brief Psychosocial History    - Holly lives in an apartment with her 19 year old daughter, Mehnaz.   She was  in 2022 and and her   1 month later.  She reports his family has rejected her.  She reports she has a good support system from [facility] and recovery.  She has been unemployed for 2 years due to chronic pain.       Significant Clinical History       Holly reports a diagnosis of depression, anxiety and alcohol abuse.   She completed treatment at [facility] two years and maintained sobriety until her   in July.  She has been sober for 4 months until today.    She reports one hospitalization at [facility] 2 years ago.  She denies a history of suicide attempts.        Collateral Information    ..The following information was received from Jermaine whose relationship to the patient is daughter. Information was obtained via phone. Their phone number is [phone] and they last had contact with patient on early this morning.    What happened today: The patient reports when she returned ubaldo from work last night, her mom was \"off\".  She gave her space but later went into her bedroom and found her mom crying.  She reports her mother told her she was suicidal but after they talked, mom reported she would not kill herself and they both went to bed.  Patient woke up really early to work out and go to work.  She reports her mom texted her at 3 am to see if she went to the gym.  " "When Mehnaz called and texted mom later in the morning she had no response.  She report her mother still had not responded at noon so she called  who went to apartment and found mom lying in green throw up.      What is different about patient's functioning: She reports her mother has had suicidal thoughts in past but has never acted on them.     Concern about alcohol/drug use: Yes Mom only has suicidal thoughts when drinking    What do you think the patient needs: inpatient mental health    Has patient made comments about wanting to kill themselves/others:  Yes last night and today she said \"I guess I should be glad I didn't die\"     If d/c is recommended, can they take part in safety/aftercare planning: Yes but fearful as she works alot    Other information:          Risk Assessment  [place] Suicide Severity Rating Scale Full Clinical Version:  Suicidal Ideation  1. Wish to be Dead (Lifetime): Yes  1. Wish to be Dead (Past 1 Month): Yes  2. Non-Specific Active Suicidal Thoughts (Lifetime): Yes  2. Non-Specific Active Suicidal Thoughts (Past 1 Month): Yes  3. Active Suicidal Ideation with any Methods (Not Plan) Without Intent to Act (Lifetime): No  3. Active Suicidal Ideation with any Methods (Not Plan) Without Intent to Act (Past 1 Month): No  4. Active Suicidal Ideation with Some Intent to Act, Without Specific Plan (Lifetime): No  4. Active Suicidal Ideation with Some Intent to Act, Without Specific Plan (Past 1 Month): No  5. Active Suicidal Ideation with Specific Plan and Intent (Lifetime): Yes  5. Active Suicidal Ideation with Specific Plan and Intent (Past 1 Month): Yes  Intensity of Ideation  Most Severe Ideation Rating (Lifetime): 1  Most Severe Ideation Rating (Past 1 Month): 1  Frequency (Lifetime): Less than once a week  Frequency (Past 1 Month): Less than once a week  Duration (Lifetime): Fleeting, few seconds or minutes  Duration (Past 1 Month): Fleeting, few seconds or " minutes  Controllability (Lifetime): Easily able to control thoughts  Controllability (Past 1 Month): Unable to control thoughts  Deterrents (Lifetime): Deterrents definitely stopped you from attempting suicide  Deterrents (Past 1 Month): Deterrents definitely did not stop you  Reasons for Ideation (Lifetime): Mostly to end or stop the pain (You couldn't go on living with the pain or how you were feeling)  Reasons for Ideation (Past 1 Month): Mostly to end or stop the pain (You couldn't go on living with the pain or how you were feeling)  Suicidal Behavior  Actual Attempt (Lifetime): Yes  Total Number of Actual Attempts (Lifetime): 1  Actual Attempt (Past 3 Months): Yes  Total Number of Actual Attempts (Past 3 Months): 1  Has subject engaged in non-suicidal self-injurious behavior? (Lifetime): No  Interrupted Attempts (Lifetime): No  Aborted or Self-Interrupted Attempt (Lifetime): No  Preparatory Acts or Behavior (Lifetime): No  C-SSRS Risk (Lifetime/Recent)  Calculated C-SSRS Risk Score (Lifetime/Recent): High Risk    [place] Suicide Severity Rating Scale Since Last Contact:         Actual/Potential Lethality (Most Lethal Attempt)  Most Lethal Attempt Date: 12/08/22  Actual Lethality/Medical Damage Code (Most Lethal Attempt): Moderate physical damage, medical attention needed  Potential Lethality Code (Most Lethal Attempt): Behavior likely to result in injury but not likely to cause death       Validity of evaluation is not impacted by presenting factors during interview.   Comments regarding subjective versus objective responses to [place] tool: Patient reports she attempted suicide by ingestion  Environmental or Psychosocial Events: loss of a loved one, public humiliation, helplessness/hopelessness, impulsivity/recklessness, unemployment/underemployment, excessive debt, poor finances, ongoing abuse of substances and neither working nor attending school  Chronic Risk Factors: history of psychiatric  hospitalization and chronic health problems   Warning Signs: talking or writing about death, dying, or suicide, increasing substance use or abuse and no reason for living, no sense of purpose in life  Protective Factors: strong bond to family unit, community support, or employment, lives in a responsibly safe and stable environment, good treatment engagement, able to access care without barriers, sense of belonging, cultural, spiritual , or Confucianist beliefs associated with meaning and value in life and constructive use of leisure time, enjoyable activities, resilience  Interpretation of Risk Scoring, Risk Mitigation Interventions and Safety Plan:  Patient is high risk based on recent suicide attempt.        Does the patient have thoughts of harming others? No     Is the patient engaging in sexually inappropriate behavior?  no        Current Substance Abuse     Is there recent substance abuse? Substance type(s): alcohol  Frequency: when relapse Quantity: unsure Method: oral Duration:  1 day Last use: today     Was a urine drug screen or blood alcohol level obtained: Yes positive for alcohol and THC       Mental Status Exam     Affect: Appropriate   Appearance: Appropriate    Attention Span/Concentration: Attentive  Eye Contact: Engaged   Fund of Knowledge: Appropriate    Language /Speech Content: Fluent   Language /Speech Volume: Normal    Language /Speech Rate/Productions: Normal    Recent Memory: Intact   Remote Memory: Intact   Mood: Anxious    Orientation to Person: Yes    Orientation to Place: Yes   Orientation to Time of Day: Yes    Orientation to Date: Yes    Situation (Do they understand why they are here?): Yes    Psychomotor Behavior: Normal    Thought Content: Suicidal   Thought Form: Intact      History of commitment: No           Medication    Psychotropic medications: Yes. Pt is currently taking zoloft hydroxozine. Medication compliant: Yes. Recent medication changes: No  Medication changes made in the  last two weeks: No       Current Care Team    Primary Care Provider: Yes. Name: Dr. Jurado. Location: unknown. Date of last visit: unknown. Frequency: unknown. Perceived helpfulness: helpful.  Psychiatrist: No  Therapist: Yes. Name: David Morrow. Location: [facility] [place]. Date of last visit: 1 week ago. Frequency: weekly. Perceived helpfulness: helpful.  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  Her   in 2022 and she has struggled with increase of depression and grief since that time.   It is recommended the patient be admitted for further evaluation     Admission to Inpatient Level of Care is indicated due to:    1. Patient risk of severity of behavioral health disorder is appropriate to proposed level of care as indicated by:    Imminent Risk of Harm: Very Recent suicide attempt or deliberate act of serious harm to self WITHOUT relief of factors precipitating the attempt or act  And/or:  Behavioral health disorder is present and appropriate for inpatient care with both of the following:     Severe psychiatric, behavioral or other comorbid conditions are appropriate for management at inpatient mental health as indicated by at least one of the following:   o Comorbid substance use disorder    Severe dysfunction in daily living is present as indicated by at least one of the following:   o     2. Inpatient mental health services are necessary to meet patient needs and at least one of the following:  Specific condition related to admission diagnosis is present and judged likely to  further improve at proposed level of care    3. Situation and expectations are appropriate for inpatient care, as indicated by one of the following:   Voluntary treatment at lower level of care is not feasible    Disposition    Recommended disposition: Inpatient Mental Health       Reviewed case and recommendations with attending provider. Attending Name: Dr Caballero       Attending concurs with disposition: Yes       Patient concurs with disposition: No: patient on hold       Guardian concurs with disposition: NA      Final disposition: Inpatient mental health .     Inpatient Details (if applicable):   Is patient admitted voluntarily:patient on hold      Patient aware of potential for transfer if there is not appropriate placement? Yes       Patient is willing to travel outside of the [facility] for placement? No      Behavioral Intake Notified? Yes: .       Assessment Details    Patient interview started at: 5:41PM and completed at: 5:59.     Total duration spent on the patient case in minutes: .25     CPT code(s) utilized: 98631 - Psychotherapy for Crisis - 60 (30-74*) min       IDALMIS Chakraborty, GINGER, IDALMIS, Psychotherapist  [facility] - Triage & Transition Services  Callback: [phone]

## 2022-12-09 NOTE — ED NOTES
"Triage & Transition Services, Extended Care     Therapy Progress Note    Patient: Ghazal goes by \"Ghazal,\" uses she/her pronouns  Date of Service: 2022  Site of Service: [facility] ED  Patient was seen virtually ([employer] cart or other teleconferencing device).     Presenting problem:   Ghazal is followed related to Long wait time for admission: 20+ hours in the ED. Please see initial DEC/[facility] Crisis Assessment completed by Damaris Rodriguez on 22 for complete assessment information. Notable concerns include suicide attempt     Individuals Present: Ghazal & Madalyn Galvan, patient's daughter Mehnaz    Session start: 10:28 am  Session end: 10:52am  Session duration in minutes: 24 minutes  Session number: 1  Anticipated number of sessions or this episode of care: 2-4  CPT utilized: 92886 - Psychotherapy (with patient) - 30 (16-37*) min    Current Presentation:   Patient's daughter was in the room during telehealth visit.  Patient wanted her to stay.  Patient is calm and cooperative.  She reports working on getting the puke out of her hair.  She reports being given the opportunity to clean up.  Patient appears dismissive of the seriousness of her suicide attempt and states it was impulsive.  She states she verbalizes inpatient being what she needs to do though she would rather go home.  Patient shares a mutual friend told her that her  's close friend is telling everyone that she only  her  for his money.  She reports his family won't engage with her and have hurt her more than her 's passing.  She reports she met her  in February, they  in  and he  in July.  She states she is learning that he did not share how ill he was and he just wanted to  her.  Patient states it is coming to a point that he has been gone from her life almost as long as he was in her life.  She reports hearing what was being said about her was too much and she was " overwhelmed.  Discussed that words only have the power we give them.  Patient states she does not feel that she needs substance abuse treatment.  Discussed needing to work on coping skills to manage when feeling overwhelmed and prevent impulsive suicide attempts.  Provided patient with an update on placement and answered questions.       Mental Status Exam:   Appearance: awake, alert  Attitude: cooperative  Eye Contact: good  Mood: depressed  Affect: appropriate and in normal range and mood congruent  Speech: clear, coherent  Psychomotor Behavior: no evidence of tardive dyskinesia, dystonia, or tics  Thought Process:  logical  Associations: no loose associations  Thought Content: identifying things to live for  Insight: fair  Judgement: fair  Oriented to: time, person, and place  Attention Span and Concentration: intact  Recent and Remote Memory: intact    Diagnosis:   296.31 (F33.0) Major Depressive Disorder, Recurrent Episode, Mild _ and With anxious distress  300.02 (F41.1) Generalized Anxiety Disorder  Substance-Related & Addictive Disorders Alcohol Use Disorder   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    Treatment Objective(s) Addressed:   The focus of this session was on rapport building, orienting the patient to therapy and assessing safety.     Progress Towards Goals:   Patient reports stable symptoms. She appears disconnected to the seriousness of her attempt.    General Recommendations:   Continue to monitor for harm. Consider: Use a positive, direct and calm approach. Pt's tend to match the energy/mood of the staff. Keep focus positive and upbeat, Provide the pt with options to provide a sense of control. Try to tell the pt what they can do instead of what they can't do and Allow family calls/visits    Plan:   Inpatient Mental Health: Plan for inpatient admission.  Patient is voluntary at  this time.  Patient could be put on a hold if needed    Plan for Care reviewed with Assigned Medical Provider? Yes. Provider, Kian, response: agreement     Madalyn Galvan, [facility]   Licensed Mental Health Professional (LMHP), [facility]  [phone]

## 2022-12-09 NOTE — TELEPHONE ENCOUNTER
10:14 AM Intake called [facility] and spoke to charge RN, bed is available and patient can be reviewed. Intake provided information for patient and updated coordinator working on case to fax information to [facility].

## 2022-12-09 NOTE — TELEPHONE ENCOUNTER
S: [facility] ED , DEC    calling at 6:36PM about a 52 year old/Female presenting with SA by pill ingestion     B: Pt arrived via EMS. Presenting problem, stressors: SA by ingestion of Prozac, and a months worth of vistaril. Pt   in July from alcohol abuse. Pt reports that she met her  in ETOH treatment, and after his death in July she drank for a month then got sober in august and has since been sober. Pt reports SI and ETOH stressors and triggers of Pt  family thinking she was just with him for his money. Pt went and got alcohol last night to drink with suicidal thoughts and intentions but spoke with her 18yo daughter who intervened and helped Pt regulate enough to sleep, but then Pt became suicidal again and ingested the pills in a suicide attempt. Pt daughter says this is very different than her mother's usual baseline and she is very concerned for Pt safety.     Pt endorses SI with a plan to overdose, did complete an ingestion in an act of suicide, poison control and ED provider have medically cleared Pt of this ingestion.   Pt denies SIB  Pt denies HI   Pt denies hallucinations .     Pt affect in ED: unconcerned about her condition, dismissive  Pt Dx: Major Depressive Disorder, Generalized Anxiety Disorder and Substance Use Disorder: ETOH  Previous IPMH hx? No  Hx of suicide attempt? No    Hx of aggression/violence, sexual offences, legal concerns, or Epic care plan? describe: No    Current concerns for aggression this visit? No    Pt is prescribed medication.   Is patient medication compliant? Yes  Pt endorses OP services: Therapist and weekly AA fellowship  CD concerns: Pt is in recovery with a hx of ETOH use . Also using THC recreationally.   Acute or chronic medical concerns: Pt has chronic muscle tension    Does Pt present with specific needs, assistive devices, or exclusionary criteria? None    Does pt have a history of Civil Commitment? No  Is Pt their own guardian?  Yes  Pt is ambulatory  Pt is able to perform ADLs independently      A: Pt meets criteria for review for IP admission. Pt is on a 72HH, initiated (unknown)  Preferred placement: [facility]    COVID:Ordered, not yet collected  Utox: Ordered, not yet collected   CMP: N/A  CBC: N/A  HCG: Ordered, not yet collected    R: Patient cleared and ready for behavioral bed placement: Yes  Pt placed on IP worklist? Yes

## 2022-12-09 NOTE — TELEPHONE ENCOUNTER
R:  [place]  Access Inpatient Bed Call Log 12/9/2022 8:04 AM      Adults:             [facility] is posting 0 beds.                [facility] is posting 0 beds.              [facility] is posting 0 beds.               [facility] is posting 0 beds.              [facility] is posting 0 beds.              [facility] is posting 0 beds.              [facility] is posting 0 beds.              is posting 0 beds.                    [facility] is posting 1 beds. Mixed unit 12+. Low acuity only             [facility] is positing 0 beds. No aggression.               [facility] is posting 0 beds.               [facility] is posting 0 beds. Low acuity only.             [facility] is posting 0 beds.              [facility] is posting 1 beds. Low acuity             [facility] is posting 2 beds. 72 hr hold required.               [facility] is posting 5 beds.              [facility] is posting 0 beds. Vol only, No Hx of aggression, violence, or assault. No sexual offenders. No 72 hr holds.                    [facility] is posting 8 beds. (Must have the cognitive ability to do programming. No aggressive or violent behavior or recent HX in the last 2 yrs. MH must be primary.)              [facility] is posting 0 beds. Low acuity only. Violence and aggression capped.       [facility] is posting 1 beds. Low acuity, Neg Covid.               [facility] is posting 0 beds. Covid neg. Vol only. Combined adolescent and adult unit. No aggressive or violent behavior. No registered sex offenders.             [facility] is posting 4 beds. Call for details. 8:18a Emilie beds available.              [facility] is posting 3 beds.      Pt will remain on the worklist until an appropriate [facility] bed is available.     10:54 AM Clinical faxed to [facility] for review    12:36 PM [facility]  updated that they hadn't received the clinical fax.

## 2022-12-09 NOTE — TELEPHONE ENCOUNTER
1301 This writer called to check on pt's legal status.    Pt's RN Sharon was able to confirm pt is still voluntary but is holdable.  RN asked pt if she would be willing to go to [place] but pt does not want admission there because of the distance.  Intake will notify ED if another facility in the [facility] or surrounding areas is willing to review.    0131 Bed Search Update:    [facility]-0052 Thais reporting they are at capacity with pts waiting in the ER.  [facility]-0054 Eileen reporting they are at capacity for all [facility] locations ([place], [facility], [facility], [place], [place], [facility]).  Check back after 1000 for bed availability.  [facility]-0101 Clarice reporting pt not appropriate for available bed.  [facility]-0111 Edward[facility] reporting no beds available  [facility] reporting they only accept committed pts and there is a wait list of over 100 waiting to get in.  [facility] ([place], [place], [place])-Per evening shift hand off, pt was declined for review due to it being less than 24 hrs post-ingestion but may be able to review 12/9 (mid-morning)      Remains on wait list.

## 2022-12-09 NOTE — PHARMACY-ADMISSION MEDICATION HISTORY
"Pharmacy Note - Admission Medication History    Pertinent Provider Information: none     ______________________________________________________________________    Prior To Admission (PTA) med list completed and updated in EMR.       PTA Med List   Medication Sig Note Last Dose     acetaminophen (TYLENOL) 500 MG tablet Take 500-1,000 mg by mouth every 6 hours as needed  Unknown     atorvastatin (LIPITOR) 10 MG tablet Take 10 mg by mouth At Bedtime  Past Week     FLUoxetine (PROZAC) 20 MG capsule Take 20 mg by mouth daily  Past Week     fluticasone (FLONASE) 50 MCG/ACT nasal spray Spray 1 spray into both nostrils daily as needed  Past Week     hydrOXYzine (ATARAX) 10 MG tablet 1-2 tabs up to three time daily as needed for anxiety or insomnia.  May cause drowsiness.  Past Week     hydrOXYzine (VISTARIL) 25 MG capsule Take 25 mg by mouth 3 times daily as needed 12/9/2022: Has both 10mg and 25mg but tries not to take the 25mg as it \"knocks her out\" Unknown     ibuprofen (ADVIL/MOTRIN) 200 MG tablet Take 800 mg by mouth every 8 hours as needed for pain  Unknown     magnesium oxide 400 MG CAPS Take 400 mg by mouth 2 times daily  Past Week     Menaquinone-7 (VITAMIN K2 PO) Take 1 tablet by mouth daily  Past Week     methocarbamol (ROBAXIN) 750 MG tablet Take 750 mg by mouth 4 times daily as needed for muscle spasms  Unknown     naphazoline-pheniramine (OPCON-A) SOLN ophthalmic solution Place 1-2 drops into both eyes 2 times daily  Past Week     potassium 99 MG TABS Take 1 tablet by mouth daily OTC  Past Week     VITAMIN A PO Take 1 capsule by mouth daily  Past Week     vitamin C with B complex (B COMPLEX-C) tablet Take 1 tablet by mouth daily  Past Week     vitamin D3 (CHOLECALCIFEROL) 125 MCG (5000 UT) tablet Take 5,000 Units by mouth daily  Past Week     VITAMIN E PO Take 1 capsule by mouth daily  Past Week       Information source(s): Patient and CareEverywhere/SureScripts  Method of interview communication: " in-person    Summary of Changes to PTA Med List  New: atorvastatin, methocarbamol, magnesium, potassium, ee drops, vitamin A, E, K2, ibuprofen  Discontinued: none  Changed: added directions    Patient was asked about OTC/herbal products specifically.  PTA med list reflects this.    Allergies were reviewed, assessed, and updated with the patient.      Patient does not use any multi-dose medications prior to admission.    The information provided in this note is only as accurate as the sources available at the time of the update(s).    Thank you for the opportunity to participate in the care of this patient.    Annetta Nur RPH  12/9/2022 3:26 PM

## 2022-12-09 NOTE — ED NOTES
[facility] ED Mental Health Handoff Note:       NAME: Holly Briseno  AGE: 52 year old female  YOB: 1970  MRN: [id number]  EVALUATION DATE & TIME: 12/8/2022  1:18 PM    PCP: No Ref-Primary, Physician    ED PROVIDER: Damaris Arriola MD      Chief Complaint   Patient presents with     Altered Mental Status         FINAL IMPRESSION:  1. Overdose, intentional self-harm, initial encounter (H)    2. Suicide attempt (H)    3. Alcoholic intoxication without complication (H)            MEDICAL DECISION MAKING:    Holly Briseno is a 52 year old female who presents to the ER with alcohol consumption and possible overdose on medications including hydroxyzine, fluoxetine, methocarbamol. She is now medically cleared. Patient was evaluated by DEC and is currently awaiting inpatient psych placement.    Patient signed out at change of shift to my partner awaiting inpatient psych placement.      ED course and significant events during shift:    3:27 PM Patient was signed out to me by Dr. Dean Langston.    10:15 PM  Patient signed out at change of shift to overnight provider.  She is otherwise stable.  No events overnight.  Awaiting inpatient psych placement.    There were no significant events during my shift.      Meds Given While in the ER:  Medications   FLUoxetine (PROzac) capsule 20 mg (20 mg Oral Given 12/9/22 1623)   0.9% sodium chloride BOLUS (0 mLs Intravenous Stopped 12/8/22 1500)   magnesium sulfate 2 g in water intermittent infusion (0 g Intravenous Stopped 12/8/22 1430)   potassium chloride ER (KLOR-CON M) CR tablet 40 mEq (40 mEq Oral Given 12/8/22 1651)   potassium chloride 10 mEq in 100 mL sterile water infusion (0 mEq Intravenous Stopped 12/8/22 2040)   ondansetron (ZOFRAN) injection 4 mg (4 mg Intravenous Given 12/8/22 1922)   acetaminophen (TYLENOL) tablet 650 mg (650 mg Oral Given 12/9/22 0003)       Patient was signed out to the oncoming provider at shift change    Diagnosis:  1.  Overdose, intentional self-harm, initial encounter (H)    2. Suicide attempt (H)    3. Alcoholic intoxication without complication (H)          CONDITION:  stable    DISPOSITION:    1. Awaiting inpatient mental health admission/transfer.    ===================================================================    ===================================================================      Assuming care from: Dr Walt Langston      Brief HPI: 52 year old female signed out to me by the above provider. See initial ED Provider note for full details of the presentation.     In brief, patient initially presented with alcohol consumption and possible overdose on medications including hydroxyzine, fluoxetine, methocarbamol. She is now medically cleared. Patient was evaluated by DEC and is currently awaiting inpatient psych placement.      Past Medical History:  Past Medical History:   Diagnosis Date     Alcoholism (H)      Anxiety      Cancer (H) September 2021    Melanoma     Depressive disorder February 3 2021       Past Surgical History:  Past Surgical History:   Procedure Laterality Date     BIOPSY  September 2021    Melanoma         Home meds reviewed and ordered/administered: Yes  Medically stable for inpatient mental health admission: Yes.  Evaluated by mental health: Yes. The recommendation is for inpatient mental health treatment. Bed search in process  Safety concerns: At the time I received sign out, there were no safety concerns.    Hold Status:  Active Orders   N/A           Home Meds:  Prior to Admission medications    Medication Sig Start Date End Date Taking? Authorizing Provider   acetaminophen (TYLENOL) 500 MG tablet Take 500-1,000 mg by mouth every 6 hours as needed   Yes Reported, Patient   atorvastatin (LIPITOR) 10 MG tablet Take 10 mg by mouth At Bedtime   Yes Unknown, Entered By History   FLUoxetine (PROZAC) 20 MG capsule Take 20 mg by mouth daily 8/8/22  Yes Reported, Patient   fluticasone (FLONASE) 50  MCG/ACT nasal spray Spray 1 spray into both nostrils daily as needed 3/15/22  Yes Reported, Patient   hydrOXYzine (ATARAX) 10 MG tablet 1-2 tabs up to three time daily as needed for anxiety or insomnia.  May cause drowsiness. 8/8/22  Yes Reported, Patient   hydrOXYzine (VISTARIL) 25 MG capsule Take 25 mg by mouth 3 times daily as needed 8/8/22  Yes Reported, Patient   ibuprofen (ADVIL/MOTRIN) 200 MG tablet Take 800 mg by mouth every 8 hours as needed for pain   Yes Unknown, Entered By History   magnesium oxide 400 MG CAPS Take 400 mg by mouth 2 times daily   Yes Unknown, Entered By History   Menaquinone-7 (VITAMIN K2 PO) Take 1 tablet by mouth daily   Yes Unknown, Entered By History   methocarbamol (ROBAXIN) 750 MG tablet Take 750 mg by mouth 4 times daily as needed for muscle spasms   Yes Unknown, Entered By History   naphazoline-pheniramine (OPCON-A) SOLN ophthalmic solution Place 1-2 drops into both eyes 2 times daily   Yes Unknown, Entered By History   potassium 99 MG TABS Take 1 tablet by mouth daily OTC   Yes Unknown, Entered By History   VITAMIN A PO Take 1 capsule by mouth daily   Yes Unknown, Entered By History   vitamin C with B complex (B COMPLEX-C) tablet Take 1 tablet by mouth daily   Yes Reported, Patient   vitamin D3 (CHOLECALCIFEROL) 125 MCG (5000 UT) tablet Take 5,000 Units by mouth daily   Yes Reported, Patient   VITAMIN E PO Take 1 capsule by mouth daily   Yes Unknown, Entered By History         VITALS:  Patient Vitals for the past 24 hrs:   BP Temp Temp src Pulse Resp SpO2   12/09/22 1710 (!) 149/70 98.8  F (37.1  C) Oral 56 16 98 %   12/09/22 0906 119/68 99.2  F (37.3  C) Oral 66 16 97 %   12/08/22 2330 122/68 -- -- 70 16 96 %         Results:  LABS:  Results for orders placed or performed during the hospital encounter of 12/08/22   Comprehensive metabolic panel   Result Value Ref Range    Sodium 142 136 - 145 mmol/L    Potassium 3.5 3.4 - 5.3 mmol/L    Chloride 107 98 - 107 mmol/L    Carbon  Dioxide (CO2) 22 22 - 29 mmol/L    Anion Gap 13 7 - 15 mmol/L    Urea Nitrogen 6.3 6.0 - 20.0 mg/dL    Creatinine 0.82 0.51 - 0.95 mg/dL    Calcium 8.1 (L) 8.6 - 10.0 mg/dL    Glucose 118 (H) 70 - 99 mg/dL    Alkaline Phosphatase 54 35 - 104 U/L    AST 24 10 - 35 U/L    ALT 22 10 - 35 U/L    Protein Total 6.9 6.4 - 8.3 g/dL    Albumin 4.3 3.5 - 5.2 g/dL    Bilirubin Total 0.2 <=1.2 mg/dL    GFR Estimate 87 >60 mL/min/1.73m2   Result Value Ref Range     26 - 192 U/L   Result Value Ref Range    Alcohol ethyl 0.17 (H) <=0.01 g/dL   Salicylate level   Result Value Ref Range    Salicylate <0.5   mg/dL   Acetaminophen level   Result Value Ref Range    Acetaminophen <5.0 (L) 10.0 - 30.0 ug/mL   CBC with platelets and differential   Result Value Ref Range    WBC Count 7.6 4.0 - 11.0 10e3/uL    RBC Count 4.99 3.80 - 5.20 10e6/uL    Hemoglobin 14.7 11.7 - 15.7 g/dL    Hematocrit 44.7 35.0 - 47.0 %    MCV 90 78 - 100 fL    MCH 29.5 26.5 - 33.0 pg    MCHC 32.9 31.5 - 36.5 g/dL    RDW 14.0 10.0 - 15.0 %    Platelet Count 270 150 - 450 10e3/uL    % Neutrophils 75 %    % Lymphocytes 21 %    % Monocytes 4 %    % Eosinophils 0 %    % Basophils 0 %    % Immature Granulocytes 0 %    NRBCs per 100 WBC 0 <1 /100    Absolute Neutrophils 5.7 1.6 - 8.3 10e3/uL    Absolute Lymphocytes 1.6 0.8 - 5.3 10e3/uL    Absolute Monocytes 0.3 0.0 - 1.3 10e3/uL    Absolute Eosinophils 0.0 0.0 - 0.7 10e3/uL    Absolute Basophils 0.0 0.0 - 0.2 10e3/uL    Absolute Immature Granulocytes 0.0 <=0.4 10e3/uL    Absolute NRBCs 0.0 10e3/uL   Drug abuse screen 77 urine (FL, RH, SH)   Result Value Ref Range    Amphetamines Urine Screen Negative Screen Negative    Barbituates Urine Screen Negative Screen Negative    Benzodiazepine Urine Screen Negative Screen Negative    Cannabinoids Urine Screen Positive (A) Screen Negative    Opiates Urine Screen Negative Screen Negative    PCP Urine Screen Negative Screen Negative    Cocaine Urine Screen Negative Screen  Negative   Extra Green Top (Lithium Heparin) ON ICE   Result Value Ref Range    Hold Specimen JIC    Extra Blue Top Tube   Result Value Ref Range    Hold Specimen JIC    Result Value Ref Range    Magnesium 2.2 1.7 - 2.3 mg/dL   Asymptomatic COVID-19 Virus (Coronavirus) by PCR Nasopharyngeal    Specimen: Nasopharyngeal; Swab   Result Value Ref Range    SARS CoV2 PCR Negative Negative         RADIOLOGY:  No orders to display           I, Dre Dejesus, am serving as a scribe to document services personally performed by Dr. Damaris Arriola based on my observation and the provider's statements to me. I, Dr. Damaris Arriola MD attest that Dre Dejesus is acting in a scribe capacity, has observed my performance of the services and has documented them in accordance with my direction.      Damaris Arriola M.D. [facility]  Emergency Medicine and Medical Toxicology  Formerly [facility] EMERGENCY DEPARTMENT  [facility] [facility] [id number] [phone]  Dept: [phone]         Damaris Arriola MD  12/09/22 6459

## 2022-12-09 NOTE — ED NOTES
[facility] ED Mental Health Handoff Note:     Assuming care from: Dr Spenser Walden    Brief HPI: 52 year old female signed out to me by the above provider. See initial ED Provider note for full details of the presentation.     Per EMS, the patient made a suicide statement to her daughter last night. Daughter left the house around 2 AM for work and called the patient later today who didn't answer so she called EMS. Medics found her unresponsive in her bed at home with green emesis along with an empty fireball bottle next to the bed. Daughter notes that she takes antidepressants that have a greenish color. BP was 103/65, O2 % on room air, and blood sugar 130 en route to the ED.    Patient arrives with an empty vial of hydroxyzine (25 MG, 60 capsules, filled 5/30/22), hydroxyzine (25 mg, 60 capsules, filled 12/9/2021), hydroxyzine (10 MG, 180 capsules, filled 9/13/22) and fluoxetine (20 MG, 90 capsules, filled 9/13/2022). Patient also arrived with a vial of methocarbamol (750 MG, 90 capsules, filled 6/24/2022) with 54 capsules left.     Home meds reviewed and ordered/administered: N/A  Medically stable for inpatient mental health admission: Yes.  Evaluated by mental health: Yes. The recommendation is for inpatient mental health treatment. Bed search in process  Safety concerns: At the time I received sign out, there were no safety concerns.    Hold Status:  Active Orders   N/A       Labs/Imaging:   Results for orders placed or performed during the hospital encounter of 12/08/22 (from the past 24 hour(s))   CBC with platelets differential     Status: None    Collection Time: 12/08/22  1:32 PM    Narrative    The following orders were created for panel order CBC with platelets differential.  Procedure                               Abnormality         Status                     ---------                               -----------         ------                     CBC with platelets and d...[[id number]]                       Final result                 Please view results for these tests on the individual orders.   Comprehensive metabolic panel     Status: Abnormal    Collection Time: 12/08/22  1:32 PM   Result Value Ref Range    Sodium 142 136 - 145 mmol/L    Potassium 3.5 3.4 - 5.3 mmol/L    Chloride 107 98 - 107 mmol/L    Carbon Dioxide (CO2) 22 22 - 29 mmol/L    Anion Gap 13 7 - 15 mmol/L    Urea Nitrogen 6.3 6.0 - 20.0 mg/dL    Creatinine 0.82 0.51 - 0.95 mg/dL    Calcium 8.1 (L) 8.6 - 10.0 mg/dL    Glucose 118 (H) 70 - 99 mg/dL    Alkaline Phosphatase 54 35 - 104 U/L    AST 24 10 - 35 U/L    ALT 22 10 - 35 U/L    Protein Total 6.9 6.4 - 8.3 g/dL    Albumin 4.3 3.5 - 5.2 g/dL    Bilirubin Total 0.2 <=1.2 mg/dL    GFR Estimate 87 >60 mL/min/1.73m2   CK total     Status: Normal    Collection Time: 12/08/22  1:32 PM   Result Value Ref Range     26 - 192 U/L   Alcohol ethyl     Status: Abnormal    Collection Time: 12/08/22  1:32 PM   Result Value Ref Range    Alcohol ethyl 0.17 (H) <=0.01 g/dL   Salicylate level     Status: Normal    Collection Time: 12/08/22  1:32 PM   Result Value Ref Range    Salicylate <0.5   mg/dL   Acetaminophen level     Status: Abnormal    Collection Time: 12/08/22  1:32 PM   Result Value Ref Range    Acetaminophen <5.0 (L) 10.0 - 30.0 ug/mL   CBC with platelets and differential     Status: None    Collection Time: 12/08/22  1:32 PM   Result Value Ref Range    WBC Count 7.6 4.0 - 11.0 10e3/uL    RBC Count 4.99 3.80 - 5.20 10e6/uL    Hemoglobin 14.7 11.7 - 15.7 g/dL    Hematocrit 44.7 35.0 - 47.0 %    MCV 90 78 - 100 fL    MCH 29.5 26.5 - 33.0 pg    MCHC 32.9 31.5 - 36.5 g/dL    RDW 14.0 10.0 - 15.0 %    Platelet Count 270 150 - 450 10e3/uL    % Neutrophils 75 %    % Lymphocytes 21 %    % Monocytes 4 %    % Eosinophils 0 %    % Basophils 0 %    % Immature Granulocytes 0 %    NRBCs per 100 WBC 0 <1 /100    Absolute Neutrophils 5.7 1.6 - 8.3 10e3/uL    Absolute Lymphocytes 1.6 0.8 - 5.3 10e3/uL     Absolute Monocytes 0.3 0.0 - 1.3 10e3/uL    Absolute Eosinophils 0.0 0.0 - 0.7 10e3/uL    Absolute Basophils 0.0 0.0 - 0.2 10e3/uL    Absolute Immature Granulocytes 0.0 <=0.4 10e3/uL    Absolute NRBCs 0.0 10e3/uL   [place] Draw     Status: None    Collection Time: 12/08/22  1:32 PM    Narrative    The following orders were created for panel order [place] Draw.  Procedure                               Abnormality         Status                     ---------                               -----------         ------                     Extra Green Top (Lithium...[[id number]]                      Final result                 Please view results for these tests on the individual orders.   Extra Green Top (Lithium Heparin) ON ICE     Status: None    Collection Time: 12/08/22  1:32 PM   Result Value Ref Range    Hold Specimen [facility]    [place] Draw     Status: None    Collection Time: 12/08/22  1:33 PM    Narrative    The following orders were created for panel order [place] Draw.  Procedure                               Abnormality         Status                     ---------                               -----------         ------                     Extra Blue Top Tube[[id number]]                              Final result                 Please view results for these tests on the individual orders.   Extra Blue Top Tube     Status: None    Collection Time: 12/08/22  1:33 PM   Result Value Ref Range    Hold Specimen [facility]    Urine Drugs of Abuse Screen     Status: Abnormal    Collection Time: 12/08/22  1:47 PM    Narrative    The following orders were created for panel order Urine Drugs of Abuse Screen.  Procedure                               Abnormality         Status                     ---------                               -----------         ------                     Drug abuse screen 77 uri...[[id number]]  Abnormal            Final result                 Please view results for these tests on the individual orders.    Drug abuse screen 77 urine (FL, RH, SH)     Status: Abnormal    Collection Time: 12/08/22  1:47 PM   Result Value Ref Range    Amphetamines Urine Screen Negative Screen Negative    Barbituates Urine Screen Negative Screen Negative    Benzodiazepine Urine Screen Negative Screen Negative    Cannabinoids Urine Screen Positive (A) Screen Negative    Opiates Urine Screen Negative Screen Negative    PCP Urine Screen Negative Screen Negative    Cocaine Urine Screen Negative Screen Negative   Magnesium     Status: Normal    Collection Time: 12/08/22  5:04 PM   Result Value Ref Range    Magnesium 2.2 1.7 - 2.3 mg/dL   Asymptomatic COVID-19 Virus (Coronavirus) by PCR Nasopharyngeal     Status: Normal    Collection Time: 12/08/22  7:07 PM    Specimen: Nasopharyngeal; Swab   Result Value Ref Range    SARS CoV2 PCR Negative Negative    Narrative    Testing was performed using the Xpert Xpress SARS-CoV-2 Assay on the Cepheid Gene-Xpert Instrument Systems. Additional information about this Emergency Use Authorization (EUA) assay can be found via the Lab Guide. This test should be ordered for the detection of SARS-CoV-2 in individuals who meet SARS-CoV-2 clinical and/or epidemiological criteria as well as from individuals without symptoms or other reasons to suspect COVID-19. Test performance for asymptomatic patients has only been established in anterior nasal swab specimens. This test is for in vitro diagnostic use under the FDA EUA for laboratories certified under CLIA to perform high complexity testing. This test has not been FDA cleared or approved. A negative result does not rule out the presence of PCR inhibitors in the specimen or target RNA concentration below the limit of detection for the assay. The possibility of a false negative should be considered if the patient's recent exposure or clinical presentation suggests COVID-19.. This test was validated by the [facility]. This  "laboratory is certified under the Clinical Laboratory Improvement Amendments (CLIA) as qualified to perform high complexity laboratory testing.           ED Meds:  Medications   sodium chloride 0.9% infusion ( Intravenous New Bag 12/9/22 0055)   0.9% sodium chloride BOLUS (0 mLs Intravenous Stopped 12/8/22 1500)   magnesium sulfate 2 g in water intermittent infusion (0 g Intravenous Stopped 12/8/22 1430)   potassium chloride ER (KLOR-CON M) CR tablet 40 mEq (40 mEq Oral Given 12/8/22 1651)   potassium chloride 10 mEq in 100 mL sterile water infusion (0 mEq Intravenous Stopped 12/8/22 2040)   ondansetron (ZOFRAN) injection 4 mg (4 mg Intravenous Given 12/8/22 1922)   acetaminophen (TYLENOL) tablet 650 mg (650 mg Oral Given 12/9/22 0003)     No orders to display       ED Course:  ED Course as of 12/09/22 0456   Thu Dec 08, 2022   1340 Case discussed with Poison Control Center.  Agreed with laboratory work-up and treatment as ordered.  Recommend repeat EKG after magnesium to see if QT has shortened.   1407 Alcohol ethyl(!): 0.17   1512 Cannabinoids Qual Urine(!): Screen Positive   1521 Pt is medically cleared    1814 Spoke w DEC , Damaris, who spoke w pt/pt's daughter. Pt states impulsive and wouldn't have done this unless she was drinking. Daughter states hx of SI but never acted on them. Last night expressed SI to daughter and stated she would never act on it, but then OD'ed when daughter left for work. Daughter told pt she was happy she was alive, pt stated to daughter \"I guess I should be happy too.\"         There were no significant events during my shift.    Patient was signed out to the oncoming provider, Dr. Walt Langston at shift change    Impression:    ICD-10-CM    1. Overdose, intentional self-harm, initial encounter (H)  T50.902A       2. Suicide attempt (H)  T14.91XA       3. Alcoholic intoxication without complication (H)  F10.920           Plan:    1. Awaiting inpatient mental health " admission/transfer.    Umair Sánchez  [facility] EMERGENCY DEPARTMENT  [facility] [facility] [id number] [phone]                       Roopa Deras MD  12/09/22 0654

## 2022-12-09 NOTE — ED NOTES
".Misa Briseno  2022  Plan of Care Hand-off Note     Patient Care Path: Inpatient Mental Health    Plan for Care:     Patient attempted to kill herself this morning by ingesting a \"handful of prozac and a months worth of vistoril\"   Patient reports she felt suicidal last night and relapsed on alcohol.   Her   in 2022.   Her daughter is concerned as she doesn't seem to feel bad about the attempt.   Admission is recommended for further evaluation and monitoring    Critical Safety Issues: None noted    Overview:  This patient is a child/adolescent: No    This patient has additional special visitor precautions: No    Legal Status: KAREN    Contacts:       Psychiatry Consult:  Psychiatry Consult not requested because pateint is being admitted    Updated RN regarding plan of care.    IDALMIS Chakraborty      "

## 2022-12-09 NOTE — ED NOTES
"ED to Behavioral Floor Handoff    SITUATION  Holly Briseno is a 52 year old female who speaks English and lives in a home with family members The patient arrived in the ED by ambulance from home with a complaint of Altered Mental Status  .The patient's current symptoms started/worsened 1 week(s) ago and during this time the symptoms have increased.   In the ED, pt was diagnosed with   Final diagnoses:   Overdose, intentional self-harm, initial encounter (H)   Suicide attempt (H)   Alcoholic intoxication without complication (H)        Initial vitals were: BP: 107/53  Pulse: 62  Temp: 98.5  F (36.9  C)  Resp: 14  Height: 165.1 cm (5' 5\")  Weight: 61.2 kg (135 lb)  SpO2: 95 %   --------  Is the patient diabetic? No   If yes, last blood glucose? --     If yes, was this treated in the ED? --  --------  Is the patient inebriated (ETOH) No or Impaired on other substances? No  MSSA done? Yes  Last MSSA score: --    Were withdrawal symptoms treated? No  Does the patient have a seizure history? No. If yes, date of most recent seizure--  --------  Is the patient patient experiencing suicidal ideation? reports the following suicide factors: alcohol intake     Homicidal ideation? denies current or recent homicidal ideation or behaviors.    Self-injurious behavior/urges? denies current or recent self injurious behavior or ideation.  ------  Was pt aggressive in the ED No  Was a code called No  Is the pt now cooperative? Yes  -------  Meds given in ED:   Medications   FLUoxetine (PROzac) capsule 20 mg (has no administration in time range)   0.9% sodium chloride BOLUS (0 mLs Intravenous Stopped 12/8/22 1500)   magnesium sulfate 2 g in water intermittent infusion (0 g Intravenous Stopped 12/8/22 1430)   potassium chloride ER (KLOR-CON M) CR tablet 40 mEq (40 mEq Oral Given 12/8/22 1651)   potassium chloride 10 mEq in 100 mL sterile water infusion (0 mEq Intravenous Stopped 12/8/22 2040)   ondansetron (ZOFRAN) injection 4 mg (4 mg " Intravenous Given 12/8/22 1922)   acetaminophen (TYLENOL) tablet 650 mg (650 mg Oral Given 12/9/22 0003)      Family present during ED course? Yes  Family currently present? No    BACKGROUND  Does the patient have a cognitive impairment or developmental disability? No  Allergies:   Allergies   Allergen Reactions    Amoxicillin Rash     Per the Antimicrobial Stewardship Team 10/19:  No similarities in side chains for Amoxicillin and Ancef, very low to no risk of cross-reactivity.    Sulfa Drugs Hives, Itching and Rash   .   Social demographics are   Social History     Socioeconomic History    Marital status:      Spouse name: None    Number of children: None    Years of education: None    Highest education level: None   Tobacco Use    Smoking status: Never    Smokeless tobacco: Never   Vaping Use    Vaping Use: Never used   Substance and Sexual Activity    Alcohol use: Not Currently    Drug use: Never    Sexual activity: Not Currently     Partners: Male     Birth control/protection: None     Comment: am recently - had a vasectomy   Other Topics Concern    Parent/sibling w/ CABG, MI or angioplasty before 65F 55M? No        ASSESSMENT  Labs results   Labs Ordered and Resulted from Time of ED Arrival to Time of ED Departure   COMPREHENSIVE METABOLIC PANEL - Abnormal       Result Value    Sodium 142      Potassium 3.5      Chloride 107      Carbon Dioxide (CO2) 22      Anion Gap 13      Urea Nitrogen 6.3      Creatinine 0.82      Calcium 8.1 (*)     Glucose 118 (*)     Alkaline Phosphatase 54      AST 24      ALT 22      Protein Total 6.9      Albumin 4.3      Bilirubin Total 0.2      GFR Estimate 87     ETHYL ALCOHOL LEVEL - Abnormal    Alcohol ethyl 0.17 (*)    ACETAMINOPHEN LEVEL - Abnormal    Acetaminophen <5.0 (*)    DRUG ABUSE SCREEN 77 URINE (FL, RH, SH) - Abnormal    Amphetamines Urine Screen Negative      Barbituates Urine Screen Negative      Benzodiazepine Urine Screen Negative       "Cannabinoids Urine Screen Positive (*)     Opiates Urine Screen Negative      PCP Urine Screen Negative      Cocaine Urine Screen Negative     CK TOTAL - Normal         SALICYLATE LEVEL - Normal    Salicylate <0.5     MAGNESIUM - Normal    Magnesium 2.2     COVID-19 VIRUS (CORONAVIRUS) BY PCR - Normal    SARS CoV2 PCR Negative     CBC WITH PLATELETS AND DIFFERENTIAL    WBC Count 7.6      RBC Count 4.99      Hemoglobin 14.7      Hematocrit 44.7      MCV 90      MCH 29.5      MCHC 32.9      RDW 14.0      Platelet Count 270      % Neutrophils 75      % Lymphocytes 21      % Monocytes 4      % Eosinophils 0      % Basophils 0      % Immature Granulocytes 0      NRBCs per 100 WBC 0      Absolute Neutrophils 5.7      Absolute Lymphocytes 1.6      Absolute Monocytes 0.3      Absolute Eosinophils 0.0      Absolute Basophils 0.0      Absolute Immature Granulocytes 0.0      Absolute NRBCs 0.0        Imaging Studies: No results found for this or any previous visit (from the past 24 hour(s)).   Most recent vital signs /68   Pulse 66   Temp 99.2  F (37.3  C) (Oral)   Resp 16   Ht 1.651 m (5' 5\")   Wt 61.2 kg (135 lb)   SpO2 97%   BMI 22.47 kg/m     Abnormal labs/tests/findings requiring intervention:---   Pain control: good  Nausea control: good    RECOMMENDATION  Are any infection precautions needed (MRSA, VRE, etc.)? No If yes, what infection? --  ---  Does the patient have mobility issues? independently. If yes, what device does the pt use? ---  ---  Is patient on 72 hour hold or commitment? No If on 72 hour hold, have hold and rights been given to patient? N/A  Are admitting orders written if after 10 p.m. ?N/A  Tasks needing to be completed:---     Celestina Sands, [facility]   ascom--    [phone] [facility]   [phone] [facility]   "

## 2022-12-10 ENCOUNTER — HOSPITAL ENCOUNTER (INPATIENT)
Facility: CLINIC | Age: 52
LOS: 3 days | Discharge: HOME | End: 2022-12-13
Attending: PSYCHIATRY & NEUROLOGY | Admitting: PSYCHIATRY & NEUROLOGY
Payer: COMMERCIAL

## 2022-12-10 VITALS
HEART RATE: 65 BPM | RESPIRATION RATE: 16 BRPM | DIASTOLIC BLOOD PRESSURE: 82 MMHG | HEIGHT: 65 IN | BODY MASS INDEX: 22.49 KG/M2 | OXYGEN SATURATION: 98 % | TEMPERATURE: 98.1 F | WEIGHT: 135 LBS | SYSTOLIC BLOOD PRESSURE: 136 MMHG

## 2022-12-10 DIAGNOSIS — F10.939 ALCOHOL USE WITH WITHDRAWAL (H): ICD-10-CM

## 2022-12-10 DIAGNOSIS — F33.1 MAJOR DEPRESSIVE DISORDER, RECURRENT, MODERATE (H): Primary | ICD-10-CM

## 2022-12-10 PROCEDURE — 250N000013 HC RX MED GY IP 250 OP 250 PS 637: Performed by: PSYCHIATRY & NEUROLOGY

## 2022-12-10 PROCEDURE — 99222 1ST HOSP IP/OBS MODERATE 55: CPT | Mod: AI | Performed by: PSYCHIATRY & NEUROLOGY

## 2022-12-10 PROCEDURE — 124N000002 HC R&B MH UMMC

## 2022-12-10 RX ORDER — MULTIPLE VITAMINS W/ MINERALS TAB 9MG-400MCG
1 TAB ORAL DAILY
Status: DISCONTINUED | OUTPATIENT
Start: 2022-12-10 | End: 2022-12-13 | Stop reason: HOSPADM

## 2022-12-10 RX ORDER — FOLIC ACID 1 MG/1
1 TABLET ORAL DAILY
Status: DISCONTINUED | OUTPATIENT
Start: 2022-12-10 | End: 2022-12-13 | Stop reason: HOSPADM

## 2022-12-10 RX ORDER — OLANZAPINE 10 MG/2ML
5 INJECTION, POWDER, FOR SOLUTION INTRAMUSCULAR EVERY 6 HOURS PRN
Status: DISCONTINUED | OUTPATIENT
Start: 2022-12-10 | End: 2022-12-13 | Stop reason: HOSPADM

## 2022-12-10 RX ORDER — MAGNESIUM HYDROXIDE/ALUMINUM HYDROXICE/SIMETHICONE 120; 1200; 1200 MG/30ML; MG/30ML; MG/30ML
30 SUSPENSION ORAL EVERY 4 HOURS PRN
Status: DISCONTINUED | OUTPATIENT
Start: 2022-12-10 | End: 2022-12-13 | Stop reason: HOSPADM

## 2022-12-10 RX ORDER — IBUPROFEN 200 MG
200-600 TABLET ORAL EVERY 6 HOURS PRN
Status: DISCONTINUED | OUTPATIENT
Start: 2022-12-10 | End: 2022-12-13 | Stop reason: HOSPADM

## 2022-12-10 RX ORDER — OLANZAPINE 5 MG/1
5 TABLET ORAL EVERY 6 HOURS PRN
Status: DISCONTINUED | OUTPATIENT
Start: 2022-12-10 | End: 2022-12-13 | Stop reason: HOSPADM

## 2022-12-10 RX ORDER — ACETAMINOPHEN 325 MG/1
325 TABLET ORAL EVERY 4 HOURS PRN
Status: DISCONTINUED | OUTPATIENT
Start: 2022-12-10 | End: 2022-12-13 | Stop reason: HOSPADM

## 2022-12-10 RX ORDER — LORAZEPAM 1 MG/1
1-4 TABLET ORAL EVERY 30 MIN PRN
Status: DISCONTINUED | OUTPATIENT
Start: 2022-12-10 | End: 2022-12-13 | Stop reason: HOSPADM

## 2022-12-10 RX ADMIN — ACETAMINOPHEN 325 MG: 325 TABLET, FILM COATED ORAL at 08:49

## 2022-12-10 RX ADMIN — THIAMINE HCL TAB 100 MG 100 MG: 100 TAB at 08:49

## 2022-12-10 RX ADMIN — MULTIPLE VITAMINS W/ MINERALS TAB 1 TABLET: TAB at 08:49

## 2022-12-10 RX ADMIN — IBUPROFEN 600 MG: 200 TABLET, FILM COATED ORAL at 14:01

## 2022-12-10 RX ADMIN — FOLIC ACID 1 MG: 1 TABLET ORAL at 08:49

## 2022-12-10 ASSESSMENT — ACTIVITIES OF DAILY LIVING (ADL)
ADLS_ACUITY_SCORE: 45
ADLS_ACUITY_SCORE: 30
ADLS_ACUITY_SCORE: 39
ADLS_ACUITY_SCORE: 39
TOILETING_ISSUES: NO
ADLS_ACUITY_SCORE: 30
ORAL_HYGIENE: INDEPENDENT
FALL_HISTORY_WITHIN_LAST_SIX_MONTHS: NO
HYGIENE/GROOMING: INDEPENDENT
CONCENTRATING,_REMEMBERING_OR_MAKING_DECISIONS_DIFFICULTY: NO
DOING_ERRANDS_INDEPENDENTLY_DIFFICULTY: NO
WEAR_GLASSES_OR_BLIND: YES
ADLS_ACUITY_SCORE: 30
DRESS: INDEPENDENT;SCRUBS (BEHAVIORAL HEALTH)
ADLS_ACUITY_SCORE: 30
ADLS_ACUITY_SCORE: 30
HYGIENE/GROOMING: INDEPENDENT
ADLS_ACUITY_SCORE: 39
DIFFICULTY_EATING/SWALLOWING: NO
ADLS_ACUITY_SCORE: 45
ORAL_HYGIENE: INDEPENDENT
ADLS_ACUITY_SCORE: 30
DRESS: INDEPENDENT
DRESSING/BATHING_DIFFICULTY: NO
CHANGE_IN_FUNCTIONAL_STATUS_SINCE_ONSET_OF_CURRENT_ILLNESS/INJURY: NO
ADLS_ACUITY_SCORE: 30
VISION_MANAGEMENT: GLASSES FOR DRIVING
WALKING_OR_CLIMBING_STAIRS_DIFFICULTY: NO

## 2022-12-10 NOTE — PLAN OF CARE
"Situation  Patient is a 52 year old female arriving to [facility] at 05:15 via non emergency transport from [facility] for attempted suicide, relapse on alcohol and suspected overdose on prescriptions drugs with multiple empty bottles of medications (Prozac, Hydroxyzine and Robaxin. -Drug screen was postive for Cannabinoids. COVID results negative. Patient tearful during admission assessments, endorses anxiety and stated \"I am tired crying\".      Background  Patient has a history of Anxiety, Depression and Alcoholism. Previous psychiatric admission history patient was last admitted February of 2021     Legal Status  Pt admitted under a voluntary status  A Release of Information has been ok for patient Daughter Mehnaz    Assessment  Patient is alert and oriented to time, place situation and person. Rates depression 6/10 and anxiety 6/10. She currently denies SI,SIB and HI; agree to contract for safety at this time. Writer explained patient bill of rights;The patient had no questions or concerns. Writer orientated patient to the unit and explained unit rules; The patient had no questions or concerns. Will continue to monitor and provide interventions as necessary.      Medications  See MAR  .        Vitals  BP (!) 154/77 (BP Location: Right arm, Patient Position: Sitting)   Pulse 63   Temp 98.9  F (37.2  C) (Oral)   Resp 18   Ht 1.651 m (5' 5\")   Wt 60.1 kg (132 lb 9.6 oz)   SpO2 99%   BMI 22.07 kg/m       Response  Patient will be placed in N219/N219-01 at this time. SI and Withdrawal  precautions will be implemented given patient history and admission criteria. The patient had no questions or concerns.. Will continue to monitor and provide interventions at this time.     Problem: Anxiety  Goal: Anxiety Reduction or Resolution  Outcome: Progressing     Problem: Depression  Goal: Improved Mood  Outcome: Progressing     Problem: Suicidal Behavior  Goal: Suicidal Behavior is Absent or Managed  Outcome: " Progressing   Goal Outcome Evaluation:

## 2022-12-10 NOTE — PLAN OF CARE
12/10/22 1045   Patient Belongings   Did you bring any home meds/supplements to the hospital?  Yes   Disposition of meds  Sent to security/pharmacy per site process   Patient Belongings locker   Patient Belongings Put in Hospital Secure Location (Security or Locker, etc.) clothing   Belongings Search Yes   Clothing Search Yes   Comment Search was done by two other staff.     Patient Belongings Stored in Unit Locker:   Pink underwear  Gray sports bra  White T-shirt   Hygiene products: purple toothbrush, Medichoice shampoo, yellow brush, toothpaste  Silver necklace with ring                    Admission:  I am responsible for any personal items that are not sent to the safe or pharmacy.  [facility] is not responsible for loss, theft or damage of any property in my possession.    Signature:  _________________________________ Date: _______  Time: _____                                              Staff Signature:  ____________________________ Date: ________  Time: _____      2nd Staff person, if patient is unable/unwilling to sign:    Signature: ________________________________ Date: ________  Time: _____     Discharge:  [facility] has returned all of my personal belongings:    Signature: _________________________________ Date: ________  Time: _____                                          Staff Signature:  ____________________________ Date: ________  Time: _____

## 2022-12-10 NOTE — PHARMACY-ADMISSION MEDICATION HISTORY
Please see Admission Medication History note completed by pharmacist on 12/8/22 under previous encounter at [facility] Emergency Department for information regarding prior to admission medications.

## 2022-12-10 NOTE — PLAN OF CARE
"Has been out in the milieu most of the morning, tends to keep to herself. Pleasant and cooperative. Reports feeling anxious, rating anxiety 6/10.  Denies depression, stating \"I feel sadness but not really depression\".  Affect is sad, mood is calm.   Denies suicidal thoughts, stating that she has only had thoughts with excessive alcohol consumption.  Report overdose prior to admission was impulsive after consuming alcohol, \"I can't even remember what my thoughts were at that time.\"   C/o chronic neck and shoulder tightness and discomfort.  Medicated x 1 with Tylenol.  Requesting to have Ibuprofen for pain, MD called and order obtained.     Problem: Anxiety  Goal: Anxiety Reduction or Resolution  Outcome: Not Progressing     Problem: Depression  Goal: Improved Mood  Outcome: Not Progressing     Problem: Suicidal Behavior  Goal: Suicidal Behavior is Absent or Managed  Outcome: Progressing   Goal Outcome Evaluation:    Plan of Care Reviewed With: patient                   "

## 2022-12-10 NOTE — ED NOTES
[facility] ED Mental Health Handoff Note:     Assuming care from: Dr Damaris Arriola    Brief HPI: 52 year old female signed out to me by the above provider. See initial ED Provider note for full details of the presentation.   In brief, patient presented for alcohol intoxication and possible overdose of hydroxyzine, fluoxetine, and methocarbamol. Patient is waiting for inpatient bed placement.     Home meds reviewed and ordered/administered: Yes  Medically stable for inpatient mental health admission: Yes.  Evaluated by mental health: Yes. The recommendation is for inpatient mental health treatment. Bed secured and awaiting admission/transfer to [facility].  Safety concerns: At the time I received sign out, there were no safety concerns.    Hold Status:  Active Orders   N/A        Labs/Imaging:   No results found for this visit on 12/08/22 (from the past 24 hour(s)).      ED Meds:  Medications   FLUoxetine (PROzac) capsule 20 mg (20 mg Oral Given 12/9/22 1623)   0.9% sodium chloride BOLUS (0 mLs Intravenous Stopped 12/8/22 1500)   magnesium sulfate 2 g in water intermittent infusion (0 g Intravenous Stopped 12/8/22 1430)   potassium chloride ER (KLOR-CON M) CR tablet 40 mEq (40 mEq Oral Given 12/8/22 1651)   potassium chloride 10 mEq in 100 mL sterile water infusion (0 mEq Intravenous Stopped 12/8/22 2040)   ondansetron (ZOFRAN) injection 4 mg (4 mg Intravenous Given 12/8/22 1922)   acetaminophen (TYLENOL) tablet 650 mg (650 mg Oral Given 12/9/22 0003)     No orders to display       There were no significant events during my shift.      Impression:    ICD-10-CM    1. Overdose, intentional self-harm, initial encounter (H)  T50.902A       2. Suicide attempt (H)  T14.91XA       3. Alcoholic intoxication without complication (H)  F10.920           Plan:    1. Admitted/transferred to inpatient mental health bed.    Benny Correa DO  [facility] EMERGENCY DEPARTMENT  [address]  [facility] [id number]  [phone]                   Benny Correa MD  12/10/22 0342

## 2022-12-10 NOTE — PLAN OF CARE
"Initial Psychosocial Assessment    I have reviewed the chart, met with the patient, and developed Care Plan.  Information for assessment was obtained from: chart review and interview with patient     Presenting Problem:  Pt is a 53 yo female who presented to the ED following a suicide attempt on alcohol and prescription drugs. with multiple empty bottles of medications (Prozac, Hydroxyzine and Robaxin. -Drug screen was postive for Cannabinoids. COVID results negative. Patient tearful during admission assessments, endorses anxiety and stated \"I am tired crying\".     In meeting with Holly she was calm, pleasant, cooperative.  Mood is sad, depressed and she is tearful and overwhelmed.   She reports she is a , her   in 2022.  There has been substantial stress since her husbands passing with his family.  She feels dismissed and ignored by his family with them feeling she  him only fro his money.  This has triggered a lot of stress for her and she relapsed on alcohol. She reports on her way home she stopped at the liquor store.  The patient reports she drank and then took a \"handful of Zoloft and a month's worth of vistorol\".         History of Mental Health and Chemical Dependency:  Previous psychiatric admission history patient was last admitted 2021   Pt is currently taking zoloft hydroxozine    Holly reports a diagnosis of depression, anxiety and alcohol abuse.  She completed treatment at [facility] two years and maintained sobriety until her   in July.  She met her  at [facility].     She has been sober for 4 months until today.  She reports one hospitalization at [facility] 2 years ago.  She denies a history of suicide attempts    Yes positive for alcohol and THC    Family Description ([facility], Family Psychiatric History):  She was  in 2022 and and her   1 month later.  She reports his family has rejected her.    DEC Collateral " "Information   The following information was received from Jermaine whose relationship to the patient is daughter. Information was obtained via phone. Their phone number is [phone] and they last had contact with patient on early this morning.     What happened today: The patient reports when she returned ubaldo from work last night, her mom was \"off\".  She gave her space but later went into her bedroom and found her mom crying.  She reports her mother told her she was suicidal but after they talked, mom reported she would not kill herself and they both went to bed.  Patient woke up really early to work out and go to work.  She reports her mom texted her at 3 am to see if she went to the gym.  When Mehnaz called and texted mom later in the morning she had no response.  She report her mother still had not responded at noon so she called  who went to apartment and found mom lying in green throw up.       What is different about patient's functioning: She reports her mother has had suicidal thoughts in past but has never acted on them.      Concern about alcohol/drug use: Yes Mom only has suicidal thoughts when drinking     What do you think the patient needs: inpatient mental health     Has patient made comments about wanting to kill themselves/others:  Yes last night and today she said \"I guess I should be glad I didn't die\"        Significant Life Events (Illness, Abuse, Trauma, Death):   passed away shortly after getting      Living Situation:  Holly lives in an apartment with her 19 year old daughterMehnaz    Educational Background:  unknown    Occupational History:  She has been unemployed for 2 years due to chronic pain.       Financial Status:  unknown    Legal Issues:  None     Ethnic/Cultural Issues:  None     Spiritual Orientation:  She reports she has a good support system from [facility].     Service History:  no    Social Functioning (organization, " interests):  [employer]    Current Treatment Providers are:  - PCP- Dr. Jurado  - Therapist - David Morrow @ [facility] She attends [facility]    Social Service Assessment/Plan:  Patient will have psychiatric assessment and medication management by the psychiatrist. Medications will be reviewed and adjusted per MD as indicated. The treatment team will continue to assess and stabilize the patient's mental health symptoms with the use of medications and therapeutic programming. Hospital staff will provide a safe environment and a therapeutic milieu. Staff will continue to assess patient as needed. Patient will participate in unit groups and activities. Patient will receive individual and group support on the unit.     CTC will do individual inpatient treatment planning and after care planning. CTC will discuss options for increasing community supports with the patient. CTC will coordinate with outpatient providers and will place referrals to ensure appropriate follow up care is in place.

## 2022-12-10 NOTE — PROGRESS NOTES
12/10/22 1415   Group Therapy Session   Group Attendance attended group session   Time Session Began 1315   Time Session Ended 1400   Total Time (minutes) 35   Total # Attendees 3   Group Type recreation   Group Session Detail Education provided on mental health benefits of healthy leisure and group discussion plus hands on Prashanth game for concentration, attention to detail, strategy making, simple problem solving, organization/planning, tracking, healthy leisure, coping, mood stabilization, reality based activity, building self esteem and expanding social skills.   Patient Participation Detail Pt was pleasant and invested in group activity.  Pt was congruent in affect and helpful to a peer who was having difficulty tracking the activity.  Pt left group early, asking to be excused.  No charge.      complains of pain/discomfort
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
## 2022-12-10 NOTE — CONSULTS
Name:  Holly Briseno  : 1970  Date:   12/10/2022  Location of patient: [facility]   Location of doctor: Office [facility]  Length of consult:  10 minutes     Phone Consult:    52 year old female with history of overdose, suicide attempt, and alcohol intoxication who is voluntary for mental health unit admission.      recently and they met in alcohol sobriety treatment.    Medically cleared by the ED Provider.    Voluntary for admission to inpatient mental health unit.    Discussed with staff on site:  yes, Mirlande Linn M.D.  Psychiatry

## 2022-12-11 PROCEDURE — 250N000013 HC RX MED GY IP 250 OP 250 PS 637: Performed by: PSYCHIATRY & NEUROLOGY

## 2022-12-11 PROCEDURE — 124N000002 HC R&B MH UMMC

## 2022-12-11 RX ADMIN — IBUPROFEN 600 MG: 200 TABLET, FILM COATED ORAL at 07:53

## 2022-12-11 RX ADMIN — IBUPROFEN 600 MG: 200 TABLET, FILM COATED ORAL at 13:38

## 2022-12-11 RX ADMIN — MULTIPLE VITAMINS W/ MINERALS TAB 1 TABLET: TAB at 07:53

## 2022-12-11 RX ADMIN — THIAMINE HCL TAB 100 MG 100 MG: 100 TAB at 07:53

## 2022-12-11 RX ADMIN — FOLIC ACID 1 MG: 1 TABLET ORAL at 07:53

## 2022-12-11 ASSESSMENT — ACTIVITIES OF DAILY LIVING (ADL)
ADLS_ACUITY_SCORE: 30
DRESS: SCRUBS (BEHAVIORAL HEALTH);INDEPENDENT
ADLS_ACUITY_SCORE: 30
HYGIENE/GROOMING: INDEPENDENT
DRESS: STREET CLOTHES;SCRUBS (BEHAVIORAL HEALTH)
ADLS_ACUITY_SCORE: 30
ORAL_HYGIENE: INDEPENDENT
ADLS_ACUITY_SCORE: 30
LAUNDRY: WITH SUPERVISION
ADLS_ACUITY_SCORE: 30
ORAL_HYGIENE: INDEPENDENT
ADLS_ACUITY_SCORE: 30
HYGIENE/GROOMING: INDEPENDENT

## 2022-12-11 NOTE — PLAN OF CARE
"  Problem: Anxiety  Goal: Anxiety Reduction or Resolution  Outcome: Progressing     Problem: Depression  Goal: Improved Mood  Outcome: Progressing     Problem: Suicidal Behavior  Goal: Suicidal Behavior is Absent or Managed  Outcome: Progressing     Patient up and visible on the unit most part of the shift, selectively engaged with peers, patient endorsed some  anxiety and depression, refused intervention, used lavender essential oil, with some effect ,patient states \" I am more so sad than depressed. Denied SI/SIB/hallucinations, denies pain or discomfort. Patient is juno for safety, safety checks and precautions in progress. Eating and drinking adequately. Medication compliant. No other known safety concerns at this time, will continue to monitor and offer therapeutic support as needed for the rest of the shift.  "

## 2022-12-11 NOTE — PLAN OF CARE
Reports that she slept well last night. Denies suicidal thoughts and thoughts of self harm.  Reports mood as being up and down, with intermittent sadness and anxiety.   Affect is consistent with sadness.  States she has taken steps to decrease the stress created by decreased 's family by blocking phone and social media interactions with them.  C/o chronic neck and shoulder pain, medicated x 2 with Ibuprofen with good pain relief.       Problem: Anxiety  Goal: Anxiety Reduction or Resolution  Outcome: Progressing     Problem: Depression  Goal: Improved Mood  Outcome: Progressing     Problem: Suicidal Behavior  Goal: Suicidal Behavior is Absent or Managed  Outcome: Progressing   Goal Outcome Evaluation:    Plan of Care Reviewed With: patient

## 2022-12-11 NOTE — PLAN OF CARE
Problem: Suicidal Behavior  Goal: Suicidal Behavior is Absent or Managed  Outcome: Progressing     Problem: Adult Behavioral Health Plan of Care  Goal: Plan of Care Review  Recent Flowsheet Documentation  Taken 12/10/2022 1720 by Fay Vargas RN  Patient Agreement with Plan of Care: agrees   Goal Outcome Evaluation:    Plan of Care Reviewed With: patient      Patient was visible in the lounge and gates way walking  most of the shift. Had flat affect, mood was calm. Patient was very pleasant on approached. Denies anxiety and depression this shift, also denies mental health symptoms SI, SIB, HI and Hallucination. VS was done /67, Pulse 59 writer encourage patient to drink water retake vs Temp 98.6, Resp 18, Pulse 63, /84. MSSA score was 4. Patient interacted with other peers in the lounge, no prn was given. Ate 75% of her dinner. Patient contracted for safety.

## 2022-12-11 NOTE — PLAN OF CARE
Patient appeared to be sleeping most of the night, no complained or request for PRN this shift, patient slept for 7 hours, continue plan of care.  Problem: Sleep Disturbance  Goal: Adequate Sleep/Rest  Outcome: Progressing   Goal Outcome Evaluation:

## 2022-12-12 PROCEDURE — 250N000013 HC RX MED GY IP 250 OP 250 PS 637: Performed by: PSYCHIATRY & NEUROLOGY

## 2022-12-12 PROCEDURE — H2032 ACTIVITY THERAPY, PER 15 MIN: HCPCS

## 2022-12-12 PROCEDURE — 124N000002 HC R&B MH UMMC

## 2022-12-12 PROCEDURE — 99232 SBSQ HOSP IP/OBS MODERATE 35: CPT | Performed by: PSYCHIATRY & NEUROLOGY

## 2022-12-12 PROCEDURE — G0177 OPPS/PHP; TRAIN & EDUC SERV: HCPCS

## 2022-12-12 RX ORDER — DULOXETIN HYDROCHLORIDE 30 MG/1
30 CAPSULE, DELAYED RELEASE ORAL
Qty: 30 CAPSULE | Refills: 0 | Status: ON HOLD | OUTPATIENT
Start: 2022-12-13 | End: 2023-08-13

## 2022-12-12 RX ORDER — FOLIC ACID 1 MG/1
1 TABLET ORAL DAILY
Qty: 30 TABLET | Refills: 0 | Status: ON HOLD | OUTPATIENT
Start: 2022-12-13 | End: 2023-08-15

## 2022-12-12 RX ORDER — LANOLIN ALCOHOL/MO/W.PET/CERES
100 CREAM (GRAM) TOPICAL DAILY
Qty: 30 TABLET | Refills: 0 | Status: ON HOLD | OUTPATIENT
Start: 2022-12-13 | End: 2023-08-15

## 2022-12-12 RX ORDER — DULOXETIN HYDROCHLORIDE 30 MG/1
30 CAPSULE, DELAYED RELEASE ORAL
Status: DISCONTINUED | OUTPATIENT
Start: 2022-12-13 | End: 2022-12-13 | Stop reason: HOSPADM

## 2022-12-12 RX ADMIN — MULTIPLE VITAMINS W/ MINERALS TAB 1 TABLET: TAB at 08:36

## 2022-12-12 RX ADMIN — FOLIC ACID 1 MG: 1 TABLET ORAL at 08:36

## 2022-12-12 RX ADMIN — THIAMINE HCL TAB 100 MG 100 MG: 100 TAB at 08:36

## 2022-12-12 RX ADMIN — IBUPROFEN 600 MG: 200 TABLET, FILM COATED ORAL at 21:11

## 2022-12-12 RX ADMIN — IBUPROFEN 600 MG: 200 TABLET, FILM COATED ORAL at 09:03

## 2022-12-12 ASSESSMENT — ACTIVITIES OF DAILY LIVING (ADL)
ADLS_ACUITY_SCORE: 30
DRESS: SCRUBS (BEHAVIORAL HEALTH);INDEPENDENT
ADLS_ACUITY_SCORE: 30
ORAL_HYGIENE: INDEPENDENT
ADLS_ACUITY_SCORE: 30
HYGIENE/GROOMING: INDEPENDENT;HANDWASHING

## 2022-12-12 NOTE — PLAN OF CARE
[facility] Occupational Therapy Group Intervention Note     12/12/22 1443   Group Therapy Session   Group Attendance attended group session   Time Session Began 1350   Time Session Ended 1430   Total Time (minutes) 40   Total # Attendees 4   Group Type psychoeducation   Group Topic Covered coping skills/lifestyle management   Group Session Detail Mental health management group focusing on coping skill exploration. Education provided on maladaptive versus adaptive response to stress /symptoms and use within routine.    Patient Response/Contribution cooperative with task;organized;listened actively;offered helpful suggestions to peers   Patient Participation Detail Highly engaged. Identified several coping skills utilized to reduce stress and manage symptoms (I.e baking, hiking, crafts with daughter, bathing). Elaborated on application of skills to personal routine. Accepted additional suggestions within conversation.      Carol Giles OT on 12/12/2022 at 2:44 PM

## 2022-12-12 NOTE — PLAN OF CARE
[facility] Occupational Therapy Group Intervention Note     12/12/22 1308   Group Therapy Session   Group Attendance attended group session   Time Session Began 1115   Time Session Ended 1200   Total Time (minutes) 45   Total # Attendees 4   Group Type task skill;life skill   Group Topic Covered coping skills/lifestyle management;structured socialization;leisure exploration/use of leisure time   Group Session Detail Clinic - coping skill exploration, creative expression within personally meaningful activities, and observation of social, cognitive, and kinesthetic performance skills   Patient Response/Contribution cooperative with task;organized;listened actively;offered helpful suggestions to peers   Patient Participation Detail Pleasant - congruent affect. IND to gather materials, organize work space, sequence task, and reach task completion. Demonstrated fair social engagement as she actively engaged in conversation with nearby peers. Expressed interest in discharge.      Carol Giles OT on 12/12/2022 at 1:09 PM

## 2022-12-12 NOTE — PROGRESS NOTES
PSYCHIATRIC PROGRESS NOTE    Admission Date: 12/10/2022  Date of Service: 12/12/2022    The patient was seen, her chart reviewed, her case discussed with staff at the Team Meeting.  She appears to be doing better. She shows no signs of alcohol withdrawal and has never required PRN Ativan.  She regrets having taken an overdose and now states that she took because she was intoxicated with alcohol. She had no suicidal thoughts since admission but continues to be mildly depressed and tearful at times.  Her PTA Prozac, Hydroxyzine and Robaxin were held upon admission.      This is a 52-year-old white  female with a long history of depression, anxiety and alcohol abuse who was initially taken to [facility] ED via ambulance after she was found unresponsive in her apartment by the  who did the wellness check upon her daughter's request because she was not responding to her phone calls. Reportedly the patient took a suicidal overdose by taking a handful of pills including Prozac, Vistaril and Robaxin. She was intoxicated with her BAL being 225 mg/dL. She was placed on CIWA protocol with Ativan and transferred to this hospital and admitted to [facility] where she was seen by Dr. Valle.    MEDICATIONS  Ibuprofen 200-600 mg Q6H PRN  Ativan 1-4 mg Q 30 min according to MSSA score  Zyprexa 10 mg PO or IM TID PRN    LABS: No new results    VS: Pulse - 57, BP - 126/85, Temp - 97.4, Resp - 16, SpO2 - 98%    MENTAL STATUS EXAMINATION  Revealed a normally built and normally developed, generally pleasant, friendly and cooperative 52-year-old white female appearing her stated age. She was alert and oriented X 3. Her speech was coherent, logical and goal related. She was emotionally labile and mildly depressed. She adamantly denied suicidal ideation or intent. No overt psychotic features were noted or elicited. She had some insight into her problems and her judgement did not seem to be  impaired.    DIAGNOSTIC IMPRESSION  Major Depression, recurrent  Bereavement  Alcohol Use Disorder  Chronic Pain Syndrome    PLAN: Will give her a trial with Cymbalta 30 mg QAM and discharge the patient tomorrow to follow-up with Dr. Morrow, a therapist, who will refer her to a psychiatrist.    Bernardo Hallman MD

## 2022-12-12 NOTE — DISCHARGE INSTRUCTIONS
Behavioral Discharge Planning and Instructions    Summary: You were admitted on 12/10/2022  due to  SA .  You were treated by Dr. Hallman and discharged on 2022 from [facility] to Home    Main Diagnosis: Depression, anxiety and alcohol abuse      Health Care Follow-up:     [facility]   Appointment Time/Date: 2022 at 2:40 PM. This will be an in-person appointment   [facility]     Address: [address] [place] [address]    Phone: [phone]        Resources:  Additional Resources for Grief and Loss Counseling:    [facility]: [phone]  The center (www.griefloss.org)    [facility] If you are interested in making an appointment, please call [phone]    The [facility] Grief Support (www.[facility] [email or web address]), sponsored by [facility], holds free weekly meetings every Saturday morning at rotating locations. Participants hear a guest speaker, then divide into discussion groups led by trained facilitators.    Organizations offering grief support in the [facility] include: [facility] in [place] ([phone]), [facility] (087-656-0306 or www.[facility] [facility] at [phone]. The [facility] of [facility] (call Mansi Ramirez at [phone]) and the [employer] (for parents, grandparents and siblings who have experienced the death of a child www.compassionatefriends.org).     Chemical Dependency Resources:    [facility]  Address: [address] [place] [address]  Phone: [phone]    [facility] (IOP with lodging)  Phone: [phone]  Fax: [phone] [address]         [place] [address]      [facility] (IOP with lodging)  Phone: [phone]  Fax: [phone] [address]  [place]  [address]  [address], [address]  [place] [address]    [facility]:  Phone Number: [phone]  Fax Number: [phone]     Bandar Perez  Low Intensity treatment  Call: [phone]  Fax: [phone]     [facility]   [address]   [place] [address]   Phone: [phone]   [email or web address]      Community Drug & Alcohol Support Resources   Alcoholics Anonymous   24/7 Phone Line: 364.329.4004   https://[place]am[place].org/   https://a[place].org/   For additional list of online meetings: http://aa-intergroup.org      OUTPATIENT ADMISSIONS:  3 R s [facility]  [phone]  (f) [phone]  [email or web address] [facility]  [phone]  (f) [phone]  [email or web address]  [phone]  (f) [phone]  [email or web address]  [phone]  (f) [phone]  [email or web address]              Attend all scheduled appointments with your outpatient providers. Call at least 24 hours in advance if you need to reschedule an appointment to ensure continued access to your outpatient providers.     Major Treatments, Procedures and Findings:  You were provided with: a psychiatric assessment, assessed for medical stability, medication evaluation and/or management, group therapy, individual therapy, and milieu management    Symptoms to Report: feeling more aggressive, increased confusion, losing more sleep, mood getting worse, or thoughts of suicide    Early warning signs can include: increased depression or anxiety sleep disturbances increased thoughts or behaviors of suicide or self-harm  increased unusual thinking, such as paranoia or hearing voices    Safety and Wellness:  Take all medicines as directed.  Make no changes unless your doctor suggests them.  Follow treatment recommendations.  Refrain from alcohol and  "non-prescribed drugs.  Ask your support system to help you reduce your access to items that could harm yourself or others. Items could include:  Firearms  Medicines (both prescribed and over-the-counter)  Knives and other sharp objects  Ropes and like materials  Car keys  If there is a concern for safety, call 911. If there is a concern for safety, call 911.    Resources:   Crisis Intervention: 874.408.8290 or 682-666-1526 (TTY: 125.493.8949).  Call anytime for help.  National [place] on Mental Illness (www.[place].rashaad.org): 706.811.8235 or 366-101-8857.  Alcoholics Anonymous (www.alcoholics-anonymous.org): Check your phone book for your local chapter.  Suicide Awareness Voices of Education (SAVE) (www.save.org): 185-183-EOBE (8583)  National Suicide Prevention Line (www.mentalhealthmn.org): 727-798-NKVW (3323)  Mental Health Consumer/Survivor Network of [place] (www.mhcsn.net): 728.299.8262 or 728-177-4338  Mental Health Association of [place] (www.mentalhealth.org): 551.750.5561 or 961-531-8385  Self- Management and Recovery Training., SMART-- Toll free: 237.675.7716  www.[employer].Done.  Text 4 Life: txt \"LIFE\" to 43339 for immediate support and crisis intervention  Crisis text line: Text \"[place]\" to 023482. Free, confidential, 24/7.  [facility] Rapid Response: [phone]    General Medication Instructions:   See your medication sheet(s) for instructions.   Take all medicines as directed.  Make no changes unless your doctor suggests them.   Go to all your doctor visits.  Be sure to have all your required lab tests. This way, your medicines can be refilled on time.  Do not use any drugs not prescribed by your doctor.  Avoid alcohol.    Advance Directives:   Scanned document on file with [facility]? No scanned doc  Is document scanned? Pt states no documents  Honoring Choices Your Rights Handout: Informed and given  Was more information offered? Materials given    The Treatment team has appreciated the opportunity to work " with you. If you have any questions or concerns about your recent admission, you can contact the unit which can receive your call 24 hours a day, 7 days a week. They will be able to get in touch with a Provider if needed. The unit number is [phone].

## 2022-12-12 NOTE — PLAN OF CARE
12/12/22 1508   Individualization/Patient Specific Goals   Patient Personal Strengths expressive of needs;motivated for recovery;stable living environment   Patient Vulnerabilities substance abuse/addiction;lacks insight into illness;poor physical health   Anxieties, Fears or Concerns Wants to go home   Individualized Care Needs None   Patient/Family-Specific Goals (Include Timeframe) Wants to sleep in her bed, resume therapy servives   Interprofessional Rounds   Summary Reason for admission   Participants nursing;CTC;psychiatrist   Behavioral Team Discussion   Participants Dr. Hallman, Ivory Strickland RN, Linda Bustos CTC   Progress New Patient   Anticipated length of stay 3-5 days   Continued Stay Criteria/Rationale SA. Needs clinical stabilization and medication management   Medical/Physical No acute cocnerns   Precautions See below   Plan Psychiatry Team will meet with patient daily to assess psychiatric needs and to discuss medication options/side effects; during hospitalization patient will be encouraged to attend therapy groups and to participate in unit programming. CTC will coordinate disposition and aftercare plan   Rationale for change in precautions or plan None   Safety Plan See below   Anticipated Discharge Disposition home with family   PRECAUTIONS AND SAFETY    Behavioral Orders   Procedures    Code 1 - Restrict to Unit    Routine Programming     As clinically indicated    Status 15     Every 15 minutes.    Suicide precautions     Patients on Suicide Precautions should have a Combination Diet ordered that includes a Diet selection(s) AND a Behavioral Tray selection for Safe Tray - with utensils, or Safe Tray - NO utensils      Withdrawal precautions       Safety  Safety WDL: WDL  Patient Location: patient room, own  Observed Behavior: sleeping  Safety Measures: safety rounds completed  Suicidality: Status 15

## 2022-12-12 NOTE — PLAN OF CARE
"[facility] Occupational Therapy Group Intervention Note     12/12/22 1102   Group Therapy Session   Group Attendance attended group session   Time Session Began 1015   Time Session Ended 1100   Total Time (minutes) 45   Total # Attendees 3   Group Type psychoeducation   Group Topic Covered coping skills/lifestyle management;relaxation techniques   Group Session Detail Chair Yoga. Purpose: Occupation-based coping skill exploration group through mindful movement to facilitate stress management, awakening and/or relaxation. Education was provided on the use of stretching, exercise, and meditation practice as a coping tool within daily/weekly routine.    Patient Response/Contribution cooperative with task;organized   Patient Participation Detail Independently followed and engaged in all of group. Verbalized feeling positive results from yoga pracitce as stated it was \"very relaxing\". Expressed interest in additional mind-body interventions.      Carol Giles OT on 12/12/2022 at 11:03 AM      "

## 2022-12-12 NOTE — PLAN OF CARE
Patient appeared to be sleeping throughout the night, no complained or request for PRN this shift, patient slept for 7 hours, continue plan of care.  Problem: Sleep Disturbance  Goal: Adequate Sleep/Rest  Outcome: Progressing   Goal Outcome Evaluation:

## 2022-12-12 NOTE — PLAN OF CARE
Patient reported that she had a good night sleep, she stated that she's sad and denies feeling anxious and depress. She denies SI,SIB and HI, affect is flat but brightens on approach, generalized body aches stating  this is chronic, PRN Ibuprofen 600 mg given with relief. Patient participated in morning group and is seen walking on unit frequently. Upon assessment, patient noted to have good insight to her health, she acknowledged that she did not make the best decision by falling back on alcohol, she verbalized not wanting to get into this situation again and decided to blocked people who are contributing the increase stress. Patient stated her daughter is her support, she mention how relief she felt being at a safe environment. Patient appreciate the help but stated she will like to return home in a more calmer and quiet environment. She had adequate intake of food and fluids, has been out and visible in Milieu but isolative to self. Continue plan of care.       Problem: Anxiety  Goal: Anxiety Reduction or Resolution  Outcome: Progressing     Problem: Depression  Goal: Improved Mood  Outcome: Progressing     Problem: Suicidal Behavior  Goal: Suicidal Behavior is Absent or Managed  Outcome: Progressing   Goal Outcome Evaluation:

## 2022-12-13 VITALS
BODY MASS INDEX: 21.88 KG/M2 | WEIGHT: 131.3 LBS | DIASTOLIC BLOOD PRESSURE: 73 MMHG | RESPIRATION RATE: 16 BRPM | OXYGEN SATURATION: 99 % | TEMPERATURE: 98.5 F | HEART RATE: 70 BPM | SYSTOLIC BLOOD PRESSURE: 124 MMHG | HEIGHT: 65 IN

## 2022-12-13 PROCEDURE — G0177 OPPS/PHP; TRAIN & EDUC SERV: HCPCS

## 2022-12-13 PROCEDURE — 99238 HOSP IP/OBS DSCHRG MGMT 30/<: CPT | Performed by: PSYCHIATRY & NEUROLOGY

## 2022-12-13 PROCEDURE — 250N000013 HC RX MED GY IP 250 OP 250 PS 637: Performed by: PSYCHIATRY & NEUROLOGY

## 2022-12-13 RX ADMIN — DULOXETINE HYDROCHLORIDE 30 MG: 30 CAPSULE, DELAYED RELEASE ORAL at 08:03

## 2022-12-13 RX ADMIN — IBUPROFEN 600 MG: 200 TABLET, FILM COATED ORAL at 10:04

## 2022-12-13 RX ADMIN — FOLIC ACID 1 MG: 1 TABLET ORAL at 08:03

## 2022-12-13 RX ADMIN — THIAMINE HCL TAB 100 MG 100 MG: 100 TAB at 08:03

## 2022-12-13 RX ADMIN — MULTIPLE VITAMINS W/ MINERALS TAB 1 TABLET: TAB at 08:03

## 2022-12-13 ASSESSMENT — ACTIVITIES OF DAILY LIVING (ADL)
ORAL_HYGIENE: INDEPENDENT
ADLS_ACUITY_SCORE: 30
ADLS_ACUITY_SCORE: 30
DRESS: SCRUBS (BEHAVIORAL HEALTH);INDEPENDENT
ADLS_ACUITY_SCORE: 30
HYGIENE/GROOMING: INDEPENDENT
ADLS_ACUITY_SCORE: 30
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
## 2022-12-13 NOTE — PLAN OF CARE
Problem: Anxiety  Goal: Anxiety Reduction or Resolution  Outcome: Progressing     Problem: Depression  Goal: Improved Mood  Outcome: Progressing     Problem: Suicidal Behavior  Goal: Suicidal Behavior is Absent or Managed  Outcome: Progressing  Flowsheets (Taken 12/12/2022 1806)  Mutually Determined Action Steps (Suicidal Behavior Absent/Managed): sets future-oriented goal     Problem: Pain Acute  Goal: Optimal Pain Control and Function  Outcome: Progressing  Intervention: Develop Pain Management Plan  Recent Flowsheet Documentation  Taken 12/12/2022 1630 by Good Ascencio RN  Pain Management Interventions: distraction     Problem: Psychotic Signs/Symptoms  Goal: Optimal Cognitive Function (Psychotic Signs/Symptoms)  Intervention: Support and Promote Cognitive Ability  Recent Flowsheet Documentation  Taken 12/12/2022 1630 by Good Ascencio RN  Trust Relationship/Rapport:    care explained    choices provided    emotional support provided    empathic listening provided    questions answered    questions encouraged    reassurance provided    thoughts/feelings acknowledged   Goal Outcome Evaluation:    Plan of Care Reviewed With: patient        Pleasant, cooperative.  Visible in milieu, social and interactive with peers and staff members. Had shower, observed pacing in gates way. Alert and oriented x4, able to communicate needs. She denied any anxiety or depression, denied SI/HI/SIB, contracted for safety. Lower back pain radiating to left hip/thigh, pain managed with distraction and rest, Ibuprofen PRN at HS.

## 2022-12-13 NOTE — PROGRESS NOTES
12/12/22 2100   Group Therapy Session   Group Attendance attended group session   Time Session Began 2000   Time Session Ended 2100   Total Time (minutes) 60   Total # Attendees 4   Group Type expressive therapy   Group Topic Covered emotions/expression   Patient Response/Contribution cooperative with task     Art Therapy directive was to create a drawing with the theme of resiliency.  Pts were encouraged to reflect upon their own resiliency and to describe a challenging situation and to use visualization to imagine themselves recovering and then creating an image of their resilient self.  Pts were also given a positive affirmations handout and were encouraged to add affirmation to their art.     Goals of directive: emotional expression, identifying personal strengths and goals, mindfulness/visualization.     Pt was an engaged participant, focused on task for the majority of group.  Pt finished drawing and briefly shared with author and group. Pt blake a drawing of a detailed heart and shared with group that she is working on practicing self care and shared that she is often a caregiver for others but does not always have self compassion.  Pt's mood was calm, pleasant participant.

## 2022-12-13 NOTE — PLAN OF CARE
Assessment/Intervention/Current Symptoms and Care Coordination: Met with team. Reviewed patient's chart and progress notes.     - Patient will be discharged today. She reports she will continue to meet with her outpatient therapist at least once per week. Patient will continue to see her outpatient PCP for medication follow up.     - Writer scheduled PCP for 12/19 at 2:40 PM at [facility]        Discharge Plan or Goal: Will return home. Will follow up with outpatient providers.          Barriers to Discharge: None              Referral Status:         Legal Status: Voluntary

## 2022-12-13 NOTE — PLAN OF CARE
Patient appeared sleeping during shift without concerns, intermittently repositioned and awake for bathroom use, no prn's given, all safety protocols were in place, will continue to monitor patient.   Problem: Sleep Disturbance  Goal: Adequate Sleep/Rest  Outcome: Progressing   Goal Outcome Evaluation:

## 2022-12-13 NOTE — PLAN OF CARE
[facility] Occupational Therapy Group Intervention Note     12/13/22 1435   Group Therapy Session   Group Attendance attended group sessions x2   Group Type task skill;life skill   Group Topic Covered coping skills/lifestyle management;relapse prevention;structured socialization   Group Session Detail 1 - Mental health management group focused on goal setting for hopefulness and self-development related to meaningful occupations. Education was provided on SMART goals for increased follow-through and success.     2 - Clinic - coping skill exploration, creative expression within personally meaningful activities, and observation of social, cognitive, and kinesthetic performance skills   Patient Response/Contribution cooperative with task;organized;offered helpful suggestions to peers;listened actively   Patient Participation Detail 1 - Demonstrated openness and insight as she recognized current challenges of grief and use of ETOH as an unhealthy means to cope. Connected this with a specific goal to utilize healthy routine-based coping skills for increased MH stability.     2 - IND to gather materials, organize work space, sequence task, and reach task completion within a personal project. Demonstrated fair social engagement.      Carol Giles OT on 12/13/2022 at 2:36 PM

## 2022-12-13 NOTE — PLAN OF CARE
Alert and orientated x 3.  Denies suicidal thoughts and thoughts of self harm. Denies depression but endorses sadness.  Has been calm and cooperative.  Reports feels ready for discharge.  Received written discharge instructions with verbal overview.  Received information on medications, follow up needs and resource numbers for CD support groups and grief support groups. All questions answered, verbalized understanding.  All belongings returned to patient prior to discharge. Aware of need to  take home medications from [facility] in [place].  Discharged at 14:45 to home, daughter provided transportation home.     Problem: Sleep Disturbance  Goal: Adequate Sleep/Rest  Outcome: Met     Problem: Anxiety  Goal: Anxiety Reduction or Resolution  Outcome: Met     Problem: Depression  Goal: Improved Mood  Outcome: Met     Problem: Suicidal Behavior  Goal: Suicidal Behavior is Absent or Managed  Outcome: Met     Problem: Pain Acute  Goal: Optimal Pain Control and Function  Outcome: Met  Intervention: Develop Pain Management Plan  Recent Flowsheet Documentation  Taken 12/13/2022 1000 by Debora Marie RN  Pain Management Interventions: medication (see MAR)     Problem: Psychotic Signs/Symptoms  Goal: Improved Behavioral Control (Psychotic Signs/Symptoms)  Outcome: Met  Goal: Optimal Cognitive Function (Psychotic Signs/Symptoms)  Outcome: Met  Intervention: Support and Promote Cognitive Ability  Recent Flowsheet Documentation  Taken 12/13/2022 1200 by Debora Marie RN  Trust Relationship/Rapport: thoughts/feelings acknowledged  Goal: Increased Participation and Engagement (Psychotic Signs/Symptoms)  Outcome: Met  Goal: Improved Mood Symptoms  Outcome: Met  Goal: Improved Psychomotor Symptoms (Psychotic Signs/Symptoms)  Outcome: Met  Goal: Decreased Sensory Symptoms (Psychotic Signs/Symptoms)  Outcome: Met  Goal: Improved Sleep (Psychotic Signs/Symptoms)  Outcome: Met  Goal: Enhanced Social, Occupational or  "Functional Skills (Psychotic Signs/Symptoms)  Outcome: Met  Intervention: Promote Social, Occupational and Functional Ability  Recent Flowsheet Documentation  Taken 12/13/2022 1200 by Debora Marie RN  Trust Relationship/Rapport: thoughts/feelings acknowledged     Problem: Adult Behavioral Health Plan of Care  Goal: Plan of Care Review  Outcome: Met  Flowsheets (Taken 12/13/2022 1200)  Patient Agreement with Plan of Care: agrees  Goal: Patient-Specific Goal (Individualization)  Description: You can add care plan individualizations to a care plan. Examples of Individualization might be:  \"Parent requests to be called daily at 9am for status\", \"I have a hard time hearing out of my right ear\", or \"Do not touch me to wake me up as it startles me\".  Outcome: Met  Goal: Individualized Daily Interaction Plan (IDIP)  Outcome: Met  Goal: Adheres to Safety Considerations for Self and Others  Outcome: Met  Goal: Absence of New-Onset Illness or Injury  Outcome: Met  Goal: Optimized Coping Skills in Response to Life Stressors  Outcome: Met  Goal: Develops/Participates in Therapeutic Challenge to Support Successful Transition  Outcome: Met  Intervention: Foster Therapeutic Challenge  Recent Flowsheet Documentation  Taken 12/13/2022 1200 by Debora Marie RN  Trust Relationship/Rapport: thoughts/feelings acknowledged   Goal Outcome Evaluation:    Plan of Care Reviewed With: patient                   "

## 2023-02-08 ENCOUNTER — MEDICAL CORRESPONDENCE (OUTPATIENT)
Dept: HEALTH INFORMATION MANAGEMENT | Facility: CLINIC | Age: 53
End: 2023-02-08

## 2023-08-13 ENCOUNTER — HOSPITAL ENCOUNTER (EMERGENCY)
Facility: HOSPITAL | Age: 53
Discharge: OTHER INSTITUTION | End: 2023-08-13
Attending: EMERGENCY MEDICINE | Admitting: EMERGENCY MEDICINE
Payer: COMMERCIAL

## 2023-08-13 ENCOUNTER — HOSPITAL ENCOUNTER (INPATIENT)
Facility: CLINIC | Age: 53
LOS: 2 days | Discharge: HOME | End: 2023-08-15
Attending: PSYCHIATRY & NEUROLOGY | Admitting: PSYCHIATRY & NEUROLOGY
Payer: COMMERCIAL

## 2023-08-13 ENCOUNTER — TELEPHONE (OUTPATIENT)
Dept: BEHAVIORAL HEALTH | Facility: CLINIC | Age: 53
End: 2023-08-13

## 2023-08-13 VITALS
RESPIRATION RATE: 18 BRPM | WEIGHT: 135 LBS | HEIGHT: 65 IN | BODY MASS INDEX: 22.49 KG/M2 | SYSTOLIC BLOOD PRESSURE: 162 MMHG | TEMPERATURE: 98.6 F | OXYGEN SATURATION: 100 % | DIASTOLIC BLOOD PRESSURE: 77 MMHG | HEART RATE: 76 BPM

## 2023-08-13 DIAGNOSIS — F10.920 ALCOHOL INTOXICATION, UNCOMPLICATED (H): ICD-10-CM

## 2023-08-13 DIAGNOSIS — F10.939 ALCOHOL USE WITH WITHDRAWAL (H): ICD-10-CM

## 2023-08-13 DIAGNOSIS — F32.A DEPRESSION, UNSPECIFIED DEPRESSION TYPE: ICD-10-CM

## 2023-08-13 LAB
ALBUMIN SERPL BCG-MCNC: 4.2 G/DL (ref 3.5–5.2)
ALP SERPL-CCNC: 70 U/L (ref 35–104)
ALT SERPL W P-5'-P-CCNC: 20 U/L (ref 0–50)
AMPHETAMINES UR QL SCN: NORMAL
ANION GAP SERPL CALCULATED.3IONS-SCNC: 12 MMOL/L (ref 7–15)
ANION GAP SERPL CALCULATED.3IONS-SCNC: 13 MMOL/L (ref 7–15)
AST SERPL W P-5'-P-CCNC: 26 U/L (ref 0–45)
BARBITURATES UR QL SCN: NORMAL
BASOPHILS # BLD AUTO: 0 10E3/UL (ref 0–0.2)
BASOPHILS NFR BLD AUTO: 1 %
BENZODIAZ UR QL SCN: NORMAL
BILIRUB SERPL-MCNC: 0.2 MG/DL
BUN SERPL-MCNC: 9 MG/DL (ref 6–20)
BUN SERPL-MCNC: 9.1 MG/DL (ref 6–20)
BZE UR QL SCN: NORMAL
CALCIUM SERPL-MCNC: 8.1 MG/DL (ref 8.6–10)
CALCIUM SERPL-MCNC: 8.2 MG/DL (ref 8.6–10)
CANNABINOIDS UR QL SCN: NORMAL
CHLORIDE SERPL-SCNC: 108 MMOL/L (ref 98–107)
CHLORIDE SERPL-SCNC: 108 MMOL/L (ref 98–107)
CREAT SERPL-MCNC: 0.65 MG/DL (ref 0.51–0.95)
CREAT SERPL-MCNC: 0.66 MG/DL (ref 0.51–0.95)
DEPRECATED HCO3 PLAS-SCNC: 25 MMOL/L (ref 22–29)
DEPRECATED HCO3 PLAS-SCNC: 26 MMOL/L (ref 22–29)
EOSINOPHIL # BLD AUTO: 0.1 10E3/UL (ref 0–0.7)
EOSINOPHIL NFR BLD AUTO: 1 %
ERYTHROCYTE [DISTWIDTH] IN BLOOD BY AUTOMATED COUNT: 13.2 % (ref 10–15)
ETHANOL SERPL-MCNC: 0.33 G/DL
GFR SERPL CREATININE-BSD FRML MDRD: >90 ML/MIN/1.73M2
GFR SERPL CREATININE-BSD FRML MDRD: >90 ML/MIN/1.73M2
GLUCOSE SERPL-MCNC: 105 MG/DL (ref 70–99)
GLUCOSE SERPL-MCNC: 108 MG/DL (ref 70–99)
HCT VFR BLD AUTO: 42.3 % (ref 35–47)
HGB BLD-MCNC: 14.6 G/DL (ref 11.7–15.7)
IMM GRANULOCYTES # BLD: 0 10E3/UL
IMM GRANULOCYTES NFR BLD: 0 %
LYMPHOCYTES # BLD AUTO: 3 10E3/UL (ref 0.8–5.3)
LYMPHOCYTES NFR BLD AUTO: 35 %
MCH RBC QN AUTO: 29.9 PG (ref 26.5–33)
MCHC RBC AUTO-ENTMCNC: 34.5 G/DL (ref 31.5–36.5)
MCV RBC AUTO: 87 FL (ref 78–100)
MONOCYTES # BLD AUTO: 0.2 10E3/UL (ref 0–1.3)
MONOCYTES NFR BLD AUTO: 3 %
NEUTROPHILS # BLD AUTO: 5 10E3/UL (ref 1.6–8.3)
NEUTROPHILS NFR BLD AUTO: 60 %
NRBC # BLD AUTO: 0 10E3/UL
NRBC BLD AUTO-RTO: 0 /100
OPIATES UR QL SCN: NORMAL
PCP QUAL URINE (ROCHE): NORMAL
PLATELET # BLD AUTO: 306 10E3/UL (ref 150–450)
POTASSIUM SERPL-SCNC: 3.8 MMOL/L (ref 3.4–5.3)
POTASSIUM SERPL-SCNC: 3.8 MMOL/L (ref 3.4–5.3)
PROT SERPL-MCNC: 6.6 G/DL (ref 6.4–8.3)
RBC # BLD AUTO: 4.88 10E6/UL (ref 3.8–5.2)
SODIUM SERPL-SCNC: 146 MMOL/L (ref 136–145)
SODIUM SERPL-SCNC: 146 MMOL/L (ref 136–145)
WBC # BLD AUTO: 8.4 10E3/UL (ref 4–11)

## 2023-08-13 PROCEDURE — 99285 EMERGENCY DEPT VISIT HI MDM: CPT | Mod: 25

## 2023-08-13 PROCEDURE — 250N000013 HC RX MED GY IP 250 OP 250 PS 637: Performed by: CLINICAL NURSE SPECIALIST

## 2023-08-13 PROCEDURE — 82077 ASSAY SPEC XCP UR&BREATH IA: CPT | Performed by: EMERGENCY MEDICINE

## 2023-08-13 PROCEDURE — 90791 PSYCH DIAGNOSTIC EVALUATION: CPT

## 2023-08-13 PROCEDURE — HZ2ZZZZ DETOXIFICATION SERVICES FOR SUBSTANCE ABUSE TREATMENT: ICD-10-PCS | Performed by: PSYCHIATRY & NEUROLOGY

## 2023-08-13 PROCEDURE — 80053 COMPREHEN METABOLIC PANEL: CPT | Performed by: EMERGENCY MEDICINE

## 2023-08-13 PROCEDURE — 36415 COLL VENOUS BLD VENIPUNCTURE: CPT | Performed by: EMERGENCY MEDICINE

## 2023-08-13 PROCEDURE — 250N000013 HC RX MED GY IP 250 OP 250 PS 637: Performed by: EMERGENCY MEDICINE

## 2023-08-13 PROCEDURE — 80307 DRUG TEST PRSMV CHEM ANLYZR: CPT | Performed by: EMERGENCY MEDICINE

## 2023-08-13 PROCEDURE — 128N000004 HC R&B CD ADULT

## 2023-08-13 PROCEDURE — 85025 COMPLETE CBC W/AUTO DIFF WBC: CPT | Performed by: EMERGENCY MEDICINE

## 2023-08-13 RX ORDER — ATENOLOL 50 MG/1
50 TABLET ORAL DAILY PRN
Status: DISCONTINUED | OUTPATIENT
Start: 2023-08-13 | End: 2023-08-15 | Stop reason: HOSPADM

## 2023-08-13 RX ORDER — FLUTICASONE PROPIONATE 50 MCG
1 SPRAY, SUSPENSION (ML) NASAL DAILY PRN
Status: ON HOLD | COMMUNITY
Start: 2023-02-08 | End: 2023-08-15

## 2023-08-13 RX ORDER — ACETAMINOPHEN 325 MG/1
650 TABLET ORAL EVERY 4 HOURS PRN
Status: CANCELLED | OUTPATIENT
Start: 2023-08-13

## 2023-08-13 RX ORDER — AMOXICILLIN 250 MG
1 CAPSULE ORAL 2 TIMES DAILY PRN
Status: DISCONTINUED | OUTPATIENT
Start: 2023-08-13 | End: 2023-08-15 | Stop reason: HOSPADM

## 2023-08-13 RX ORDER — ACETAMINOPHEN 325 MG/1
975 TABLET ORAL ONCE
Status: COMPLETED | OUTPATIENT
Start: 2023-08-13 | End: 2023-08-13

## 2023-08-13 RX ORDER — MULTIPLE VITAMINS W/ MINERALS TAB 9MG-400MCG
1 TAB ORAL DAILY
Status: DISCONTINUED | OUTPATIENT
Start: 2023-08-13 | End: 2023-08-15 | Stop reason: HOSPADM

## 2023-08-13 RX ORDER — METHOCARBAMOL 750 MG/1
2 TABLET, FILM COATED ORAL EVERY 6 HOURS PRN
COMMUNITY
Start: 2015-08-12 | End: 2024-07-25

## 2023-08-13 RX ORDER — ONDANSETRON 4 MG/1
4 TABLET, ORALLY DISINTEGRATING ORAL EVERY 6 HOURS PRN
Status: DISCONTINUED | OUTPATIENT
Start: 2023-08-13 | End: 2023-08-15 | Stop reason: HOSPADM

## 2023-08-13 RX ORDER — HYDROXYZINE HYDROCHLORIDE 10 MG/1
10-20 TABLET, FILM COATED ORAL EVERY 8 HOURS PRN
COMMUNITY
Start: 2023-02-07

## 2023-08-13 RX ORDER — DULOXETIN HYDROCHLORIDE 30 MG/1
30 CAPSULE, DELAYED RELEASE ORAL DAILY
Status: CANCELLED | OUTPATIENT
Start: 2023-08-13

## 2023-08-13 RX ORDER — ONDANSETRON 4 MG/1
4 TABLET, ORALLY DISINTEGRATING ORAL EVERY 6 HOURS PRN
Status: CANCELLED | OUTPATIENT
Start: 2023-08-13

## 2023-08-13 RX ORDER — MAGNESIUM HYDROXIDE/ALUMINUM HYDROXICE/SIMETHICONE 120; 1200; 1200 MG/30ML; MG/30ML; MG/30ML
30 SUSPENSION ORAL EVERY 4 HOURS PRN
Status: DISCONTINUED | OUTPATIENT
Start: 2023-08-13 | End: 2023-08-15 | Stop reason: HOSPADM

## 2023-08-13 RX ORDER — LOPERAMIDE HCL 2 MG
2 CAPSULE ORAL 4 TIMES DAILY PRN
Status: DISCONTINUED | OUTPATIENT
Start: 2023-08-13 | End: 2023-08-15 | Stop reason: HOSPADM

## 2023-08-13 RX ORDER — LOPERAMIDE HCL 2 MG
2 CAPSULE ORAL 4 TIMES DAILY PRN
Status: CANCELLED | OUTPATIENT
Start: 2023-08-13

## 2023-08-13 RX ORDER — DIAZEPAM 5 MG
5-20 TABLET ORAL EVERY 30 MIN PRN
Status: DISCONTINUED | OUTPATIENT
Start: 2023-08-13 | End: 2023-08-15 | Stop reason: HOSPADM

## 2023-08-13 RX ORDER — TRAZODONE HYDROCHLORIDE 50 MG/1
50 TABLET, FILM COATED ORAL
Status: CANCELLED | OUTPATIENT
Start: 2023-08-13

## 2023-08-13 RX ORDER — TRAZODONE HYDROCHLORIDE 50 MG/1
50 TABLET, FILM COATED ORAL
Status: DISCONTINUED | OUTPATIENT
Start: 2023-08-13 | End: 2023-08-15 | Stop reason: HOSPADM

## 2023-08-13 RX ORDER — ACETAMINOPHEN 325 MG/1
650 TABLET ORAL EVERY 4 HOURS PRN
Status: DISCONTINUED | OUTPATIENT
Start: 2023-08-13 | End: 2023-08-15 | Stop reason: HOSPADM

## 2023-08-13 RX ORDER — FOLIC ACID 1 MG/1
1 TABLET ORAL DAILY
Status: DISCONTINUED | OUTPATIENT
Start: 2023-08-13 | End: 2023-08-15 | Stop reason: HOSPADM

## 2023-08-13 RX ORDER — AMOXICILLIN 250 MG
1 CAPSULE ORAL 2 TIMES DAILY PRN
Status: CANCELLED | OUTPATIENT
Start: 2023-08-13

## 2023-08-13 RX ORDER — DULOXETIN HYDROCHLORIDE 60 MG/1
60 CAPSULE, DELAYED RELEASE ORAL 2 TIMES DAILY
COMMUNITY

## 2023-08-13 RX ORDER — HYDROXYZINE HYDROCHLORIDE 25 MG/1
25 TABLET, FILM COATED ORAL EVERY 4 HOURS PRN
Status: DISCONTINUED | OUTPATIENT
Start: 2023-08-13 | End: 2023-08-15 | Stop reason: HOSPADM

## 2023-08-13 RX ORDER — HYDROXYZINE HYDROCHLORIDE 25 MG/1
25 TABLET, FILM COATED ORAL EVERY 4 HOURS PRN
Status: CANCELLED | OUTPATIENT
Start: 2023-08-13

## 2023-08-13 RX ORDER — HYDROXYZINE PAMOATE 25 MG/1
25 CAPSULE ORAL 3 TIMES DAILY PRN
Status: ON HOLD | COMMUNITY
Start: 2023-04-24 | End: 2023-08-15

## 2023-08-13 RX ORDER — DULOXETIN HYDROCHLORIDE 30 MG/1
30 CAPSULE, DELAYED RELEASE ORAL DAILY
Status: DISCONTINUED | OUTPATIENT
Start: 2023-08-14 | End: 2023-08-14 | Stop reason: DRUGHIGH

## 2023-08-13 RX ORDER — MAGNESIUM HYDROXIDE/ALUMINUM HYDROXICE/SIMETHICONE 120; 1200; 1200 MG/30ML; MG/30ML; MG/30ML
30 SUSPENSION ORAL EVERY 4 HOURS PRN
Status: CANCELLED | OUTPATIENT
Start: 2023-08-13

## 2023-08-13 RX ADMIN — MULTIPLE VITAMINS W/ MINERALS TAB 1 TABLET: TAB at 18:43

## 2023-08-13 RX ADMIN — HYDROXYZINE HYDROCHLORIDE 25 MG: 25 TABLET, FILM COATED ORAL at 22:01

## 2023-08-13 RX ADMIN — DIAZEPAM 10 MG: 5 TABLET ORAL at 18:44

## 2023-08-13 RX ADMIN — THIAMINE HCL TAB 100 MG 100 MG: 100 TAB at 18:43

## 2023-08-13 RX ADMIN — ACETAMINOPHEN 975 MG: 325 TABLET ORAL at 14:23

## 2023-08-13 RX ADMIN — FOLIC ACID 1 MG: 1 TABLET ORAL at 18:43

## 2023-08-13 ASSESSMENT — ACTIVITIES OF DAILY LIVING (ADL)
WALKING_OR_CLIMBING_STAIRS_DIFFICULTY: NO
CHANGE_IN_FUNCTIONAL_STATUS_SINCE_ONSET_OF_CURRENT_ILLNESS/INJURY: NO
DRESSING/BATHING_DIFFICULTY: NO
ORAL_HYGIENE: INDEPENDENT
ADLS_ACUITY_SCORE: 28
CONCENTRATING,_REMEMBERING_OR_MAKING_DECISIONS_DIFFICULTY: NO
ADLS_ACUITY_SCORE: 35
WEAR_GLASSES_OR_BLIND: NO
ADLS_ACUITY_SCORE: 35
DOING_ERRANDS_INDEPENDENTLY_DIFFICULTY: NO
DRESS: SCRUBS (BEHAVIORAL HEALTH)
HYGIENE/GROOMING: INDEPENDENT
FALL_HISTORY_WITHIN_LAST_SIX_MONTHS: NO
ADLS_ACUITY_SCORE: 28
DIFFICULTY_EATING/SWALLOWING: NO
ADLS_ACUITY_SCORE: 28
ADLS_ACUITY_SCORE: 35
TOILETING_ISSUES: NO
ADLS_ACUITY_SCORE: 35
ADLS_ACUITY_SCORE: 35

## 2023-08-13 ASSESSMENT — COLUMBIA-SUICIDE SEVERITY RATING SCALE - C-SSRS
REASONS FOR IDEATION SINCE LAST CONTACT: COMPLETELY TO END OR STOP THE PAIN (YOU COULDN'T GO ON LIVING WITH THE PAIN OR HOW YOU WERE FEELING)
6. HAVE YOU EVER DONE ANYTHING, STARTED TO DO ANYTHING, OR PREPARED TO DO ANYTHING TO END YOUR LIFE?: NO
1. SINCE LAST CONTACT, HAVE YOU WISHED YOU WERE DEAD OR WISHED YOU COULD GO TO SLEEP AND NOT WAKE UP?: YES
TOTAL  NUMBER OF INTERRUPTED ATTEMPTS SINCE LAST CONTACT: NO
ATTEMPT SINCE LAST CONTACT: NO
2. HAVE YOU ACTUALLY HAD ANY THOUGHTS OF KILLING YOURSELF?: NO
SUICIDE, SINCE LAST CONTACT: NO
LETHALITY/MEDICAL DAMAGE CODE MOST LETHAL ACTUAL ATTEMPT: MODERATE PHYSICAL DAMAGE, MEDICAL ATTENTION NEEDED
TOTAL  NUMBER OF ABORTED OR SELF INTERRUPTED ATTEMPTS SINCE LAST CONTACT: NO
MOST LETHAL DATE: 66444

## 2023-08-13 NOTE — ED NOTES
Called [facility] and they said their nurses won't be in for another hour so call back in an hour.

## 2023-08-13 NOTE — ED TRIAGE NOTES
Pt was brought in by [facility] from home for alcohol intoxication. Per EMS pt have been drinking since 1pm last night, pt have been drinking heavily 1.5L of Fireball, called the ambulance because pt wants to stop her alcohol habits.      Triage Assessment       Row Name 08/13/23 0347       Triage Assessment (Adult)    Airway WDL WDL       Respiratory WDL    Respiratory WDL WDL       Cardiac WDL    Cardiac WDL WDL

## 2023-08-13 NOTE — TELEPHONE ENCOUNTER
S: [facility] ED , Provider Sergei calling at 8:08 am with clinical on a 53 year old/Female presenting for alcohol detox.     B: Pt presents for ETOH detox.   Currently reports that she drank last night (a 1.5 liter of etoh). She says she was sober for a year but then has been heavily drinking daily for past year.   Patient reports last use was yesterday at 1 pm  Pt SHAGGY: 0.33  Pt  denies hx of DT  Pt  denies hx of seizures. Last seizure: N/A  Pt endorsing the following symptoms of withdrawal:  easily agitated  MSSA Score: requested she call back with this; MSSA score 6 per call back at 9:06 am    Pt endorses acute mental health or medical concerns. She reports SI. Dec assessed her and they felt she needs detox. No plan or intent. No acute med concerns  Pt denies other drug use: None Amount/frequency: N/A    Does Pt have a detox care plan in [facility]? no  Does pt present with specific needs, assistive devices, or exclusionary criteria? None  Is the patient ambulating, eating and drinking in the ED? Yes, no, yes; requested she call back once pt is eating and she agreed to do this; per call from Sergei at 9:06 am, pt is now eating    A: Pt meets criteria to be presented for IP detox admission. Patient is voluntary    COVID Symptoms: No  If yes, COVID test required   Utox: Not ordered, intake to request lab   CMP: not ordered as they did a BMP: abnormalities: sodium 146, chloride 108, calcium 8.2, glucose 108  CBC: Not ordered, intake requested lab  HCG: N/A     R: Patient cleared and ready for behavioral bed placement: No; pt is not eating; author requested she call back once pt is eating in the ED and she agreed to do this; per call from Sergei at 9:06 am, pt is now eating and is med cleared    Pt is meeting criteria for presentation to 3A/CD     Paged Angelica at 9:15 am.   Angelica called back to Reny at 10:20 am saying she will call back.   Angelica called back to Reny at 11:02 am but author was on another call.  Paged Angelica at 11:14 am.   Per Angelica  at 11:26 am, cbc needed so call her back when this is resulted (wants liver enzymes). Author called ED at 11:33 am and requested this and asked that staff call intake back when this is resulted. They agreed to do this.   Staff called at 12:30 pm saying cbc is resulted.  Paged Asad at 12:33 pm  3a west/straight cd/asad accepted for [facility]; unit informed at 1:07 pm, er informed at 1:10 pm      devyn

## 2023-08-13 NOTE — ED NOTES
Called [place] [facility] and they said they would have to do a screening log with the patient. She is unsure how long until she is able to call back. Left ER phone number with nurse. Also called [facility] and left a VM. Called [facility] and [facility] and there were no answers.

## 2023-08-13 NOTE — ED NOTES
Called [facility] and gave them all the information they needed. They said they will give us a call back if they will accept pt or not. Called [facility] back and they said they do not have a female bed open at this time but to call back at 1030 to see if they have anything available.

## 2023-08-13 NOTE — CARE PLAN
Holly Briseno  August 13, 2023  Plan of Care Hand-off Note     Patient Care Path: (P) discharge    Plan for Care:   Patient is a 53 year old with a history of recurrent depression and alcohol use concerns who relapsed into alcohol use over the past week in the context of several acute on chronic psychosocial stressors. She is approrpiately seeking care and support to avoid reoccurence of suicidal planning or intent as she struggled with in December 2022. Would like to receive a sober and supportive environment to complete a detox, reconnect with therapy ( a new provider), and talk with her established LADC about a SUA. She would like to re-try treatment and is open to completing a substance use assessment at detox, though would prefer to see her usual LADC. We were able to schedule patient with a therapy intake on 8/15/23 (telehealth) with a provider who works with chronic pain, provide patient information on distress tolerance skills, and support discharge to a detox unit for further substance use support.  Patient declined a SUA be scheduled from the ED directly, but reports being open to considering completing this while she is at detox.    Identified Goals and Safety Issues: (P) Patient is reporting ongoing distress and anxiety around grief and loss concerns, feeling discounted and misunderstood by those around her.  Benefits from active and reflective listening skills, validation of emotional experiences and stressors, and reinformcement of her goals to remain sober. Suicidal ideation in the past when intoxicated, recent difficulty remaining sober for more than a few months and at high risk of relapse. Needs assistance with connecting to a detox facility.  If patient is more open in the future, scheduling with an OP SUA would also be a benefit  (currently declining as she prefers to f/u with her OP LADC).    Overview:  (P) Daughter Mehnaz is main contact           Legal Status:  voluntary    Psychiatry  Consult: No, plans to discharge to detox       Updated  provider  regarding plan of care.           Ene Arvizu, LICSW

## 2023-08-13 NOTE — ED PROVIDER NOTES
EMERGENCY DEPARTMENT ENCOUNTER      NAME: Holly Briseno  AGE: 53 year old female  YOB: 1970  MRN: [id number]  EVALUATION DATE & TIME: 8/13/2023  3:44 AM    PCP: No Ref-Primary, Physician    ED PROVIDER: Kristopher Rivas M.D.      Chief Complaint   Patient presents with    Alcohol Intoxication         FINAL IMPRESSION:  1. Alcohol intoxication, uncomplicated (H)    2. Depression, unspecified depression type          ED COURSE & MEDICAL DECISION MAKING:   3:48 AM I met with the patient to gather history and to perform my initial exam. We discussed plans for the ED course, including diagnostic testing and treatment.      53 year old female presents to the Emergency Department for evaluation of alcohol abuse.  Patient reports longstanding alcohol overuse.  She called EMS to her house today after drinking a large amount of alcohol.  She does admit that she feels depressed, says she is trying to drink herself to death.  She is somewhat evasive when asked additional mental health related questions, does not share any other plan for self-harm.  She is mostly interested in trying to get sober.  She does say she would be interested in potential detox referral.  Her blood alcohol level is consistent with her exam at 0.33.  Other basic metabolic labs are stable.  Given her depression and some suicidal statements, I think it would be best to have her sober in the emergency department likely with crisis eval to follow and potentially detox referral if cleared.  Patient was in agreement with this.  She was placed in a continuous monitored room.  She will be signed out to the morning provider for sober reevaluation and probable crisis social work evaluation.      Medical Decision Making    History:  Supplemental history from: Documented in chart, if applicable  External Record(s) reviewed: Documented in chart, if applicable.    Work Up:  Chart documentation includes differential considered and any EKGs or imaging  "independently interpreted by provider, where specified.  In additional to work up documented, I considered the following work up: Documented in chart, if applicable.    External consultation:  Discussion of management with another provider: Documented in chart, if applicable    Complicating factors:  Care impacted by chronic illness: Cancer/Chemotherapy, Chronic Pain, and Mental Health  Care affected by social determinants of health: Alcohol Abuse and/or Recreational Drug Use    Disposition considerations: Admission considered. Patient was signed out to the oncoming physician, disposition pending.      MEDICATIONS GIVEN IN THE EMERGENCY:  Medications - No data to display    NEW PRESCRIPTIONS STARTED AT TODAY'S ER VISIT  New Prescriptions    No medications on file          =================================================================    HPI    Patient information was obtained from: ED Nurse and Patient    Use of : N/A       Holly Briseno is a 53 year old female with a pertinent medical history of alcoholism, alcohol use with withdrawal, MDD, anxiety, malignant melanoma of upper back, who presents to this ED via EMS for evaluation of alcohol intoxication.     Per ED Nurse, patient has been drinking since approximately 1:00 PM yesterday, including approximately 1.5 L of Fireball, and then called EMS because they wanted help quitting alcohol.     Patient endorses the history above. They note that they did have sobriety for a couple of years \"but then I met my  who  30 days after I  him. And his kids don't want to know me. And I just wanna shut it all off. I want to be the me I was before I met him, and I can't find that me.\"     Patient endorses trying to drink herself to death with this recent alcohol use. She denies any other recent substance use outside of alcohol. She endorses a PMH of anxiety and depression, and notes \"I am supposed to be taking my cuckoo meds.\"     Patient denies " "any other complications at this time.    Per Chart Review, patient was seen at the [facility] ED on 22 until 22 for evaluation of worsening depression, alcohol use and suicidal ideation. The patient had gone through a outpatient treatment program a year prior and said she was sober for some time before relapsing several months ago. She reported she drinks a pint of whiskey a day and drank a pint this day. She noted that her   several weeks ago. She had been getting worsening symptoms of depression including dysphoria, weight loss, lack of appetite, difficulty sleeping and more recently feeling suicidal. She did not have a plan other than \"drinking herself to death.\"  She was in the ER here to get help The patient had complained of a headache but I was evaluating her initially.  Head CT was done to rule out a subdural hematoma. The head CT did not show any acute findings. There were no other unusual findings on her lab work, coags, electrolytes, blood counts and urine tox screen. Patient was discharged following a DEC assessment.     Per Chart Review, patient was admitted at the [facility] on 12/10/22 until 22 following a suicide attempt. Patient ended up relapsing on Alcohol Wed 22. She drank a drank of hard alcohol over the course of an evening into the next day.  While she was intoxicated on Thursday AM she ended up overdosing on some Vistirils Prozac and Methocarbamol. She took a couple of handfuls of pills and went to sleep. She states that the overdose was impulsive and not planned ahead. She states that she has not had suicidal thoughts prior to this. She was found by her  who knew she was home and was doing a wellness check. Patient's daughter was worried because patient wasn't answering phone so she requested the wellness check. An ambulance was called and she was brought to the ER. " Patient was admitted and observed. Suicidal attempt was noted and complicating factors included grief over husbands recent death, conflicts with his family and also recent alcohol relapse. The patient consistently regretted overdose and was forward thinking during the admission. She had improvement in her mood and worked with team on aftercare planning. She was started on Cymbalta which she tolerated well. Patient planned to follow up with therapy and also will seek psychiatric follow up through her therapist. She also planned to attend AA or similar. On day of discharge patient was having minimal depression and no suicidal or homicidal thoughts.        REVIEW OF SYSTEMS   All systems reviewed and negative except as noted in HPI.    PAST MEDICAL HISTORY:  Past Medical History:   Diagnosis Date    Alcoholism (H)     Anxiety     Cancer (H) September 2021    Melanoma    Depressive disorder February 3 2021       PAST SURGICAL HISTORY:  Past Surgical History:   Procedure Laterality Date    BIOPSY  September 2021    Melanoma       CURRENT MEDICATIONS:    No current facility-administered medications for this encounter.     Current Outpatient Medications   Medication    DULoxetine (CYMBALTA) 30 MG capsule    folic acid (FOLVITE) 1 MG tablet    thiamine (B-1) 100 MG tablet    VITAMIN A PO    vitamin C with B complex (B COMPLEX-C) tablet    vitamin D3 (CHOLECALCIFEROL) 125 MCG (5000 UT) tablet       ALLERGIES:  Allergies   Allergen Reactions    Amoxicillin Rash     Per the Antimicrobial Stewardship Team 10/19:  No similarities in side chains for Amoxicillin and Ancef, very low to no risk of cross-reactivity.    Sulfa Antibiotics Hives, Itching and Rash       FAMILY HISTORY:  Family History   Problem Relation Age of Onset    Substance Abuse Mother     Substance Abuse Father        SOCIAL HISTORY:   Social History     Socioeconomic History    Marital status:    Tobacco Use    Smoking status: Never    Smokeless tobacco:  "Never   Vaping Use    Vaping Use: Never used   Substance and Sexual Activity    Alcohol use: Not Currently    Drug use: Never    Sexual activity: Not Currently     Partners: Male     Birth control/protection: None     Comment: am recently - had a vasectomy   Other Topics Concern    Parent/sibling w/ CABG, MI or angioplasty before 65F 55M? No       VITALS:  /85   Pulse 76   Temp 98.6  F (37  C) (Oral)   Resp 18   Ht 1.651 m (5' 5\")   Wt 61.2 kg (135 lb)   SpO2 96%   BMI 22.47 kg/m      PHYSICAL EXAM    Constitutional: Disheveled appearing middle-age female patient, laying in bed, no acute distress.  HENT: Normocephalic, Atraumatic. Neck Supple.  Eyes: EOMI, Conjunctiva normal.  Respiratory: Breathing comfortably on room air. Speaks full sentences easily. Lungs clear to ascultation.  Cardiovascular: Normal heart rate, Regular rhythm. No peripheral edema.  Abdomen: Soft  Musculoskeletal: Good range of motion in all major joints. No major deformities noted.  Integument: Warm, Dry.  Neurologic: Alert & awake. Speech is slurred. Normal motor function, Normal sensory function, No focal deficits noted.   Psychiatric: Poor eye contact during exam.  Somewhat slow to answer questions.  Stated mood is depressed.  Patient is tearful during examination.  She reports at times feeling suicidal and she reports that she is chronically trying to drink herself to death.  She denies any other plan or intent.  No homicidal ideation.  No apparent auditory or visual hallucinations.     LAB:  All pertinent labs reviewed and interpreted.  Labs Ordered and Resulted from Time of ED Arrival to Time of ED Departure   BASIC METABOLIC PANEL - Abnormal       Result Value    Sodium 146 (*)     Potassium 3.8      Chloride 108 (*)     Carbon Dioxide (CO2) 26      Anion Gap 12      Urea Nitrogen 9.1      Creatinine 0.65      Calcium 8.2 (*)     Glucose 108 (*)     GFR Estimate >90     ETHYL ALCOHOL LEVEL - Abnormal    Alcohol " ethyl 0.33 (*)        RADIOLOGY:  Reviewed all pertinent imaging. Please see official radiology report.  No orders to display       EKG:    None.    PROCEDURES:   None.       I, Umair Sánchez, am serving as a scribe to document services personally performed by Dr. Kristopher Rivas, based on my observation and the provider's statements to me. I, Kristopher Rivas MD attest that Umair Sánchez is acting in a scribe capacity, has observed my performance of the services and has documented them in accordance with my direction.    Kristopher Rivas M.D.  Emergency Medicine  [facility] EMERGENCY DEPARTMENT  [address] [place] [id number] [phone]  Dept: [phone]       Kristopher Rivas MD  08/13/23 0512

## 2023-08-13 NOTE — ED NOTES
Bed: JNED-02  Expected date: 8/13/23  Expected time: 3:30 AM  Means of arrival: Ambulance  Comments:  [facility]  52 yo F ETOH

## 2023-08-13 NOTE — ED NOTES
[facility] ED Mental Health Handoff Note:     Assuming care from: Dr Simba Rivas    Brief HPI: 53 year old female signed out to me by the above provider. See initial ED Provider note for full details of the presentation.   In brief, patient presents for evaluation of alcohol intoxication.    Per ED nurse, patient had been drinking since 1 PM prior to arrival (about 14 hours). She then called EMS for help with quitting. Patient endorsed this and also reported having sobriety for a few years, but that her   30 days after they were , which caused her to develop suicidal ideation, involving thoughts of drinking to death. She denies other substance abuse and is otherwise at a normal baseline of health.    0709 Patient was signed over to me by above provider. Plan to have a DEC assessment when sober, after which point have her go through detox and be sent home.  7:44 AM  DEC was able to meet with the patient. Patient safe for discharge from a mental health perspective. DEC was able to set up an appointment for 8/15/23. She has a appointment with her own provider on 23. Patient motivated to go to detox. Patient's daughter was contacted and is supportive of the patient going to detox.   7:49 AM   RN will contact detox to see if able to transfer  2:02 PM Patient ultimately accepted by [facility]. Patient discharged for transfer to detox.     Home meds reviewed and ordered/administered: No  Medically stable for inpatient mental health admission: Yes.  Evaluated by mental health: No. Awaiting clinical sobriety before evaluation.  Safety concerns: At the time I received sign out, there were no safety concerns.    Hold Status:  Active Orders   N/A            Labs/Imaging:   Results for orders placed or performed during the hospital encounter of 23 (from the past 24 hour(s))   Basic metabolic panel     Status: Abnormal    Collection Time: 23  4:04 AM   Result Value Ref Range     Sodium 146 (H) 136 - 145 mmol/L    Potassium 3.8 3.4 - 5.3 mmol/L    Chloride 108 (H) 98 - 107 mmol/L    Carbon Dioxide (CO2) 26 22 - 29 mmol/L    Anion Gap 12 7 - 15 mmol/L    Urea Nitrogen 9.1 6.0 - 20.0 mg/dL    Creatinine 0.65 0.51 - 0.95 mg/dL    Calcium 8.2 (L) 8.6 - 10.0 mg/dL    Glucose 108 (H) 70 - 99 mg/dL    GFR Estimate >90 >60 mL/min/1.73m2   Alcohol level blood     Status: Abnormal    Collection Time: 08/13/23  4:04 AM   Result Value Ref Range    Alcohol ethyl 0.33 (HH) <=0.01 g/dL         ED Meds:  Medications - No data to display  No orders to display       ED Course:       There were no significant events during my shift.      Impression:    ICD-10-CM    1. Alcohol intoxication, uncomplicated (H)  F10.920       2. Depression, unspecified depression type  F32.A           Plan:    Awaiting detoxification center transfer.    Tamanna Loyd MD  [facility] EMERGENCY DEPARTMENT  [facility] [facility] [id number]  [phone]                     Tamanna Loyd MD  08/13/23 0963

## 2023-08-13 NOTE — ED NOTES
Pt states that she feels sad and depressed over the death of her , thus making her to drink heavily again, it's been over a year now that she's been sober. She states that her stepchildren wants nothing to do with her. She wants to be the person who she wants to be before she met her . She then states that she wish to be dead by drinking herself to death. Dr. Rivas informed: to put pt on video monitoring until assessed by DEC. Charge RN informed.

## 2023-08-13 NOTE — DISCHARGE INSTRUCTIONS
Aftercare Plan  If I am feeling unsafe or I am in a crisis, I will:   Contact my established care providers   Call the National Suicide Prevention Lifeline: 988  Go to the nearest emergency room   Call 911     Warning signs that I or other people might notice when a crisis is developing for me: worsening thoughts of not wanting to be alive, feeling isolated or cut off from other people, relapse into drinking    Things I am able to do on my own to cope or help me feel better: Read, go for walks, listen to music, essential oils, take a bath, prayer, reading bible.     Things that I am able to do with others to cope or help me better: Talk with family (mom, son, daughter), attend Worship services     Things I can use or do for distraction: Watch tv, sensory walks, distress tolerance skills (see below)     Changes I can make to support my mental health and wellness: Reconnect with therapy (scheduled with an intake on 8/15/23-see below-if still in detox may wish to reschedule the appointment), complete a substance use assessment with either detox staff or my [facility] (scheduled to see on 8/18/23), continue to reach out to rheumatologists office for possible cancellations or last minute openings    People in my life that I can ask for help: MICHAEL Hernandez, mom, daughter, son     Your [facility] has a mental health crisis team you can call 24/7: [facility]  [phone]    Other things that are important when I'm in crisis: It is important for me to stay alive and sober for my daughter, my son, and my mom. I have hope that with treatment and help I will enjoy life again.     Additional resources and information: Teletherapy appointment scheduled with provider who works with chronic pain concerns:    Date: Tuesday, 8/15/2023  Time: 8:00 am - 9:00 am  Provider: Geovanna YEH  Location: [facility], [address] [place] [address], [address], [place] [address]  Phone: [phone]  [phone]  Type: Teletherapy    You re at your emotional breaking point. Maybe the worst has happened, or maybe it was just the  last straw . The DBT distress tolerance skill you need is TIPP. This skill is designed to bring you down from the metaphorical (hopefully not literal) ledge.    TIPP stands for Temperature, Intense exercise, Paced breathing, and Paired muscle relaxation.    Temperature  When we re upset, our bodies often feel hot. To counter this, splash your face with cold water, hold an ice cube, or let the car s AC blow on your face. Changing your body temperature will help you cool down--both physically and emotionally.    Intense Exercise  Do intense exercise to match your intense emotion. You re not a marathon runner? That s okay, you don t need to be. Sprint down to the end of the street, jump in the pool for a few laps, or do jumping jacks until you ve tired yourself out. Increasing oxygen flow helps decrease stress levels. Plus, it s hard to stay dangerously upset when you re exhausted.    Paced Breathing  Even something as simple as controlling your breath can have a profound impact on reducing emotional pain. There are many different types of breathing exercises. If you have a favorite, breathe it out. If you don t, try a technique called  box breathing . Each breath interval will be four seconds long. Take in air four seconds, hold it in four seconds, breathe out four, and hold four. And then start again. Continue to focus on this breathing pattern until you feel more calm. Steady breathing reduces your body s fight or flight response.    Paired Muscle Relaxation  The science of paired muscle relaxation is fascinating. When you tighten a voluntary muscle, relax it, and allow it to rest, the muscle will become more relaxed than it was before it was tightened. Relaxed muscles require less oxygen, so your breathing and heart rate will slow down.1    Try this technique by focusing on a group of  muscles, such as the muscles in your arms. Tighten the muscles as much as you can for five seconds. Then let go of the tension. Let the muscles relax, and you ll begin to relax, as well.    TIPP  Temperature  Intense Exercise  Paced Breathing  Paired Muscle Relaxation    The distress tolerance skills in TIPP will bring you a step closer to wise mind, where you will be able to make a constructive choice and cope productively.    ACCEPTS      The DBT distress tolerance acronym ACCEPTS is a group of skills to help you tolerate a negative emotion until you are able to address and eventually resolve the situation. In an early season of the 90 s sitcom Friends, Ellie is dating Ze Rowell. He calls her from out of town and says,  We need to talk.  Ellie wonders if  it is a good talk, or a bad talk? She is in psychological distress waiting for his return. The skill set she would use while waiting for Ze to come home is ACCEPTS.    This DBT skill stands for Activities, Contributing, Comparisons, Emotions, Push away, Thoughts, and Sensation. These techniques are designed to keep your emotions manageable until you can resolve the problem.    Activities  Engage in an activity, and this can be just about any healthy activity. Read a book, make strawberry jam, go for a walk, call your friend, wash the dishes. Anything that keeps you busy and keeps your mind off the negative emotion will help. If you finish, move on to a new activity. (You could potentially have a very productive day while awaiting that dreaded situation!)    Contributing  Do something kind for another person. Giving service can help you relieve emotional distress in a couple ways. An act of service is also an activity that, as mentioned above, will help get your mind off of the problem at hand. Additionally, we feel good about ourselves when we help someone else, and that in itself can help you deal with stress. Help cook dinner, mow the neighbor s lawn, or  bake cookies for a friend or relative. Each of these contributing ideas will distract you from your current situation.    Comparisons  Put your life in perspective. Is there a time when you ve faced more difficult challenges than you re facing today? Maybe not--maybe this is the most intense situation and most intense emotion you ve ever experienced. (If so, you may need to jump back up to the TIPP section.) If that s the case, is there another person who has suffered more than you? Are you in your safe home, while in another part of the world someone else is searching for food and shelter after a natural disaster? The goal of this exercise is not to add more distress and emotional pain to your current situation. Instead, use this skill to add a different perspective to what you re experiencing right now.    Emotions  You have the power to invoke the opposite emotion of your current distressed feeling. If you are feeling anxious, practice meditation for 15 minutes. If you re feeling depressed, go ahead and Google Image search  adorable puppies . (If you re in need of a real laugh, search  ugly puppies .) Adding a dose of the opposite emotion helps reduce the intensity of the negative emotion.    Push Away  When you can t deal with something just yet, it s okay to push the problem out of your mind temporarily. You can push away by distracting yourself with other activities, thoughts, or mindfulness. You can even set a time to come back to the issue. You know that it will be addressed, and you can relax in the interim.    Thoughts  Replace negative, anxious thoughts with activities that busy your mind, such as saying the alphabet backwards or doing a Sudoku puzzle. These distractions can help you avoid self-destructive behavior until you re able to achieve emotion regulation.    Sensation  Use your five senses to self-soothe during times of distress. A self-soothing behavior could be taking a warm bath with a lavender  bath bomb and relaxing music, eating a comforting snack, or watching your favorite show. Anything that appeals to your senses can help you cope with the present situation.    ACCEPTS  Activities  Contributing  Comparisons  Emotions  Push Away  Thoughts  Sensation    The dialectical behavior therapy skills in ACCEPTS help you tolerate your distress until the appropriate time to resolve the situation. Once you re ready and able to address the problem head on, other skills, such as DBT interpersonal effectiveness, can help you get your needs met.    IMPROVE      Whether the circumstance is small (you just broke your shoe) or big (you just broke your foot), there will be many times that you don t have control over an unpleasant event. During these times, you ll need distress tolerance to make it through the situation without engaging in unhealthy behaviors. Intense emotions don t last forever. You can use the dialectical behavior skill IMPROVE to tolerate emotions until the intensity subsides.    IMPROVE stands for Imagery, Meaning, Prayer, Relaxation, One thing in the moment, Vacation, and Encouragement.    Imagery  Imagine yourself dealing successfully with the problem, being in wise mind, and feeling accomplished when the situation is over. By doing so, you may actually be able to change the outcome of the problem in your favor2.    Meaning  Try to find meaning in painful situations. What can you learn from this experience? Maybe you ll be more empathetic. Maybe you ll build new relationships. Maybe this will launch you on a healing journey. Find a reason, or a possible reason, to assign your present suffering.    Prayer  Prayer can come in any form that works for you. The prayer can be to any higher power, including God or the universe. Surrender your problems and ask to tolerate the situation a little longer.    Relaxation  We tense up during stressful situations thanks to our fight or flight instinct. Engage in  relaxing activities to calm the psychological distress you re experiencing. These activities can include deep breathing, yoga, a hot bath, and a relaxing walk.    One thing in the moment  Stay in the moment by letting go of the past and future. Adding old issues to the situation, or future tripping about potential consequences of the situation, will not be helpful in solving the problem. Find one thing to do and focus your entire self to that task. A one-track mind helps emotions feel less overwhelming.    Vacation  On the ideal vacation, you re able to take a break from all your stressors and return home ready to take on the challenges you left behind. Unfortunately, most of us aren t able to take a real vacation during a moment of crisis. Instead, you can take a vacation in your mind. Imagine yourself somewhere else, like taking an evening stroll around the lake or driving on the [place] [address]. Stay on your  vacation  as long as necessary, and revisit as often as needed. Hopefully, you ll  return  better able to tolerate your circumstances.    Encouragement  Encouragement doesn t have to come from an external source to be effective. Give yourself encouragement by repeating phrases that are meaningful to you, such as  I got this ,  I can improve this moment , or   sí se puede! . Say it loud, say it proud! You ll be amazed at your ability to motivate yourself to make it through a challenging time.    IMPROVE  Imagery  Meaning  Prayer  Relaxation  One thing in the moment  Vacation  Encouragement    The distress tolerance techniques found in IMPROVE can be used anywhere, and anytime you need to tolerate a situation that you can t change. Practice these techniques during minor situations and they ll come to you naturally when bigger problems arise.    PRO AND CON LIST      Making sensible decisions can be difficult, especially when you re not in Wise Mind. Dialectical behavior therapy suggests using a pro and  con list to weigh out the consequences of your decision.    It s common to have urges to engage in self-harm behavior or other self-destructive behavior while in emotional crisis. Make a pro and con list to decide if you should act on an urge, or tolerate an urge. It can be as simple as a few bullet points in your mind or you can dig deep and make a lengthy list on paper.    In this moment, which behavior is best for you? This skill can be useful in fighting impulsive urges and their negative outcomes.    SELF SOOTHE      Another simple way to increase your distress tolerance in a crisis situation is to use your body s senses. Self-soothing through senses can quickly reduce the intensity of negative emotions.    Sight  Use your vision to focus on something else. Count how many places you can see a certain color in the room, or focus on an object s texture. You can also pull out your phone and scroll through some of your favorite photos.    Hearing  Listen to sounds--any sounds. Can you hear birds chirping or the sound of traffic outside? Turn the volume up on your favorite song and just listen. If you prefer soothing sounds, there are many apps you can install on your phone to play on the go.    Taste  A small treat can give you something pleasurable to focus on while you re getting through a tough moment. You don t need to prepare a full meal--a piece of gum or a few mints will do the trick.    Touch  Embrace your sense of touch by noticing how a pen feels in your hand, running your fingers through your hair, or using a fidget toy. When appropriate, you can wrap yourself in a blanket or take a warm bath.    Smell  Whether it s good or bad, focus on whatever scent is in the air. Can you identify the smell, or break it down into its components? For easy access to a scent you find calming, put a few drops of your favorite essential oil onto a cotton ball and keep it with you in a plastic bag.    Movement  While you  technically only have five senses, DBT introduces a sixth sense of movement. Your emotional state can be altered by your body s movements, so take a walk around the block or dance to your favorite song!    SELF-SOOTHE  Sight  Hearing  Taste  Touch  Smell  Movement    Your senses are a tool you ll always have with you to reduce the intensity of a situation. Try focusing on only one sense at a time to incorporate mindfulness into the self-soothing skill.    RADICAL ACCEPTANCE      Sometimes you will have an undesirable situation that won t change. You may not like it or approve of it, but acceptance will allow you to feel peace and provide you with the space to move on.    Radical acceptance acknowledges that we all have choices, and it sometimes comes down to choosing whether or not we are going to accept the reality of our situation. You can choose to stay miserable about the situation, or you can choose to accept it and move forward.    Imagine that you are terrified of the dentist. You tried to ignore it. You tried to deny it. But you know you have a cavity. You had a good relationship with your previous dentist, but he just retired. Your new dentist isn t warm with his patients and seems a little too eager to wield around that sharp, spinning drill.    In an attempt to avoid the dentist and manage the pain you ll probably start cutting out some of your favorite foods that irritate the cavity, such as sugars and cold food. But that s fine, right? You ve been thinking you should eat more fish and veggies, anyway. That works out okay, except when you eat an unknown cavity irritant, or the cavity pain just flares up for no good reason.    By practicing radical acceptance, you choose to accept that you are scared of the dentist, it will be a miserable experience, and the cavity needs to be filled regardless. You can t leave it or else you ll eventually need a root canal, and no one has time for that. (Read: That s  "like, the scariest dental procedure there is.)    So you go to the appointment, embrace for the worst, and 45 minutes later you walk out with a full set of teeth and a renewed commitment to floss.    And let s not forget--when you re in that dentist chair with a suction tube hanging from your lip and the hygienist misting your entire face with water, you can use the IMPROVE skill set to successfully make it out the other side.    DBT s distress tolerance skill training may seem daunting, yet emotion regulation gives you more control over urges to engage in an impulsive behavior. Whether or not you have a mental illness or personality disorder, you can improve your mental health and ability to tolerate distressing situations. A DBT program or DBT therapist can help you further develop these skills.        Crisis Lines  Crisis Text Line  Text 139155  You will be connected with a trained live crisis counselor to provide support.    Por lori, texto  DELBERT a 055721 o texto a 442-AYUDAME en WhatsApp    [facility] [facility] (LGBTQ Youth Crisis Line)  4.082.325.5463  text START to 055-338      Community Resources  Fast Tracker  Linking people to mental health and substance use disorder resources  [employer]ServiceBenchn.org     [place] [facility] Warm Line  Peer to peer support  Monday thru Saturday, 12 pm to 10 pm  067.347.8556 or 7.660.715.7303  Text \"Support\" to 89208    [employer] [place] [employer] (MARBIN)  609.891.3182 or 1.888.MARBIN.HELPS      Mental Health Apps  My3  https://[employer] Mediapp.org/    VirtualHopeBox  https://Bungolow.org/apps/virtual-hope-box/      Additional Information  Today you were seen by a licensed mental health professional through Triage and Transition services, [facility])  for a crisis assessment in the Emergency Department at [facility].  It is recommended that you follow up with your established providers (psychiatrist, mental health therapist, " and/or primary care doctor - as relevant) as soon as possible. Coordinators from [facility] will be calling you in the next 24-48 hours to ensure that you have the resources you need.  You can also contact [facility] coordinators directly at [phone]. You may have been scheduled for or offered an appointment with a mental health provider. [facility] maintains an extensive network of licensed behavioral health providers to connect patients with the services they need.  We do not charge providers a fee to participate in our referral network.  We match patients with providers based on a patient's specific needs, insurance coverage, and location.  Our first effort will be to refer you to a provider within your care system, and will utilize providers outside your care system as needed.

## 2023-08-13 NOTE — PLAN OF CARE
[facility] Admission Note    S = Situation:   Holly Briseno is a 53 year old year old female with alcohol used disorder.  Voluntary admission to [facility] for alcohol withdrawal and detox.    B  = Background:   Substance use history: Alcohol used disorder   Mental health history:  Depression and Anxiety   Medical history: None   Legal history: DWI in 2021   Treatment history: Yes   Living situation:  Apartment with her daughter   Recent life stressors: Legal issues with her late       A  =  Assessment:   During admission interview, pt affect was blunted and flat. A/O. Endorsed anxiety 7/10 and depression 7/10. Denied SI/HI/SIB.  Contracted for safety. Pt stated she drinks 1.5 liter of vodka everyday for the past 6 days. Her last drink was this morning at 2 AM. Patient denied using any other chemical. Denied any history withdrawal seizures and DT. Patient 's BAC: 0.33.   MSSA score of 9 during assessment. 10 mg valium x1 was given for alcohol withdrawal./80 (BP Location: Left arm)   Pulse 65   Temp 97.8  F (36.6  C) (Oral)   Resp 16   SpO2 96%         R =   Request or Recommendation:   Alcohol withdrawal will be monitored and treated using MSSA with valium as needed   Pt will meet with psychiatry, internal medicine, and case management tomorrow.  At the time of admission, pt reports discharge plan is to continue meeting with her Counsellor and attend AA meeting.

## 2023-08-13 NOTE — PROGRESS NOTES
08/13/23 1805   Patient Belongings   Did you bring any home meds/supplements to the hospital?  No   Patient Belongings locker;other (see comments)   Patient Belongings Put in Hospital Secure Location (Security or Locker, etc.) other (see comments)   Belongings Search Yes   Clothing Search Yes   Second Staff Sofiya & Ele   Comment see note     Storage Bin: clothes not appropriate   Medical Room Bin: keys, papers  ADMISSION:  I am responsible for any personal items that are not sent to the safe or pharmacy. [facility] is not responsible for loss, theft or damage of any property in my possession.  Patient Signature _________________________________________ Date/Time _____________________    Staff Signature ___________________________________________ Date/Time _____________________    2nd Staff person, if patient is unable/unwilling to sign    ________________________________________________________ Date/Time _____________________  DISCHARGE:  All personal items have been returned to me.    Patient Signature _________________________________________ Date/Time _____________________    Staff Signature ___________________________________________ Date/Time _____________________

## 2023-08-13 NOTE — CONSULTS
Diagnostic Evaluation Consultation  Crisis Assessment    Patient Name: Holly Briseno  Age:  53 year old  Legal Sex: female  Gender Identity: female  Pronouns: she/her/hers  Race: White  Ethnicity: Not  or   Language: English      Patient was assessed: Virtual: [place]      Patient location: [facility] EMERGENCY DEPARTMENT                             [id number]    Referral Data and Chief Complaint  Holly Briseno presents to the ED by  self. Patient is presenting to the ED for the following concerns: Substance use.   Factors that make the mental health crisis life threatening or complex are:  Acute intoxication with a history of suicide attempt while intoxicated.      Informed Consent and Assessment Methods  Explained the crisis assessment process, including applicable information disclosures and limits to confidentiality, assessed understanding of the process, and obtained consent to proceed with the assessment.  Assessment methods included conducting a formal interview with patient, review of medical records, collaboration with medical staff, and obtaining relevant collateral information from family and community providers when available.  : done     Patient response to interventions: acceptance expressed, verbalizes understanding  Coping skills were attempted to reduce the crisis:  Coping supports and strategies include an [facility] counselor (David Morrow through [facility], sees weekly with next scheduled appointment on 8/18/23 and finds him helpful  we chat ). Has a psychologist named Eugenia (cannot recall the agency name) she had seen for a year, however at the end of July they discontinued services as  she gave me the all clear . Drinks to deal with stress, when sober  I have very close relationship with god . Last did treatment for alcohol tx at [facility] in February of 2021 and did not find that helpful. Hx of ED visit for mental health August 2022,  hospitalization for suicide attempt 2022. Reasons to stay alive include her daughter, son, and mother,  I have a great life   I just want to want life again , hope for future to be better once she gets treatment. Specific daily skills include reading, go for walks, listen to music, essential oils, take a bath, daughter as a support, mom as  support, son as support, attend Alevism, prayer, reading bible.     History of the Crisis   Pt reports she is here as  I relapsed in my drinking and I can t stop . Had been sober  for a couple months  and that relapsing every few months has been a pattern, though she has had longer periods of sobriety.  Pt reports in the last two weeks  my pain has been intolerable again  and  I ve been trying to get help for my chronic pain  and  nobody wants to listen  and  I cannot get comfortable no matter what I do . Hasn t worked in 2.5 years related to chronic pain (is pursuing disability), currently living with her daughter. She has seen doctors and chiropractors and has her care coordinated by her PCP (Dr. Jurado).  She has appt to see rheumatology in 2024 and that  that is morbidly far away . She reports she is on the cancellation list in case there is a sooner opening.  Has also been dealing with the  death of my   ( 2022) whom she was  to for 30 days. This last week she got legal paperwork from her former stepsons related to the sale of the house and that she is due money and  nobody tells me anything  and  they make me feel like I m a piece of shit that I  their dad . Dealing with multiple back and forths with real estate agents and attorneys, feeling that she is being  bullied into signing paperwork .  not one person was big enough to call me  to discuss this paperwork with her directly and it is hard to be kept out of the loop.  She reports feeling upset and distressed by the thought  it was supposed to be my house with my    and she is frustrated with how the family has responded.  This led to a relapse into drinking last week and she has been drinking  a lot  about a 1 liter a day (typically vodka and fireball)- and this has been escalating in frequency and amounts since she restarted drinking. Pt reports she is worried about withdrawal symptoms, and after she attempt suicide while intoxicated 9 months ago (reports she is aware she overdosed, but doesn t recall the attempt itself), she  don t want my daughter to find me dead  and this came to the ED for help with her drinking. States she feels she is trying to drink herself to death at times.  Pt denies current suicidal thinking, intent, planning, preparatory efforts. Reports thoughts of it being easier to be dead at times related to her chronic pain and will at times pray to god to let her fall asleep and not wake up ( have been asking him (god) to let me go to sleep for the past year ), but denies any intent to harm herself or end her life.    Brief Psychosocial History  Family:  , Children yes  Support System:  Children, Parent(s)  Employment Status:  unemployed  Source of Income:  none  Financial Environmental Concerns:  No concerns identified  Current Hobbies:  exercise/fitness, family functions, music, outdoor activities, reading, social media/computer activities, television/movies/videos  Barriers in Personal Life:  emotional concerns    Significant Clinical History  Current Anxiety Symptoms:  anxious  Current Depression/Trauma:  apathy, difficulty concentrating, withdrawl/isolation, crying or feels like crying, low self esteem, irritable, sadness, thoughts of death/suicide  Current Somatic Symptoms:     Current Psychosis/Thought Disturbance:     Current Eating Symptoms:     Chemical Use History:  Alcohol: Daily  Last Use:: 08/12/23  Benzodiazepines: None  Opiates: None  Cocaine: None  Marijuana: None  Other Use: None   Past diagnosis:  Substance Use Disorder,  Depression  Family history:  No known history of mental health or chemical health concerns  Past treatment:  Individual therapy, Primary Care, Residential Treatment, Inpatient Hospitalization  Details of most recent treatment:  an [facility] counselor (David Morrow through [facility], sees weekly with next scheduled appointment on 8/18/23 and finds him helpful  we chat ). Has a psychologist named Eugenia (cannot recall the agency name) she had seen for a year, however at the end of July they discontinued services as  she gave me the all clear . Drinks to deal with stress, when sober  I have very close relationship with god . Last did treatment for alcohol tx at [facility] in February of 2021 and did not find that helpful. Hx of ED visit for mental health August 2022, hospitalization for suicide attempt December 2022.  Other relevant history:          Collateral Information  Is there collateral information: Yes     Collateral information name, relationship, phone number:  [phone] (Mehnaz) daughter    What happened today:  Definitely think she needs some help  and that she and her brother have been trying to get her to open up however patient will default to drinking.  Mehnaz reports  she has been struggling lately with something my brother said about how he was raised  and Mehnaz s take on it, from reading the text message from brother to patient, was that her mother  took it the wrong way  which daughter reports has been happening more often lately. Patient has been drinking more over past week,  it started Monday and hasn t really stopped .  Mehnaz has not noticed statements about wanting to die or not be alive, but patient does make statements about wishing the pain would go away.  Reports she  usually does go to the emergency room  and then  leaves treatment places after two days  and that Mehnaz worries patient  will be a recurrent pattern.     What is different about patient's functioning: Recent  increase in drinking after several months of sobriety     Concern about alcohol/drug use:      What do you think the patient needs:      Has patient made comments about wanting to kill themselves/others: no    If d/c is recommended, can they take part in safety/aftercare planning:  yes    Additional collateral information:        Risk Assessment  [place] Suicide Severity Rating Scale Full Clinical Version:  Suicidal Ideation  Q6 Suicide Behavior (Lifetime): yes          [place] Suicide Severity Rating Scale Recent:   Suicidal Ideation (Recent)  Q1 Wished to be Dead (Past Month): yes  Q2 Suicidal Thoughts (Past Month): yes  Q3 Suicidal Thought Method: yes  Q4 Suicidal Intent without Specific Plan: yes  Q5 Suicide Intent with Specific Plan: yes  Within the Past 3 Months?: yes  Level of Risk per Screen: high risk          Environmental or Psychosocial Events: loss of a loved one, challenging interpersonal relationships, ongoing abuse of substances, neither working nor attending school, anniversary of traumatizing events  Protective Factors: Protective Factors: strong bond to family unit, community support, or employment, responsibilities and duties to others, including pets and children, lives in a responsibly safe and stable environment, good treatment engagement, sense of importance of health and wellness, able to access care without barriers, supportive ongoing medical and mental health care relationships, help seeking, cultural, spiritual , or Advent beliefs associated with meaning and value in life, constructive use of leisure time, enjoyable activities, resilience, reality testing ability    Does the patient have thoughts of harming others? Feels Like Hurting Others: no  Previous Attempt to Hurt Others: no  Current presentation: Irritable  Violence Threats in Past 6 Months: no  Current Violence Plan or Thoughts: no  Is the patient engaging in sexually inappropriate behavior?: no  Duty to warn initiated:  no    Is the patient engaging in sexually inappropriate behavior?  no        Mental Status Exam   Affect: Appropriate  Appearance: Appropriate  Attention Span/Concentration: Attentive  Eye Contact: Engaged    Fund of Knowledge: Appropriate   Language /Speech Content: Fluent  Language /Speech Volume: Normal  Language /Speech Rate/Productions: Normal, Articulate  Recent Memory: Intact  Remote Memory: Intact  Mood: Irritable, Depressed  Orientation to Person: Yes   Orientation to Place: Yes  Orientation to Time of Day: Yes  Orientation to Date: Yes     Situation (Do they understand why they are here?): Yes  Psychomotor Behavior: Normal  Thought Content: Clear  Thought Form: Goal Directed, Intact     Mini-Cog Assessment  Number of Words Recalled:    Clock-Drawing Test:     Three Item Recall:    Mini-Cog Total Score:       Medication  Psychotropic medications:   Cymbalta       Current Care Team  Patient Care Team:  No Ref-Primary, Physician as PCP - General David Morrow as Therapist    Diagnosis  Patient Active Problem List   Diagnosis Code    Alcohol use with withdrawal (H) F10.939    Anxiety F41.9    Chronic migraine with aura G43.109    Intermittent palpitations R00.2    Major depressive disorder, recurrent, moderate (H) F33.1    Suicidal ideation R45.851    Alcohol use disorder, moderate, dependence (H) F10.20       Primary Problem This Admission  Active Hospital Problems    Alcohol use disorder, moderate, dependence (H)      Major depressive disorder, recurrent, moderate (H)        Clinical Summary and Substantiation of Recommendations   Patient is a 53 year old with a history of recurrent depression and alcohol use concerns who relapsed into alcohol use over the past week in the context of several acute on chronic psychosocial stressors. She is approrpiately seeking care and support to avoid reoccurence of suicidal planning or intent as she struggled with in December 2022. Would like to receive a sober and  supportive environment to complete a detox, reconnect with therapy ( a new provider), and talk with her established LADC about a SUA. She would like to re-try treatment and is open to completing a substance use assessment at detox, though would prefer to see her usual LADC. We were able to schedule patient with a therapy intake on 8/15/23 (telehealth) with a provider who works with chronic pain, provide patient information on distress tolerance skills, and support discharge to a detox unit for further substance use support.  Patient declined a SUA be scheduled from the ED directly, but reports being open to considering completing this while she is at detox.       Patient coping skills attempted to reduce the crisis:  Coping supports and strategies include an [facility] counselor (David Morrow through [facility], sees weekly with next scheduled appointment on 8/18/23 and finds him helpful  we chat ). Has a psychologist named Eugenia (cannot recall the agency name) she had seen for a year, however at the end of July they discontinued services as  she gave me the all clear . Drinks to deal with stress, when sober  I have very close relationship with god . Last did treatment for alcohol tx at [facility] in February of 2021 and did not find that helpful. Hx of ED visit for mental health August 2022, hospitalization for suicide attempt December 2022. Reasons to stay alive include her daughter, son, and mother,  I have a great life   I just want to want life again , hope for future to be better once she gets treatment. Specific daily skills include reading, go for walks, listen to music, essential oils, take a bath, daughter as a support, mom as  support, son as support, attend Jewish, prayer, reading bible.    Disposition  Recommended disposition: Individual Therapy, Detox, Rule 25/SUE Assessment        Reviewed case and recommendations with attending provider. Attending Name: Dr. Loyd       Attending  concurs with disposition: yes       Patient and/or validated legal guardian concurs with disposition:   yes       Final disposition:  discharge    Legal status on admission:      Assessment Details   Total duration spent on the patient case in minutes: 37 min     CPT code(s) utilized: 64378 - Psychotherapy for Crisis - 60 (30-74*) min    IDALMIS Nolen, Psychotherapist  [facility] - Triage & Transition Services  Callback: [phone]

## 2023-08-14 LAB
ANION GAP SERPL CALCULATED.3IONS-SCNC: 11 MMOL/L (ref 7–15)
BUN SERPL-MCNC: 13.7 MG/DL (ref 6–20)
CALCIUM SERPL-MCNC: 9.3 MG/DL (ref 8.6–10)
CHLORIDE SERPL-SCNC: 98 MMOL/L (ref 98–107)
CREAT SERPL-MCNC: 0.7 MG/DL (ref 0.51–0.95)
DEPRECATED HCO3 PLAS-SCNC: 28 MMOL/L (ref 22–29)
GFR SERPL CREATININE-BSD FRML MDRD: >90 ML/MIN/1.73M2
GLUCOSE SERPL-MCNC: 103 MG/DL (ref 70–99)
POTASSIUM SERPL-SCNC: 3.9 MMOL/L (ref 3.4–5.3)
SODIUM SERPL-SCNC: 137 MMOL/L (ref 136–145)

## 2023-08-14 PROCEDURE — 128N000004 HC R&B CD ADULT

## 2023-08-14 PROCEDURE — 82310 ASSAY OF CALCIUM: CPT | Performed by: PHYSICIAN ASSISTANT

## 2023-08-14 PROCEDURE — 250N000013 HC RX MED GY IP 250 OP 250 PS 637: Performed by: PSYCHIATRY & NEUROLOGY

## 2023-08-14 PROCEDURE — 99223 1ST HOSP IP/OBS HIGH 75: CPT | Mod: AI | Performed by: PSYCHIATRY & NEUROLOGY

## 2023-08-14 PROCEDURE — 250N000013 HC RX MED GY IP 250 OP 250 PS 637: Performed by: PHYSICIAN ASSISTANT

## 2023-08-14 PROCEDURE — H2032 ACTIVITY THERAPY, PER 15 MIN: HCPCS

## 2023-08-14 PROCEDURE — 250N000013 HC RX MED GY IP 250 OP 250 PS 637: Performed by: CLINICAL NURSE SPECIALIST

## 2023-08-14 PROCEDURE — 99232 SBSQ HOSP IP/OBS MODERATE 35: CPT | Performed by: PHYSICIAN ASSISTANT

## 2023-08-14 PROCEDURE — 36415 COLL VENOUS BLD VENIPUNCTURE: CPT | Performed by: PHYSICIAN ASSISTANT

## 2023-08-14 RX ORDER — FOLIC ACID 1 MG/1
1 TABLET ORAL DAILY
Status: DISCONTINUED | OUTPATIENT
Start: 2023-08-14 | End: 2023-08-14

## 2023-08-14 RX ORDER — HYDROXYZINE HYDROCHLORIDE 10 MG/1
10-20 TABLET, FILM COATED ORAL EVERY 8 HOURS PRN
Status: DISCONTINUED | OUTPATIENT
Start: 2023-08-14 | End: 2023-08-14

## 2023-08-14 RX ORDER — IBUPROFEN 600 MG/1
600 TABLET, FILM COATED ORAL EVERY 6 HOURS PRN
Status: DISCONTINUED | OUTPATIENT
Start: 2023-08-14 | End: 2023-08-15 | Stop reason: HOSPADM

## 2023-08-14 RX ORDER — METHOCARBAMOL 750 MG/1
750 TABLET, FILM COATED ORAL EVERY 6 HOURS PRN
Status: DISCONTINUED | OUTPATIENT
Start: 2023-08-14 | End: 2023-08-15 | Stop reason: HOSPADM

## 2023-08-14 RX ORDER — DULOXETIN HYDROCHLORIDE 60 MG/1
60 CAPSULE, DELAYED RELEASE ORAL DAILY
Status: DISCONTINUED | OUTPATIENT
Start: 2023-08-15 | End: 2023-08-15 | Stop reason: HOSPADM

## 2023-08-14 RX ORDER — VITAMIN B COMPLEX
125 TABLET ORAL DAILY
Status: DISCONTINUED | OUTPATIENT
Start: 2023-08-14 | End: 2023-08-15 | Stop reason: HOSPADM

## 2023-08-14 RX ADMIN — MULTIPLE VITAMINS W/ MINERALS TAB 1 TABLET: TAB at 08:52

## 2023-08-14 RX ADMIN — HYDROXYZINE HYDROCHLORIDE 25 MG: 25 TABLET, FILM COATED ORAL at 11:51

## 2023-08-14 RX ADMIN — DULOXETINE HYDROCHLORIDE 30 MG: 30 CAPSULE, DELAYED RELEASE ORAL at 08:52

## 2023-08-14 RX ADMIN — IBUPROFEN 600 MG: 600 TABLET ORAL at 17:56

## 2023-08-14 RX ADMIN — SENNOSIDES AND DOCUSATE SODIUM 1 TABLET: 50; 8.6 TABLET ORAL at 08:57

## 2023-08-14 RX ADMIN — HYDROXYZINE HYDROCHLORIDE 25 MG: 25 TABLET, FILM COATED ORAL at 08:57

## 2023-08-14 RX ADMIN — ACETAMINOPHEN 650 MG: 325 TABLET, FILM COATED ORAL at 20:24

## 2023-08-14 RX ADMIN — Medication 125 MCG: at 12:20

## 2023-08-14 RX ADMIN — HYDROXYZINE HYDROCHLORIDE 25 MG: 25 TABLET, FILM COATED ORAL at 20:25

## 2023-08-14 RX ADMIN — THIAMINE HCL TAB 100 MG 100 MG: 100 TAB at 08:52

## 2023-08-14 RX ADMIN — FOLIC ACID 1 MG: 1 TABLET ORAL at 08:52

## 2023-08-14 RX ADMIN — METHOCARBAMOL 750 MG: 750 TABLET ORAL at 13:43

## 2023-08-14 ASSESSMENT — ACTIVITIES OF DAILY LIVING (ADL)
ADLS_ACUITY_SCORE: 28
HYGIENE/GROOMING: INDEPENDENT
ADLS_ACUITY_SCORE: 28
LAUNDRY: WITH SUPERVISION
ADLS_ACUITY_SCORE: 28
LAUNDRY: WITH SUPERVISION
ORAL_HYGIENE: INDEPENDENT
ADLS_ACUITY_SCORE: 28
DRESS: INDEPENDENT
ADLS_ACUITY_SCORE: 28
HYGIENE/GROOMING: INDEPENDENT
ADLS_ACUITY_SCORE: 28
DRESS: SCRUBS (BEHAVIORAL HEALTH);INDEPENDENT
ADLS_ACUITY_SCORE: 28
ADLS_ACUITY_SCORE: 28
ORAL_HYGIENE: INDEPENDENT
ADLS_ACUITY_SCORE: 28
ADLS_ACUITY_SCORE: 28

## 2023-08-14 NOTE — PROGRESS NOTES
Triage & Transition Services, [address]     Holly Briseno  August 14, 2023    Insurance: [employer]     Legal Status: Voluntary      SUDs Assessment Status: NA     ROIs on file: None at this time     Living Situation: Was not discussed      Current Providers and Supports:  Pt attends therapy weekly, just completed a medication management appointment and has support of her daughter.    Encounter: The pt stated she does not feel treatment is the right option for her at this time, she has done treatment previously and would like to go home and attend AA. Her daughter is helping her by deleting her social media profiles and supports her recovery. Pt stated she would like resources for grief therapy and support groups surrounding grief. The pt does not want any further assistance at this time.      Collateral: NA     Consulted with Catrachita Duggan on Patient s plan of care.     Current Plan: Discharge home with grief therapy appt and resources      RN updated.    Lyndsey Adler  Triage & Transition Services - Mental Health and Addiction Service Line  [address] - Adult Inpatient Addiction Psychiatry Unit

## 2023-08-14 NOTE — CONSULTS
[facility]  Internal Medicine Consult     Holly Briseno MRN# [id number]   Age: 53 year old YOB: 1970     Date of Admission: 8/13/2023  Date of Consult: 8/14/2023    Primary Care Provider: No Ref-Primary, Physician    Requesting Service: Behavioral Health - Tashia Rosado MD  Reason for Consult: General Medical Evaluation      SUBJECTIVE   CC:   Chronic dizziness    Assessment and Plan/Recommendations:   Holly Briseno is a 53 year old female with history of etoh abuse, depression, anxiety, and chronic pain who was admitted to station 3A with etoh withdrawal    Alcohol dependency with acute withdrawal, depression, anxiety: Drinking 1.5 liter of vodka daily PTA. Increased depression  - Management per psych    Chronic dizziness: Present for several years. Worse with positional changes. Room spins/wobbles. No associated falls or syncope. Was following with neurology for quite sometime and has recently been referred to rheumatology for possible connective tissue disorder. Doing OP PT which has been mildly helpful  - Continue slow position movement, caution with sitting to standing  - Continue OP PT  - Follow up with rheumatology 2/2024 as planned    Chronic pain: PTA on Robaxin. Will defer use of muscle relaxer while in withdrawal/receiving benzodiazepines to the psychiatry team. Ok to use from a medical perspective     H/o melanoma: Dx 9/2021. Reports she saw dermatology a few weeks ago and can follow up yearly at this time  - Follow up with dermatology through [facility] as planned/indicated     Labile BP: BP 120s-150s/70s-80s in the setting of acute withdrawal. Most recent BP normal at 124/84. No intervention required at this time    Hypernatremia: Mild. Na 146 in the ED. Likely due to hypovolemia. Repeat BMP pending   ** Addendum: BMP with normalized lytes. Medicine will sign off. Please contact with future questions or concerns       Medicine signing off. Please  "contact if future questions or concerns arise. Thank you for the opportunity to be a part of this patient's care.      Darya Beckwith PA-C  Internal Medicine [facility] Hospitalist  Page job code [phone] (), [phone] or [phone] ([facility] and )  Text paging via [employer] is appreciated  August 14, 2023         HPI:   Holly Briseno is a 53 year old female with history of etoh abuse, depression, anxiety, and chronic pain who was admitted to station 3A with etoh withdrawal    Patient reports her current withdrawal symptoms are \"not horrible.\" She has an ok appetite. She has been able to attend groups. Sleep is moderate. Patient states she has chronic dizziness which has been present for several years. It is worse with positional changes. The room spins/wobbles. She denies a sensation of spinning herself. No associated falls or syncope. Was following with neurology for quite sometime and has recently been referred to rheumatology for possible connective tissue disorder. Doing OP PT which has been mildly helpful. No chest pain, dyspnea, or palpations. No fever or chills. No abdominal pain. Reports chronic MSK pain which is at BL. Follows closely with dermatology and says she was seen by [facility] just a few weeks ago. She was told she can move to yearly follow up at this time.        Past Medical History:     Past Medical History:   Diagnosis Date    Alcoholism (H)     Anxiety     Cancer (H) September 2021    Melanoma    Depressive disorder February 3 2021        Reviewed and updated in [facility].     Past Surgical History:      Past Surgical History:   Procedure Laterality Date    BIOPSY  September 2021    Melanoma         Social History:     Social History     Tobacco Use    Smoking status: Never    Smokeless tobacco: Never   Vaping Use    Vaping Use: Never used   Substance Use Topics    Alcohol use: Not Currently    Drug use: Never        Family History:     Family History   Problem Relation Age of Onset    " Substance Abuse Mother     Substance Abuse Father          Allergies:     Allergies   Allergen Reactions    Amoxicillin Rash     Per the Antimicrobial Stewardship Team 10/19:  No similarities in side chains for Amoxicillin and Ancef, very low to no risk of cross-reactivity.    Sulfa Antibiotics Hives, Itching and Rash         Medications:   Reviewed. Please see MAR     Review of Systems:   10 point ROS of systems including Constitutional, Eyes, Respiratory, Cardiovascular, Gastroenterology, Genitourinary, Integumentary, Muscularskeletal, Psychiatric were all negative except for pertinent positives noted in my HPI.    OBJECTIVE   Physical Exam:   Vitals were reviewed  Blood pressure 124/84, pulse 71, temperature 97.1  F (36.2  C), temperature source Temporal, resp. rate 16, SpO2 98 %.  General: Alert and oriented x3, pleasant   HEENT: Anicteric sclera, MMM  Cardiovascular: RRR, S1S2. No murmur noted  Lungs: CTAB without wheezing or crackles   GI: Abdomen soft, non-tender with bowel sounds present. No guarding or rebound   Vascular: No peripheral edema, distal pulses palpable  Neurologic: No focal deficits, CN II-XII grossly intact  Skin: No jaundice, rashes, or lesions        Data:        Lab Results   Component Value Date     08/13/2023     08/13/2023    Lab Results   Component Value Date    CHLORIDE 108 08/13/2023    CHLORIDE 108 08/13/2023    CHLORIDE 110 08/11/2022    Lab Results   Component Value Date    BUN 9.1 08/13/2023    BUN 9.0 08/13/2023    BUN 6 08/11/2022      Lab Results   Component Value Date    POTASSIUM 3.8 08/13/2023    POTASSIUM 3.8 08/13/2023    POTASSIUM 3.6 08/11/2022    Lab Results   Component Value Date    CO2 26 08/13/2023    CO2 25 08/13/2023    CO2 26 08/11/2022    Lab Results   Component Value Date    CR 0.65 08/13/2023    CR 0.66 08/13/2023        Lab Results   Component Value Date    WBC 8.4 08/13/2023    HGB 14.6 08/13/2023    HCT 42.3 08/13/2023    MCV 87 08/13/2023    PLT  306 08/13/2023     Lab Results   Component Value Date    WBC 8.4 08/13/2023

## 2023-08-14 NOTE — DISCHARGE INSTRUCTIONS
Behavioral Discharge Planning and Instructions  THANK YOU FOR CHOOSING [address]  [phone]    Summary: You were admitted to [facility] on 8/13/2023 for detoxification from Alcohol.  A medical exam was performed that included lab work. You have met with a  and opted to return home with resources for grief support groups and therapy.  Please take care and make your recovery a daily priority, Shere!  It was a pleasure working with you and the entire treatment team here wishes you the very best in your recovery!     Recommendation:  Continue to attend scheduled therapy appointments and follow up with grief therapy and support groups. Continue taking current medications as prescribed.     Disposition: Return home    Main Diagnoses:  Tashia Tim MD   Alcohol use disorder severe  Alcohol withdrawal severe  Major depressive disorder moderate recurrent without psychosis       Major Treatments, Procedures and Findings:  You were treated for Alcohol  detoxification using Valium protocol. As an outpatient you will be prescribed medications, please take this medication as prescribed and follow up with your primary care provider for follow up. You did not complete a chemical dependency assessment. You had labs drawn and those results were reviewed with you. Please take a copy of your lab work with you to your next primary care provider appointment.    Symptoms to Report:  If you experience more anxiety, confusion, sleeplessness, deep sadness or thoughts of suicide, notify your treatment team or notify your primary care provider. IF ANY OF THE SYMPTOMS YOU ARE EXPERIENCING ARE A MEDICAL EMERGENCY CALL 911 IMMEDIATELY.     Lifestyle Adjustment:   Health Action Plan:  1.Create a daily schedule  2. Eat Healthy  3. Plan Enjoyable Sober Activities  4. Use Problem Solving Skills and Deal with Issues as they Arise.   5. Be Physically Active  6. Take your medications as prescribed  7. Get enough  restful sleep  8. Practice Relaxation  9. Spend time with Supportive People  10. No use of alcohol, illegal drugs or addictive medications other than what is currently prescribed.   11.AA, NA Sponsor are excellent resources for support  12. Explore how  you can utilize spirituality in your recovery      Facts about COVID19 at www.cdc.gov/COVID19 and www.[place].gov/covid19    Keeping hands clean is one of the most important steps we can take to avoid getting sick and spreading germs to others.  Please wash your hands frequently and lather with soap for at least 20 seconds!    Follow-up Appointment:   Appointment Date/Time: Thursday August 24 th at 2:30 pm with Primary Care Giver[facility]    Address: [facility]  [place] [address]  Phone [phone]         Date: Thursday, 8/17/2023  Time: 11:00 am - 12:00 pm  Provider: Kelly Alejandra MS  Supervised by: Geovanna Davey MA  [facility]  Location: [employer], [address] [place] [address], [address], [place] [address]  Phone: [phone]  Type: Teletherapy (for grief)     Recovery apps for your phone to locate current in person and zoom recovery meetings  Pink Brantley - meeting kev  AA  - meeting kev  Meeting guide - meeting kev  Quick NA meeting - meeting kev  Michael- has various apps    Resources:  Some AA/NA meetings are being held online however most have returned to in-person or a hybrid combination please check online to verify*  Need Support Now? If you or someone you know is struggling or in crisis, help is available. Call or text 640 or chat DecisionDesk.org  AA meetings search for them at: https://aa-intergroup.org (worldwide meeting listings)  AA meetings for [place] area can be found online at: https://[place].org (click local online meetings listings)  NA meetings for [place] area can be found online at: https://www.nam[place].org  (click  find a meeting)  AA and NA Sponsors are excellent resources for support and you can find one at any support group meeting.   Alcoholics Anonymous (https://aa.org/): for information 24 hours/day  AA Intergroup service office in [place] (http://www.aastpaul.org/) [phone]  AA Intergroup service office in [facility]: [phone]. (http://www.a[place].org/)  Narcotics Anonymous (www.nam[place].org) (849) 341-2200  https://aafairviewriverside.org/meetings  SMART Recovery - self management for addiction recovery:  www.[employer].org  Pathways ~ A Health Crisis Resource & Support Center:  614.699.3515.  https://prescribetoprevent.org/patient-education/videos/  http://www.harmreduction.org  [facility] [phone]  Support Group:  AA/NA and Sponsor/support.  National [place] on [place] Illness (www.[place].rashaad.org): 928.168.6612 or 644-532-8742.  Alcoholics Anonymous (www.alcoholics-anonymous.org): Check your phone book for your local chapter.  Suicide Awareness Voices of Education (SAVE) (www.save.org): 222-393-HEUZ (7849)  National Suicide Prevention Line (www.mentalhealthmn.org): 345-913-FRUB (2054)  Mental Health Consumer/Survivor Network of [place] (www.mhcsn.net): 821.200.9265 or 578-230-3200  Mental Health Association of [place] (www.mentalhealth.org): 805.856.1272 or 471-786-7739   Substance Abuse and Mental Health Services (www.samhsa.gov)  [place] Opioid Prevention Coalition: www.opioidcoalition.org    [place] Recovery Connection (MRC)  [facility] connects people seeking recovery to resources that help foster and sustain long-term recovery.  Whether you are seeking resources for treatment, transportation, housing, job training, education, health care or other pathways to recovery, [facility] is a great place to start.  801.872.2049.  www.[employer].org    Great Pod casts for nutrition and wellness  Listen on Apple Podcasts  [employer]ishing [employer] Nutrition   [employer]    Nutrition       Understand the connection between what you eat and how you feel. Hosted by licensed nutritionists and dietitians from [employer] we share practical, real-life solutions for healthier living through nutrition.     General Medication Instructions:   See your medication sheet(s) for instructions.   Take all medications as prescribed.  Make no changes unless your primary care provider suggests them.   Go to all your primary care provider visits.  Be sure to have all your required lab tests. This way, your medicines can be refilled on time.  Do not use any forms of alcohol.    Please Note:  If you have any questions at anytime after you are discharged please call [facility] detox unit 3[facility] at [phone].  [facility], Behavioral Intake [phone]  Medical Records call [phone]  Outpatient Behavioral Intake call [phone]  LP+ Wait List/Bed Availability call [phone]    Please remember to take all of your behavioral discharge planning and lab paperwork to any follow up appointments, it contains your lab results, diagnosis, medication list and discharge recommendations.      THANK YOU FOR CHOOSING [facility]

## 2023-08-14 NOTE — TELEPHONE ENCOUNTER
7:05 PM unit 3A Charge RN updated that Pt has chosen to dsc instead of going to detox.    Intake will updated worklist and admit board and remove from que.

## 2023-08-14 NOTE — PHARMACY-ADMISSION MEDICATION HISTORY
Pharmacist Admission Medication History    Admission medication history is complete. The information provided in this note is only as accurate as the sources available at the time of the update.    Medication reconciliation/reorder completed by provider prior to medication history? No    Information Source(s): Patient and CareEverywhere/SureScripts via phone    Pertinent Information:   Patient has both hydroxyzine 10 mg tablets and and hydroxyzine 25 mg capsules at home. She alternates which one she uses    Changes made to PTA medication list:  Added: methocarbamol, hydroxyzine, Flonase  Deleted: None  Changed: duloxetine (now 60 mg daily, was 30 daily)    Medication Affordability: unable to assess     Allergies reviewed with patient and updates made in EHR: yes    Medication History Completed By: Gisselle Mcclellan RPH 8/13/2023 8:23 PM    Prior to Admission medications    Medication Sig Last Dose Taking? Auth Provider Long Term End Date   DULoxetine (CYMBALTA) 60 MG capsule Take 60 mg by mouth every evening Past Week Yes Unknown, Entered By History No    fluticasone (FLONASE) 50 MCG/ACT nasal spray Spray 1 spray in nostril daily as needed for allergies More than a month Yes Unknown, Entered By History No    folic acid (FOLVITE) 1 MG tablet Take 1 tablet (1 mg) by mouth daily More than a month Yes Bernardo Hallman MD     hydrOXYzine (ATARAX) 10 MG tablet Take 10-20 mg by mouth every 8 hours as needed for anxiety or other (sleep) Past Week Yes Unknown, Entered By History No    hydrOXYzine (VISTARIL) 25 MG capsule Take 25 mg by mouth 3 times daily as needed for anxiety or other (sleep) Past Week Yes Unknown, Entered By History No    methocarbamol (ROBAXIN) 750 MG tablet Take 1 tablet by mouth every 6 hours as needed for muscle spasms Past Week Yes Unknown, Entered By History No    thiamine (B-1) 100 MG tablet Take 1 tablet (100 mg) by mouth daily More than a month Yes Bernardo Hallman MD     VITAMIN A PO Take 1  capsule by mouth daily More than a month Yes Unknown, Entered By History     vitamin C with B complex (B COMPLEX-C) tablet Take 1 tablet by mouth daily More than a month Yes Reported, Patient     vitamin D3 (CHOLECALCIFEROL) 125 MCG (5000 UT) tablet Take 5,000 Units by mouth daily More than a month Yes Reported, Patient

## 2023-08-14 NOTE — PLAN OF CARE
Problem: Alcohol Withdrawal  Goal: Alcohol Withdrawal Symptom Control  Outcome: Progressing   Goal Outcome Evaluation:     Patient is A&O x 4, flat affect , anxious moods. Good appetite, ate 100% of breakfast. Denies SI/HI/SIB, contracts for safety while on unit.  Pt noticed on unit,interacting with peers in milieu.LBM 3 days ago, passing gas. Senna 1 tab given this am  MSSA 4, No valium given.  /84   Pulse 71   Temp 97.1  F (36.2  C) (Temporal)   Resp 16   SpO2 98%     1200 Assessment:  Patient is  states has been having pain of no origin radiating from mid head to lower extremities for several years, has been taking Robaxin which is ordered and given at 1343 .  MSSA 2, no Valium given.Pt ate 100% at lunch and hydrating adequately.  Pt endorsed anxiety 6 /10 in am and 4/10 at noon. PRN Hydroxyzine 25  mg given twice. Staff will continue to monitor and update changes.   BP (!) 153/83 (BP Location: Right arm, Patient Position: Sitting)   Pulse 67   Temp 97.2  F (36.2  C) (Temporal)   Resp 16   SpO2 100%

## 2023-08-14 NOTE — PLAN OF CARE
Behavioral Team Discussion: (8/14/2023)    Continued Stay Criteria/Rationale: Patient admitted for  Alcohol Use Disorder.  Plan: The following services will be provided to the patient; psychiatric assessment, medication management, therapeutic milieu, individual and group support, and skills groups.   Participants: 3A Provider: Dr. Tashia Rosado MD; 3A RN: Jose D Kessler RN; 3A CM's: Sofya Aburto.  Summary/Recommendation: Providers will assess today for treatment recommendations, discharge planning, and aftercare plans. CM will meet with pt for discharge planning.   Medical/Physical: Patient denies any medical or physical concerns at this time.  Precautions:   Behavioral Orders   Procedures    Code 1 - Restrict to Unit    Routine Programming     As clinically indicated    Status 15     Every 15 minutes.    Withdrawal precautions     Rationale for change in precautions or plan: N/A  Progress: Initial.    ASAM Dimension Scale Ratings:  Dimension 1: 3 Client tolerates and lotus with withdrawal discomfort poorly. Client has severe intoxication, such that the client endangers self or others, or intoxication has not abated with less intensive levels of services. Client displays severe signs and symptoms; or risk of severe, but manageable withdrawal; or withdrawal worsening despite detox at less intensive level.  Dimension 2: 1 Client tolerates and lotus with physical discomfort and is able to get the services that the client needs.  Dimension 3: 2 Client has difficulty with impulse control and lacks coping skills. Client has thoughts of suicide or harm to others without means; however, the thoughts may interfere with participation in some treatment activities. Client has difficulty functioning in significant life areas. Client has moderate symptoms of emotional, behavioral, or cognitive problems. Client is able to participate in most treatment activities.  Dimension 4: 3 Client displays inconsistent compliance,  minimal awareness of either the client's addiction or mental disorder, and is minimally cooperative.  Dimension 5: 3 Client has poor recognition and understanding of relapse and recidivism issues and displays moderately high vulnerability for further substance use or mental health problems. Client has few coping skills and rarely applies coping skills.  Dimension 6: 3 Client is not engaged in structured, meaningful activity and the client's peers, family, significant other, and living environment are unsupportive, or there is significant criminal justice system involvement.

## 2023-08-14 NOTE — PLAN OF CARE
"Pt scored MSSA 1, 2, 1. Pt did not receive medications for withdrawal symptoms this shift per MSSA protocol.   UPDATE: Pt has not scored MSSA 8 or above in over 24 hours. Pt has not received withdrawal medications per MSSA protocol in over 24 hours. Per unit protocol, Pt is now considered out of detox.   Pt reported anxiety as \"always having anxiety\". Pt denied depression. Pt denied SI and SIB. Pt alert. Pt independent with steady gait.   Pt presents as calm, cooperative, pleasant. Pt visible in the milieu - reading, TV, engaged with peers.  Pt was observed to eat 100% of dinner.   Pt complaint with cares. Pt received PRN hydroxyzine 25 mg at 2025 for anxiety.   Latest VS: Blood pressure 134/85, pulse 65, temperature 98.2  F (36.8  C), temperature source Oral, resp. rate 16, SpO2 98 %.   Pt reports chronic pain in head radiating to medial neck, bilateral shoulders, and lower back. Pt declined PRN tylenol and heat pack. Upon reassessment, Pt received PRN ibuprofen 600 mg at 1756 and heat pack. Pt reported effective and received heat pack. Upon reassessment, Pt reported minimally effective and Pt received PRN tylenol 650 mg at 2024 and a heat pack - Pt reported effective and declined further interventions.   Internal medicine placed new order for PRN ibuprofen for pain management.     Problem: Adult Behavioral Health Plan of Care  Goal: Plan of Care Review  Outcome: Progressing  Flowsheets (Taken 8/14/2023 1625)  Patient Agreement with Plan of Care: agrees     Problem: Adult Behavioral Health Plan of Care  Goal: Optimized Coping Skills in Response to Life Stressors  Outcome: Progressing     Problem: Alcohol Withdrawal  Goal: Alcohol Withdrawal Symptom Control  Outcome: Progressing     "

## 2023-08-14 NOTE — H&P
Holly Briseno is a 53 year old female      History was provided by PATEINT who was a fair historian.   CHIEF COMPLAINT:  alcohol    HISTORY OF PRESENT ILLNESS:      Per ED Provider Note dated 8/13/23:       Expand All Collapse All    EMERGENCY DEPARTMENT ENCOUNTER       NAME: Holly Briseno  AGE: 53 year old female  YOB: 1970  MRN: [id number]  EVALUATION DATE & TIME: 8/13/2023  3:44 AM     PCP: No Ref-Primary, Physician     ED PROVIDER: Kristopher Rivas M.D.            Chief Complaint   Patient presents with    Alcohol Intoxication            FINAL IMPRESSION:  1. Alcohol intoxication, uncomplicated (H)    2. Depression, unspecified depression type             ED COURSE & MEDICAL DECISION MAKING:   3:48 AM I met with the patient to gather history and to perform my initial exam. We discussed plans for the ED course, including diagnostic testing and treatment.      53 year old female presents to the Emergency Department for evaluation of alcohol abuse.  Patient reports longstanding alcohol overuse.  She called EMS to her house today after drinking a large amount of alcohol.  She does admit that she feels depressed, says she is trying to drink herself to death.  She is somewhat evasive when asked additional mental health related questions, does not share any other plan for self-harm.  She is mostly interested in trying to get sober.  She does say she would be interested in potential detox referral.  Her blood alcohol level is consistent with her exam at 0.33.  Other basic metabolic labs are stable.  Given her depression and some suicidal statements, I think it would be best to have her sober in the emergency department likely with crisis eval to follow and potentially detox referral if cleared.  Patient was in agreement with this.  She was placed in a continuous monitored room.  She will be signed out to the morning provider for sober reevaluation and probable crisis social work evaluation.              Informed Consent and Assessment Methods  Explained the crisis assessment process, including applicable information disclosures and limits to confidentiality, assessed understanding of the process, and obtained consent to proceed with the assessment.  Assessment methods included conducting a formal interview with patient, review of medical records, collaboration with medical staff, and obtaining relevant collateral information from family and community providers when available.  : done        Patient response to interventions: acceptance expressed, verbalizes understanding  Coping skills were attempted to reduce the crisis:  Coping supports and strategies include an [facility] counselor (David Morrow through [facility], sees weekly with next scheduled appointment on 8/18/23 and finds him helpful  we chat ). Has a psychologist named Eugenia (cannot recall the agency name) she had seen for a year, however at the end of July they discontinued services as  she gave me the all clear . Drinks to deal with stress, when sober  I have very close relationship with god . Last did treatment for alcohol tx at [facility] in February of 2021 and did not find that helpful. Hx of ED visit for mental health August 2022, hospitalization for suicide attempt December 2022. Reasons to stay alive include her daughter, son, and mother,  I have a great life   I just want to want life again , hope for future to be better once she gets treatment. Specific daily skills include reading, go for walks, listen to music, essential oils, take a bath, daughter as a support, mom as  support, son as support, attend Jewish, prayer, reading bible.     History of the Crisis   Pt reports she is here as  I relapsed in my drinking and I can t stop . Had been sober  for a couple months  and that relapsing every few months has been a pattern, though she has had longer periods of sobriety.  Pt reports in the last two weeks  my pain has been  intolerable again  and  I ve been trying to get help for my chronic pain  and  nobody wants to listen  and  I cannot get comfortable no matter what I do . Hasn t worked in 2.5 years related to chronic pain (is pursuing disability), currently living with her daughter. She has seen doctors and chiropractors and has her care coordinated by her PCP (Dr. Jurado).  She has appt to see rheumatology in 2024 and that  that is morbidly far away . She reports she is on the cancellation list in case there is a sooner opening.  Has also been dealing with the  death of my   ( 2022) whom she was  to for 30 days. This last week she got legal paperwork from her former stepsons related to the sale of the house and that she is due money and  nobody tells me anything  and  they make me feel like I m a piece of shit that I  their dad . Dealing with multiple back and forths with real estate agents and attorneys, feeling that she is being  bullied into signing paperwork .  not one person was big enough to call me  to discuss this paperwork with her directly and it is hard to be kept out of the loop.  She reports feeling upset and distressed by the thought  it was supposed to be my house with my   and she is frustrated with how the family has responded.  This led to a relapse into drinking last week and she has been drinking  a lot  about a 1 liter a day (typically vodka and fireball)- and this has been escalating in frequency and amounts since she restarted drinking. Pt reports she is worried about withdrawal symptoms, and after she attempt suicide while intoxicated 9 months ago (reports she is aware she overdosed, but doesn t recall the attempt itself), she  don t want my daughter to find me dead  and this came to the ED for help with her drinking. States she feels she is trying to drink herself to death at times.  Pt denies current suicidal thinking, intent, planning, preparatory efforts.  Reports thoughts of it being easier to be dead at times related to her chronic pain and will at times pray to god to let her fall asleep and not wake up ( have been asking him (god) to let me go to sleep for the past year ), but denies any intent to harm herself or end her life.     Brief Psychosocial History  Family:  , Children yes  Support System:  Children, Parent(s)  Employment Status:  unemployed  Source of Income:  none  Financial Environmental Concerns:  No concerns identified  Current Hobbies:  exercise/fitness, family functions, music, outdoor activities, reading, social media/computer activities, television/movies/videos  Barriers in Personal Life:  emotional concerns         Per my interview with patient:  Pt is a 52 y/o wf who has chronic pain and alcoholism . Pt reports she has numerous stressors,disability  death of her .  Patient has chronic alcoholism.  She was in [facility]  Patient came to the emergency room wanting help for her alcoholism she has been drinking and not able to take care of herself she stopped taking medications.  She was drinking 1/l /day   Patient has been using the following substances: Alcohol  Started at age15 , became a problem at 20s    Withdrawal symptoms include tremors shekaes nausea, sweats     Patient has tolerance, withdrawal, progressive use, loss of control, spending more time and more amount than intended. Patient has made attempts to quit, is experiencing cravings, and reports negative consequences.             alcohol        USE DISORDER - CRITERIA  +admits to taking larger amounts than initially intended  +admits to unsuccessful efforts to cut down or control use  +admits to spending a great deal of time in activities necessary to obtain, use and recover from  +admits to cravings or a strong desire to use   + admits to failure to fulfill obligations at work, school or home  + admits to continued use despite negative  consequences  + admits to giving up important activities to use  +admits to use in situations in which it is physically dangerous        Patient does not have a history of seizures.  Patient does not have a history of delirium tremens.             Denies thoughts of suicide or harming others.      Denies auditory or visual hallucinations.     Patient  does not smokes/vapes nicotine    Patient denied any gambling    Substance Age first use First became regular or problematic Most recent use   Alcohol         Cannabis none     Cocaine NONE       Stimulants NONE       Opioids NONE  Previous methadone therapy:  No  Previous buprenorphine therapy:  No  Previous naltrexone therapy: No     Sedatives NONE       Hallucinogens NONE       Inhalants NONE       Other         OTC drugs NONE       Nicotine           PSYCHIATRIC REVIEW OF SYSTEMS:         Psychiatric Review of Systems:   Depression: depression is better no suicidal thinking   She had initially suicidal ideation in the before she came in but right now she does not does not have any active suicide addition plan or intent.    Vane:       Denies: sleeplessness, increased goal-directed activities, abrupt increase in energy, pressured speech   impulsiveness, racing thoughts, increased goal-directed activities, pressured speech, increase in energy  Vane Feeling euphoric,Distractible,Impulsive,Grandiose,Talking excessively,Have energy without sleeping,Mood swings,Irritability  Psychosis:     Denies: visual hallucinations, auditory hallucinations, paranoia  Anxiety: pt was off cymbalta for her anxiety,anxiety is better now     PTSD: Patient was exposed to a traumatic event    Denies: re-experiencing past trauma, nightmares, increased arousal, avoidance of traumatic stimuli, impaired function.  OCD:   denies obsessions, patient has compulsions checking, counting symmetry, cleaning, skin picking.    ED:   Denies: restriction, binging, purging.                  PSYCHIATRIC  HISTORY     Previous diagnoses: depression       Symptoms in relation to substance use :symptoms present without use present     Past court commitments: none  SIB /SUICIDE ATTEMPTS NONE  Psych Hosp :once 2021  Outpatient Programs :sees a therapist   [facility]   Out pt cd [facility]   Detoxes o  PAST PSYCH MED TRIALS    Cymbalta     SOCIAL HISTORY                                                                           Patient is   Patient has  2 children  patient is unemployed  Patient's housing is live with her daughter   Patient has the following stressors work grief , disability       Family History:   FAMILY HISTORY:   Family History   Problem Relation Age of Onset    Substance Abuse Mother     Substance Abuse Father      Family Mental Health History-  none    Substance Use Problems - present for mother and father have alcoholism             PTA Medications:     Medications Prior to Admission   Medication Sig Dispense Refill Last Dose    DULoxetine (CYMBALTA) 60 MG capsule Take 60 mg by mouth every evening   Past Week    fluticasone (FLONASE) 50 MCG/ACT nasal spray Spray 1 spray in nostril daily as needed for allergies   More than a month    folic acid (FOLVITE) 1 MG tablet Take 1 tablet (1 mg) by mouth daily 30 tablet 0 More than a month    hydrOXYzine (ATARAX) 10 MG tablet Take 10-20 mg by mouth every 8 hours as needed for anxiety or other (sleep)   Past Week    hydrOXYzine (VISTARIL) 25 MG capsule Take 25 mg by mouth 3 times daily as needed for anxiety or other (sleep)   Past Week    methocarbamol (ROBAXIN) 750 MG tablet Take 1 tablet by mouth every 6 hours as needed for muscle spasms   Past Week    thiamine (B-1) 100 MG tablet Take 1 tablet (100 mg) by mouth daily 30 tablet 0 More than a month    VITAMIN A PO Take 1 capsule by mouth daily   More than a month    vitamin C with B complex (B COMPLEX-C) tablet Take 1 tablet by mouth daily   More than a month    vitamin  D3 (CHOLECALCIFEROL) 125 MCG (5000 UT) tablet Take 5,000 Units by mouth daily   More than a month          Allergies:     Allergies   Allergen Reactions    Amoxicillin Rash     Per the Antimicrobial Stewardship Team 10/19:  No similarities in side chains for Amoxicillin and Ancef, very low to no risk of cross-reactivity.    Sulfa Antibiotics Hives, Itching and Rash          Labs:     Recent Results (from the past 48 hour(s))   Basic metabolic panel    Collection Time: 08/13/23  4:04 AM   Result Value Ref Range    Sodium 146 (H) 136 - 145 mmol/L    Potassium 3.8 3.4 - 5.3 mmol/L    Chloride 108 (H) 98 - 107 mmol/L    Carbon Dioxide (CO2) 26 22 - 29 mmol/L    Anion Gap 12 7 - 15 mmol/L    Urea Nitrogen 9.1 6.0 - 20.0 mg/dL    Creatinine 0.65 0.51 - 0.95 mg/dL    Calcium 8.2 (L) 8.6 - 10.0 mg/dL    Glucose 108 (H) 70 - 99 mg/dL    GFR Estimate >90 >60 mL/min/1.73m2   Alcohol level blood    Collection Time: 08/13/23  4:04 AM   Result Value Ref Range    Alcohol ethyl 0.33 (HH) <=0.01 g/dL   Comprehensive metabolic panel    Collection Time: 08/13/23  4:04 AM   Result Value Ref Range    Sodium 146 (H) 136 - 145 mmol/L    Potassium 3.8 3.4 - 5.3 mmol/L    Chloride 108 (H) 98 - 107 mmol/L    Carbon Dioxide (CO2) 25 22 - 29 mmol/L    Anion Gap 13 7 - 15 mmol/L    Urea Nitrogen 9.0 6.0 - 20.0 mg/dL    Creatinine 0.66 0.51 - 0.95 mg/dL    Calcium 8.1 (L) 8.6 - 10.0 mg/dL    Glucose 105 (H) 70 - 99 mg/dL    Alkaline Phosphatase 70 35 - 104 U/L    AST 26 0 - 45 U/L    ALT 20 0 - 50 U/L    Protein Total 6.6 6.4 - 8.3 g/dL    Albumin 4.2 3.5 - 5.2 g/dL    Bilirubin Total 0.2 <=1.2 mg/dL    GFR Estimate >90 >60 mL/min/1.73m2   Drug abuse screen 77 urine (FL, RH, SH)    Collection Time: 08/13/23  8:35 AM   Result Value Ref Range    Amphetamines Urine Screen Negative Screen Negative    Barbituates Urine Screen Negative Screen Negative    Benzodiazepine Urine Screen Negative Screen Negative    Cannabinoids Urine Screen Negative Screen  Negative    Opiates Urine Screen Negative Screen Negative    PCP Urine Screen Negative Screen Negative    Cocaine Urine Screen Negative Screen Negative   CBC with platelets and differential    Collection Time: 08/13/23  8:35 AM   Result Value Ref Range    WBC Count 8.4 4.0 - 11.0 10e3/uL    RBC Count 4.88 3.80 - 5.20 10e6/uL    Hemoglobin 14.6 11.7 - 15.7 g/dL    Hematocrit 42.3 35.0 - 47.0 %    MCV 87 78 - 100 fL    MCH 29.9 26.5 - 33.0 pg    MCHC 34.5 31.5 - 36.5 g/dL    RDW 13.2 10.0 - 15.0 %    Platelet Count 306 150 - 450 10e3/uL    % Neutrophils 60 %    % Lymphocytes 35 %    % Monocytes 3 %    % Eosinophils 1 %    % Basophils 1 %    % Immature Granulocytes 0 %    NRBCs per 100 WBC 0 <1 /100    Absolute Neutrophils 5.0 1.6 - 8.3 10e3/uL    Absolute Lymphocytes 3.0 0.8 - 5.3 10e3/uL    Absolute Monocytes 0.2 0.0 - 1.3 10e3/uL    Absolute Eosinophils 0.1 0.0 - 0.7 10e3/uL    Absolute Basophils 0.0 0.0 - 0.2 10e3/uL    Absolute Immature Granulocytes 0.0 <=0.4 10e3/uL    Absolute NRBCs 0.0 10e3/uL         /84   Pulse 71   Temp 97.1  F (36.2  C) (Temporal)   Resp 16   SpO2 98%   Weight is 0 lbs 0 oz  There is no height or weight on file to calculate BMI.    Physical Exam:     ROS: 10 point ROS neg other than the symptoms noted above in the HPI.            Past Medical History:   PAST MEDICAL HISTORY:   Past Medical History:   Diagnosis Date    Alcoholism (H)     Anxiety     Cancer (H) September 2021    Melanoma    Depressive disorder February 3 2021       PAST SURGICAL HISTORY:   Past Surgical History:   Procedure Laterality Date    BIOPSY  September 2021    Melanoma       -    -           MENTAL STATUS EXAM:      Constitutional: General appearance of patient:  Appearance:  awake, alert, appeared as age stated, adequate groomed and slightly unkempt  Attitude:  cooperative  Eye Contact:  good  Mood:  good  Affect:  congruent   Speech:  clear, coherent normal rate   Psychomotor Behavior:  no evidence of  tardive dyskinesia, dystonia, or tics  Thought Process:  logical, linear and goal oriented  Associations:  no loose associations  Thought Content:  no evidence of psychotic thought and active suicidal ideation present  Denied any active suicidal /homicidation ideation plan intent   Insight:  fair  Judgment:  fair  Oriented to:  time, person, and place  Attention Span and Concentration:  intact  Recent and Remote Memory:  intact  Language:  english with appropriate syntax and vocabulary  Fund of Knowledge: appropriate  Muscle Strength and Tone: normal  Gait and Station: Normal     There are no abnormal or psychotic thoughts, no preoccupations, no overvalued ideas, no rumination, no obsessions, no compulsions, no somatic concerns, no hypochrondriasis, no ideas of reference, and no delusions.  Patient denies homicidal thoughts.   Patient denies suicidal thoughts.  Patient appears to have good judgment and good insight.     Musculoskeletal: Patient shows no abnormalities of motor activity: there is no tremor, no tic, and no dystonia.  There is no apparent muscle atrophy, strength and tone appear normal, and there are no abnormal movements.  Patient has normal gait and stance.    DISCUSSION:         Assessment:       Patient has been unable to stop using drugs in the community due to both physical and psychological symptoms.  Continued use will put the patient at risk for medical and/or psychiatric complications.      Inpatient psychiatric hospitalization is warranted at this time for safety, stabilization, and possible adjustment in medications.       Diagnoses:   Alcohol use disorder severe  Alcohol withdrawal severe  Major depressive disorder moderate recurrent without psychosis          Plan:   Problem list  1#alcohol use disorder severe alcohol withdrawal severe     - MSSA protocol using Valium for management of alcohol withdrawal    - Continue thiamine, folate, and multivitamin daily    MSSA    Eating Disturbances:  ate and enjoyed all of it or not applicable  Tremor: 1 - not visibly apparent but can be felt by the examiner placing his fingertip slightly against the patient's fingertips  Sleep Disturbance: slept through the night or not applicable  Clouding of Sensorium: no evidence  Hallucinations: 0 - none  Quality of Contact: 0 - awareness of examiner and people around him/her  Agitation: 1 - somewhat more than normal activity  Paroxysmal Sweats: 0 - no observed sweating  Temperature: 99.5 or below  Pulse: 1 - 70 to 79  Total MSSA Score: 4  Patient to 10 mg of diazepam since admission  Continue present medications Cymbalta for her depression      - Consider anti-craving medications prior to discharge. Pt willing to review additional information about both naltrexone and Antabuse.  -Alcohol withdrawal nausea prn Zofran as needed for nausea     hydroxyzine 25 mg q4h prn for acute anxiety  Trazodone 50 mg at bedtime prn for sleep disturbances       Patient has been unable to stop using drugs in the community due to both physical and psychological symptoms.  Continued use will put the patient at risk for medical and/or psychiatric complications.    I HAVE REVIEWED LABS WITH PT AND TALKED ABOUT RESULTS WITH PT  I HAVE REVIEWED AND SUMMARIZED OLD RECORDS including his medication reconcilation of his home medications  and PDMP was reviewed  I HAVE SPOKEN WITH RN ABOUT MEDICATIONS AND DETOX SCORES  I HAVE SPOKEN WITH CM ABOUT PTS TREATMENT OPTIONS     Discussed in detail about patient's smoking patient was advised to quit patient was told about the impact of smoking.  Patient's willingness to quit was assessed.  I provided methods and skills for cessation including medication management nicotine gum patch.  Patient did not set a quit date.  Patient is interested in quitting .we discussed pharmacotherapy options .patient agreed to take nicotine gum patch lozenge.  We discussed behavioral change techniques when craving nicotine  including deep breathing drinking glass of water, taking a walk.            Laboratory/Imaging:    Liver Function Studies -   Recent Labs   Lab Test 08/13/23  0404   PROTTOTAL 6.6   ALBUMIN 4.2   BILITOTAL 0.2   ALKPHOS 70   AST 26   ALT 20      Last Comprehensive Metabolic Panel:  Sodium   Date Value Ref Range Status   08/13/2023 146 (H) 136 - 145 mmol/L Final   08/13/2023 146 (H) 136 - 145 mmol/L Final     Potassium   Date Value Ref Range Status   08/13/2023 3.8 3.4 - 5.3 mmol/L Final   08/13/2023 3.8 3.4 - 5.3 mmol/L Final   08/11/2022 3.6 3.5 - 5.0 mmol/L Final     Chloride   Date Value Ref Range Status   08/13/2023 108 (H) 98 - 107 mmol/L Final   08/13/2023 108 (H) 98 - 107 mmol/L Final   08/11/2022 110 (H) 98 - 107 mmol/L Final     Carbon Dioxide (CO2)   Date Value Ref Range Status   08/13/2023 26 22 - 29 mmol/L Final   08/13/2023 25 22 - 29 mmol/L Final   08/11/2022 26 22 - 31 mmol/L Final     Anion Gap   Date Value Ref Range Status   08/13/2023 12 7 - 15 mmol/L Final   08/13/2023 13 7 - 15 mmol/L Final   08/11/2022 11 5 - 18 mmol/L Final     Glucose   Date Value Ref Range Status   08/13/2023 108 (H) 70 - 99 mg/dL Final   08/13/2023 105 (H) 70 - 99 mg/dL Final   08/11/2022 94 70 - 125 mg/dL Final     Urea Nitrogen   Date Value Ref Range Status   08/13/2023 9.1 6.0 - 20.0 mg/dL Final   08/13/2023 9.0 6.0 - 20.0 mg/dL Final   08/11/2022 6 (L) 8 - 22 mg/dL Final     Creatinine   Date Value Ref Range Status   08/13/2023 0.65 0.51 - 0.95 mg/dL Final   08/13/2023 0.66 0.51 - 0.95 mg/dL Final     GFR Estimate   Date Value Ref Range Status   08/13/2023 >90 >60 mL/min/1.73m2 Final   08/13/2023 >90 >60 mL/min/1.73m2 Final     Calcium   Date Value Ref Range Status   08/13/2023 8.2 (L) 8.6 - 10.0 mg/dL Final   08/13/2023 8.1 (L) 8.6 - 10.0 mg/dL Final     Bilirubin Total   Date Value Ref Range Status   08/13/2023 0.2 <=1.2 mg/dL Final     Alkaline Phosphatase   Date Value Ref Range Status   08/13/2023 70 35 - 104 U/L  "Final     ALT   Date Value Ref Range Status   08/13/2023 20 0 - 50 U/L Final     Comment:     Reference intervals for this test were updated on 6/12/2023 to more accurately reflect our healthy population. There may be differences in the flagging of prior results with similar values performed with this method. Interpretation of those prior results can be made in the context of the updated reference intervals.       AST   Date Value Ref Range Status   08/13/2023 26 0 - 45 U/L Final     Comment:     Reference intervals for this test were updated on 6/12/2023 to more accurately reflect our healthy population. There may be differences in the flagging of prior results with similar values performed with this method. Interpretation of those prior results can be made in the context of the updated reference intervals.                   Medical treatment/interventions:  Medical concerns: As above    - Consults: IM consult placed. Appreciate assistance.     Legal Status: Voluntary     Safety Assessment:   Checks: Status 15  Pt has not required locked seclusion or restraints in the past 24 hours to maintain safety, please refer to RN documentation for further details.    The risks, benefits, alternatives and side effects have been discussed and are understood by the patient.       Patient will be treated in therapeutic milieu with appropriate individual and group therapies as described.      Clinical Global Impressions  First:7     Most recent:7     Disposition: Pending clinical stabilization. Pt does  appear interested in COMPLETE DETOX AND DO TRT  Length of stay 3-5 days    Attestation:  Patient has been seen and evaluated by me, Dr Tashia Rosado MD  The patient was counseled on nature of illness and treatment plan/options  Care was coordinated with treatment team          \"Much or all of the text in this note was generated through the use of Dragon Dictate voice to text software. Errors in spelling or words which appear " "to be out of contact are unintentional, may be present due having escaped editing\"     "

## 2023-08-14 NOTE — PLAN OF CARE
Goal Outcome Evaluation:    Problem: Alcohol Withdrawal  Goal: Alcohol Withdrawal Symptom Control  Outcome: Progressing     Patient appeared to sleep for 7.5 hours.    MSSA at midnight was 5; patient endorsed mild sweats and tremors.  BP (!) 156/84   Pulse 66   Temp 98.1  F (36.7  C) (Oral)   Resp 16   SpO2 97%     We'll continue to monitor.

## 2023-08-14 NOTE — PROGRESS NOTES
08/14/23 1800   Group Therapy Session   Group Attendance attended group session   Time Session Began 1645   Time Session Ended 1735   Total Time (minutes) 50   Total # Attendees 8   Group Type recreation   Group Topic Covered coping skills/lifestyle management   Group Session Detail healthy coping skills   Patient Response/Contribution cooperative with task   Patient Participation Detail Pt participated in Therapeutic Recreation group with focus on leisure education and acquisition of knowledge and skills. Pt was fully engaged and cooperative in group recreational intervention; leisure inventory. Pt was focused on the written portion for the first part of group and then contributed to the group discussion following. Pt discussed several recreational interests and positive coping skills that are healthy outlets. Pt mood was calm and was appropriate with interactions.

## 2023-08-15 VITALS
HEART RATE: 66 BPM | TEMPERATURE: 97.2 F | DIASTOLIC BLOOD PRESSURE: 82 MMHG | RESPIRATION RATE: 16 BRPM | SYSTOLIC BLOOD PRESSURE: 132 MMHG | OXYGEN SATURATION: 99 %

## 2023-08-15 PROCEDURE — 250N000013 HC RX MED GY IP 250 OP 250 PS 637: Performed by: CLINICAL NURSE SPECIALIST

## 2023-08-15 PROCEDURE — 99239 HOSP IP/OBS DSCHRG MGMT >30: CPT | Performed by: PSYCHIATRY & NEUROLOGY

## 2023-08-15 PROCEDURE — 250N000013 HC RX MED GY IP 250 OP 250 PS 637: Performed by: PHYSICIAN ASSISTANT

## 2023-08-15 PROCEDURE — 250N000013 HC RX MED GY IP 250 OP 250 PS 637: Performed by: PSYCHIATRY & NEUROLOGY

## 2023-08-15 RX ORDER — FOLIC ACID 1 MG/1
1 TABLET ORAL DAILY
Qty: 30 TABLET | Refills: 0 | Status: SHIPPED | OUTPATIENT
Start: 2023-08-15 | End: 2023-08-31

## 2023-08-15 RX ORDER — LANOLIN ALCOHOL/MO/W.PET/CERES
100 CREAM (GRAM) TOPICAL DAILY
Qty: 30 TABLET | Refills: 0 | Status: SHIPPED | OUTPATIENT
Start: 2023-08-15

## 2023-08-15 RX ORDER — MULTIPLE VITAMINS W/ MINERALS TAB 9MG-400MCG
1 TAB ORAL DAILY
Qty: 30 TABLET | Refills: 0 | Status: ON HOLD | OUTPATIENT
Start: 2023-08-16 | End: 2024-01-06

## 2023-08-15 RX ADMIN — THIAMINE HCL TAB 100 MG 100 MG: 100 TAB at 08:47

## 2023-08-15 RX ADMIN — MULTIPLE VITAMINS W/ MINERALS TAB 1 TABLET: TAB at 08:45

## 2023-08-15 RX ADMIN — METHOCARBAMOL 750 MG: 750 TABLET ORAL at 11:18

## 2023-08-15 RX ADMIN — Medication 125 MCG: at 08:45

## 2023-08-15 RX ADMIN — DULOXETINE HYDROCHLORIDE 60 MG: 60 CAPSULE, DELAYED RELEASE ORAL at 08:44

## 2023-08-15 RX ADMIN — FOLIC ACID 1 MG: 1 TABLET ORAL at 08:45

## 2023-08-15 RX ADMIN — IBUPROFEN 600 MG: 600 TABLET ORAL at 08:45

## 2023-08-15 ASSESSMENT — ACTIVITIES OF DAILY LIVING (ADL)
HYGIENE/GROOMING: INDEPENDENT
ADLS_ACUITY_SCORE: 28

## 2023-08-15 NOTE — PLAN OF CARE
Discharge Summary    Patient calm and cooperative this shift and during discharge process. Patient denied thoughts of SI/HI. Patient reported she felt safe to leave. Discharge instructions and AVS were reviewed with patient and she verbalized understanding. Patient reported she felt safe to leave. Patient was given a 30 days supply of medications and was discharged to home.

## 2023-08-15 NOTE — PROGRESS NOTES
Confirmed plan with pt, pt would like to continue with detox only. Pt signed two ROIs for PCP and therapist, will send discharge paperwork to them per pt request. OP therapy appt was scheduled and entered into discharge paperwork. Pt was given grief support group information by request.

## 2023-08-15 NOTE — PLAN OF CARE
Goal Outcome Evaluation:    Problem: Adult Behavioral Health Plan of Care  Goal: Absence of New-Onset Illness or Injury  Outcome: Progressing     Patient appeared to be sleeping throughout the night. No complaints voiced. She continues to be out of detox for alcohol.    We'll continue to monitor.

## 2023-08-15 NOTE — DISCHARGE SUMMARY
Holly Briseno MRN# [id number]   Age: 53 year old YOB: 1970     Date of Admission:  8/13/2023  Date of Discharge:  8/15/2023  Admitting Physician:  Tashia Rosado MD  Discharge Physician:  Tashia Rosado MD      DISCHARGE  DX  Alcohol use disorder severe    Major depressive disorder moderate recurrent without psychosis           Event Leading to Hospitalization:     See Admission note by admitting provider for patient encounter. for additional details.          Hospital Course:   PATIENT was admitted to [facility] attending  under DR rosado, please review the detailed admit note on    The patient was placed under status 15 (15 minute checks) to ensure patient safety.   MSSA protocol was initiated due to the patient's history of alcohol abuse and concern for withdrawal symptoms.  CBC, BMP and utox obtained.    All outpatient medications were continued    PATIENTdid participate in groups and was visible in the milieu.     The patient's symptoms of withdrawal improved.     Patients energy motivation , sleep appetite improved.  Pt completed detox . It was un eventful.      Discussed with patient medications for craving.  Pt is willing to take  antabuse/naltrexone/campral    Spoke with patient about triggers coping skills relapse prevention.    CONSULTS DONE DURING PATIENTS HOSPITALIZATION.  Patient was seen by medicine on date8/14/23    This as per their medical consult      SUBJECTIVE   CC:   Chronic dizziness    Assessment and Plan/Recommendations:   Holly Briseno is a 53 year old female with history of etoh abuse, depression, anxiety, and chronic pain who was admitted to [facility] with etoh withdrawal     Alcohol dependency with acute withdrawal, depression, anxiety: Drinking 1.5 liter of vodka daily PTA. Increased depression  - Management per psych     Chronic dizziness: Present for several years. Worse with positional changes. Room spins/wobbles. No associated falls or syncope.  Was following with neurology for quite sometime and has recently been referred to rheumatology for possible connective tissue disorder. Doing OP PT which has been mildly helpful  - Continue slow position movement, caution with sitting to standing  - Continue OP PT  - Follow up with rheumatology 2/2024 as planned     Chronic pain: PTA on Robaxin. Will defer use of muscle relaxer while in withdrawal/receiving benzodiazepines to the psychiatry team. Ok to use from a medical perspective      H/o melanoma: Dx 9/2021. Reports she saw dermatology a few weeks ago and can follow up yearly at this time  - Follow up with dermatology through [facility] as planned/indicated      Labile BP: BP 120s-150s/70s-80s in the setting of acute withdrawal. Most recent BP normal at 124/84. No intervention required at this time     Hypernatremia: Mild. Na 146 in the ED. Likely due to hypovolemia. Repeat BMP pending   ** Addendum: BMP with normalized lytes. Medicine will sign off. Please contact with future questions or concerns         Medicine signing off. Please contact if future questions or concerns arise. Thank you for the opportunity to be a part of this patient's care.                        Labs:reviewed with patient       Recent Results (from the past 48 hour(s))   Basic metabolic panel    Collection Time: 08/14/23 12:07 PM   Result Value Ref Range    Sodium 137 136 - 145 mmol/L    Potassium 3.9 3.4 - 5.3 mmol/L    Chloride 98 98 - 107 mmol/L    Carbon Dioxide (CO2) 28 22 - 29 mmol/L    Anion Gap 11 7 - 15 mmol/L    Urea Nitrogen 13.7 6.0 - 20.0 mg/dL    Creatinine 0.70 0.51 - 0.95 mg/dL    Calcium 9.3 8.6 - 10.0 mg/dL    Glucose 103 (H) 70 - 99 mg/dL    GFR Estimate >90 >60 mL/min/1.73m2         Recent Results (from the past 240 hour(s))   Basic metabolic panel    Collection Time: 08/13/23  4:04 AM   Result Value Ref Range    Sodium 146 (H) 136 - 145 mmol/L    Potassium 3.8 3.4 - 5.3 mmol/L    Chloride 108 (H) 98 - 107  mmol/L    Carbon Dioxide (CO2) 26 22 - 29 mmol/L    Anion Gap 12 7 - 15 mmol/L    Urea Nitrogen 9.1 6.0 - 20.0 mg/dL    Creatinine 0.65 0.51 - 0.95 mg/dL    Calcium 8.2 (L) 8.6 - 10.0 mg/dL    Glucose 108 (H) 70 - 99 mg/dL    GFR Estimate >90 >60 mL/min/1.73m2   Alcohol level blood    Collection Time: 08/13/23  4:04 AM   Result Value Ref Range    Alcohol ethyl 0.33 (HH) <=0.01 g/dL   Comprehensive metabolic panel    Collection Time: 08/13/23  4:04 AM   Result Value Ref Range    Sodium 146 (H) 136 - 145 mmol/L    Potassium 3.8 3.4 - 5.3 mmol/L    Chloride 108 (H) 98 - 107 mmol/L    Carbon Dioxide (CO2) 25 22 - 29 mmol/L    Anion Gap 13 7 - 15 mmol/L    Urea Nitrogen 9.0 6.0 - 20.0 mg/dL    Creatinine 0.66 0.51 - 0.95 mg/dL    Calcium 8.1 (L) 8.6 - 10.0 mg/dL    Glucose 105 (H) 70 - 99 mg/dL    Alkaline Phosphatase 70 35 - 104 U/L    AST 26 0 - 45 U/L    ALT 20 0 - 50 U/L    Protein Total 6.6 6.4 - 8.3 g/dL    Albumin 4.2 3.5 - 5.2 g/dL    Bilirubin Total 0.2 <=1.2 mg/dL    GFR Estimate >90 >60 mL/min/1.73m2   Drug abuse screen 77 urine (FL, RH, SH)    Collection Time: 08/13/23  8:35 AM   Result Value Ref Range    Amphetamines Urine Screen Negative Screen Negative    Barbituates Urine Screen Negative Screen Negative    Benzodiazepine Urine Screen Negative Screen Negative    Cannabinoids Urine Screen Negative Screen Negative    Opiates Urine Screen Negative Screen Negative    PCP Urine Screen Negative Screen Negative    Cocaine Urine Screen Negative Screen Negative   CBC with platelets and differential    Collection Time: 08/13/23  8:35 AM   Result Value Ref Range    WBC Count 8.4 4.0 - 11.0 10e3/uL    RBC Count 4.88 3.80 - 5.20 10e6/uL    Hemoglobin 14.6 11.7 - 15.7 g/dL    Hematocrit 42.3 35.0 - 47.0 %    MCV 87 78 - 100 fL    MCH 29.9 26.5 - 33.0 pg    MCHC 34.5 31.5 - 36.5 g/dL    RDW 13.2 10.0 - 15.0 %    Platelet Count 306 150 - 450 10e3/uL    % Neutrophils 60 %    % Lymphocytes 35 %    % Monocytes 3 %    %  Eosinophils 1 %    % Basophils 1 %    % Immature Granulocytes 0 %    NRBCs per 100 WBC 0 <1 /100    Absolute Neutrophils 5.0 1.6 - 8.3 10e3/uL    Absolute Lymphocytes 3.0 0.8 - 5.3 10e3/uL    Absolute Monocytes 0.2 0.0 - 1.3 10e3/uL    Absolute Eosinophils 0.1 0.0 - 0.7 10e3/uL    Absolute Basophils 0.0 0.0 - 0.2 10e3/uL    Absolute Immature Granulocytes 0.0 <=0.4 10e3/uL    Absolute NRBCs 0.0 10e3/uL   Basic metabolic panel    Collection Time: 08/14/23 12:07 PM   Result Value Ref Range    Sodium 137 136 - 145 mmol/L    Potassium 3.9 3.4 - 5.3 mmol/L    Chloride 98 98 - 107 mmol/L    Carbon Dioxide (CO2) 28 22 - 29 mmol/L    Anion Gap 11 7 - 15 mmol/L    Urea Nitrogen 13.7 6.0 - 20.0 mg/dL    Creatinine 0.70 0.51 - 0.95 mg/dL    Calcium 9.3 8.6 - 10.0 mg/dL    Glucose 103 (H) 70 - 99 mg/dL    GFR Estimate >90 >60 mL/min/1.73m2            Because this patient meets criteria for an Alcohol Use Disorder, I performed the following brief intervention on the date of this note:              1) Expressed concern that the patient is drinking at unhealthy levels known to increase their risk of alcohol related problems              2) Gave feedback linking alcohol use and health, including personalized feedback explaining how alcohol use can interact with their medical and/or psychiatric problems, and with prescribed medications.              3) Advised patient to abstain.    PT counseled on nicotine cessation and nicotine replacement provided    Counseled the patient on the importance of having a recovery program in addition to medication to manage recovery.  Components include avoiding isolating, having willingness to change, avoiding triggers and managing cravings. Encouraged having some type of sober network and practicing honesty with trusted support person(s).     Discussed with patient many issues of addiction,triggers, relapse, and establishing a solid recovery program.    DISCHARGE MENTAL STATUS EXAMINATION:  The  patient is alert, oriented x3.  Good fund of knowledge.  Good use of language.  Recent and remote memory, language, fund of knowledge are all adequate.  Euthymic mood congruent affect  Speech normal rate/rhythm linear tp no loose asso,The patient does not have any active suicidal or homicidal ideation.  Does not have any auditory or visual hallucination.  Fair insight/judgment At this time, the patient was stable to be discharged.        Pt was not determined to not be a danger to himself or others. At the current time of discharge, the patient does not meet criteria for involuntary hospitalization. On the day of discharge, the patient reports that they do not have suicidal or homicidal ideation and would never hurt themselves or others. Steps taken to minimize risk include: assessing patient s behavior and thought process daily during hospital stay, discharging patient with adequate plan for follow up for mental and physical health and discussing safety plan of returning to the hospital should the patient ever have thoughts of harming themselves or others. Therefore, based on all available evidence including the factors cited above, the patient does not appear to be at imminent risk for self-harm, and is appropriate for outpatient level of care.     Educated about side effects/risk vs benefits /alternative including non treatment.Pt consented to be on medication.     .Total time spent on discharge summary more than 35 min  More than  20 min  planning, coordination of care, medication reconciliation and performance of physical exam on day of discharge.Care was coordinated with unit RN and unit therapist         Review of your medicines        UNREVIEWED medicines. Ask your doctor about these medicines        Dose / Directions   DULoxetine 60 MG capsule  Commonly known as: CYMBALTA  Ask about: Which instructions should I use?      Dose: 60 mg  Take 60 mg by mouth every evening  Refills: 0     fluticasone 50 MCG/ACT  nasal spray  Commonly known as: FLONASE      Dose: 1 spray  Spray 1 spray in nostril daily as needed for allergies  Refills: 0     folic acid 1 MG tablet  Commonly known as: FOLVITE  Used for: Alcohol use with withdrawal (H)      Dose: 1 mg  Take 1 tablet (1 mg) by mouth daily  Quantity: 30 tablet  Refills: 0     hydrOXYzine 10 MG tablet  Commonly known as: ATARAX      Dose: 10-20 mg  Take 10-20 mg by mouth every 8 hours as needed for anxiety or other (sleep)  Refills: 0     hydrOXYzine 25 MG capsule  Commonly known as: VISTARIL      Dose: 25 mg  Take 25 mg by mouth 3 times daily as needed for anxiety or other (sleep)  Refills: 0     methocarbamol 750 MG tablet  Commonly known as: ROBAXIN      Dose: 1 tablet  Take 1 tablet by mouth every 6 hours as needed for muscle spasms  Refills: 0     thiamine 100 MG tablet  Commonly known as: B-1  Used for: Alcohol use with withdrawal (H)      Dose: 100 mg  Take 1 tablet (100 mg) by mouth daily  Quantity: 30 tablet  Refills: 0     VITAMIN A PO      Dose: 1 capsule  Take 1 capsule by mouth daily  Refills: 0     vitamin C with B complex tablet  Commonly known as: B COMPLEX-C      Dose: 1 tablet  Take 1 tablet by mouth daily  Refills: 0     vitamin D3 125 MCG (5000 UT) tablet  Commonly known as: CHOLECALCIFEROL      Dose: 5,000 Units  Take 5,000 Units by mouth daily  Refills: 0               Disposition: home     Facts about COVID19 at www.cdc.gov/COVID19 and www.[place].gov/covid19     Keeping hands clean is one of the most important steps we can take to avoid getting sick and spreading germs to others.  Please wash your hands frequently and lather with soap for at least 20 seconds!     Medical Follow-Up:cathy worley 8/18/23    Follow-up Appointment:   Appointment Date/Time: Thursday August 24 th at 2:30 pm with Primary Care Giver: Lizzeth Mancia MBBC    Address: [facility]  Medical clinic in [address]  "[place] [address]  Phone [phone]          Date: Thursday, 8/17/2023  Time: 11:00 am - 12:00 pm  Provider: Kelly Alejandra MS  Supervised by: Geovanna Davey MA  [facility]  Location: [facility] [address] [facility], [address], [place] [address]  Phone: [phone]  Type: Teletherapy (for grief)      Treatment Follow-Up:aa  .        \"Much or all of the text in this note was generated through the use of Dragon Dictate voice to text software. Errors in spelling or words which appear to be out of contact are unintentional, may be present due having escaped editing\"     "

## 2023-08-30 NOTE — PROGRESS NOTES
NEW PATIENT RHEUMATOLOGY VISIT     Assessment & Plan     Problem List  Arthralgias and Myalgias   Alcohol use disorder  Depression/Anxiety     (M25.50) Arthralgia, unspecified joint  (primary encounter diagnosis)  Comment: Based on her history, review of systems and exam, I am not concerned for an underlying autoimmune or autoinflammatory disease. The character of her pain fits myofascial pain. May also have a component of pain amplification. We discussed the pathophysiology of pain amplification today as well as multi-faceted approach to treatment with emphasis on gradual increase in activity. Also discussed additional supports including cognitive behavior therapy and mindfulness practices. Discussed role of medications such as Cymbalta, which she is already on.   Plan: XR Cervical Spine Comp w Obl & Flex/Ext, XR Hip        Bilateral 2 Views Each        Recommend reducing NSAID use         RTC in 3 months. If no new symptoms or findings at that time, no need to continue to see rheumatology.     (F10.90) Alcohol use disorder  Comment: Has hx of long term sobriety (8 years) but with recent relapse after trigger of her 's death. However, sober for the last 2.5 weeks. Spent a portion of our visit today discussing relapsing character of alcoholism and importance of continued treatment and support. Applauded her great success both in the past and currently. Reminded her that alcohol use disorder is a disease and not a character flaw. Discussed how stress, depression, anxiety and alcohol use can all increase pain.   Plan: Continue to abstain from alcohol.       Orders Placed This Encounter   Procedures    XR Cervical Spine Comp w Obl & Flex/Ext    XR Hip Bilateral 2 Views Each          >60 minutes spent on the day of the encounter doing chart review, history and exam, documentation and further activities as noted above.     Subjective         HPI    Patient is a 53-year-old female with a history of depression and  anxiety, melanoma, alcohol use disorder, and chronic pain.  She presents today for evaluation for pain which has been worsening over the last 15 years.  She reports her pain started in her late 20s and escalated after her first pregnancy. She notes her problem areas tend to be her neck and upper back as well as her hips.  She gets pain that starts in the back of her head and moves down to her neck and shoulders and upper back and often instigates bad migraines.  She also reports pain in her jaw intermittently with tightness.  She still walks 4 miles a day but has started to have pain in her hips and soreness after walking a couple blocks that limits her activity and sometimes causes her to limp.  Besides physical therapy she does not do any other physical activity.  She has tried heat, warm baths with essential oils, massage, acupuncture, dry needling and cupping, which all provide some relief but nothing lasting.  Her migraines started to get worse in her 30s and she has seen 3 different neurologists for this.  She has had nuchal shots and a cortisone shot in her neck, both of which made her migraines worse.  She has seen a chiropractor and tried abortive medications as well as multiple trials of physical therapy.  She feels that movement makes her pain worse and will often have flares of her pain after physical therapy sessions or after doing chores around the house like vacuuming.  She does not feel she is overly flexible; in fact feels stiff.  For her pain, she takes Ibuprofen 800mg 2-3x/day for 20 years, tylenol prn, robaxin every 6 hours, cymbalta daily, atarax as needed.  She is aware of the risks of taking chronic NSAIDs including heart, kidney, GI effects, but feels the pain is too bad without it.  She has never been to a pain clinic.  She reports she has had multiple images including x-rays, MRIs, and CT scans of her neck and hips in the past.    She adheres to an anti-inflammatory diet.  She feels that  she does not have issues with sleep and feels rested when she wakes up.  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  Review of systems is positive for Raynaud's starting in her 20s with lip and toe color discoloration to blue in the cold.  Does not have color changes in her fingers. No oral or nasal ulcers, no hx of inflammatory eye disease, no rashes, no joint swelling, no GERD, no blood in urine or stool, no dyspnea, no cough, no chest pain, no fevers or weigh loss, no hx of blood clots or miscarriages, preg x2, no dry eyes or dry mouth, +occasional arm numbness and legs fall asleep occasionally, +muscle pain.    She has alcohol use disorder and has had a period of sobriety of 8 years but had a relapse last summer after the death of her second  30 days after their wedding.  She has been sober for the last 2-1/2 weeks.  She has an addiction counselor as well as a mental health therapist.  She has 2 children, daughter and a son in their 20s, who are supportive of her.    No family or personal Hx of known autoimmune disease  Not a previous smoker. Not a current smoker.   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      Lab and Imaging review:    I reviewed recent labs and imaging including:  BMP and CBC wnl       Current Outpatient Medications:     ASHWAGANDHA PO, Take 300 mg by mouth daily, Disp: , Rfl:     atorvastatin (LIPITOR) 10 MG tablet, Take 10 mg by mouth At Bedtime, Disp: , Rfl:     Biotin 1000 MCG CHEW, Chew by mouth., Disp: , Rfl:     Cranberry (RA CRANBERRY) 500 MG CAPS, Take 500 mg by mouth, Disp: , Rfl:     DULoxetine (CYMBALTA) 60 MG capsule, Take 60 mg by mouth every evening, Disp: , Rfl:     fluticasone (FLONASE) 50 MCG/ACT nasal spray, Spray 1 spray into both nostrils daily, Disp: , Rfl:     hydrOXYzine (ATARAX) 10 MG tablet, Take 10-20 mg by mouth every 8 hours as needed for anxiety or other (sleep),  Disp: , Rfl:     magnesium oxide 400 MG CAPS, Take 1 tablet by mouth 2 times daily, Disp: , Rfl:     melatonin 3 MG tablet, Take 3 mg by mouth nightly as needed for sleep, Disp: , Rfl:     Menatetrenone (VITAMIN K2) 100 MCG TABS, , Disp: , Rfl:     methocarbamol (ROBAXIN) 750 MG tablet, Take 1 tablet by mouth every 6 hours as needed for muscle spasms, Disp: , Rfl:     multivitamin w/minerals (THERA-VIT-M) tablet, Take 1 tablet by mouth daily, Disp: 30 tablet, Rfl: 0    Potassium Citrate,Elemental K, 99 MG CAPS, Take 1 capsule by mouth daily, Disp: , Rfl:     riboflavin 100 MG CAPS, Take 1 capsule by mouth 2 times daily, Disp: , Rfl:     thiamine (B-1) 100 MG tablet, Take 1 tablet (100 mg) by mouth daily, Disp: 30 tablet, Rfl: 0    Turmeric (QC TUMERIC COMPLEX PO), , Disp: , Rfl:     vitamin A 3 MG (46373 UNITS) capsule, Take 10,000 Units by mouth daily, Disp: , Rfl:     vitamin D3 (CHOLECALCIFEROL) 125 MCG (5000 UT) tablet, Take 5,000 Units by mouth daily, Disp: , Rfl:     vitamin E (TOCOPHEROL) 1000 units (450 mg) CAPS capsule, Take 1,000 Units by mouth daily, Disp: , Rfl:   Allergies:  Allergies   Allergen Reactions    Amoxicillin Rash     Per the Antimicrobial Stewardship Team 10/19:  No similarities in side chains for Amoxicillin and Ancef, very low to no risk of cross-reactivity.    Sulfa Antibiotics Hives, Itching and Rash     Medical Hx:  Past Medical History:   Diagnosis Date    Alcoholism (H)     Anxiety     Cancer (H) September 2021    Melanoma    Depressive disorder February 3 2021     Surgical Hx:  Past Surgical History:   Procedure Laterality Date    BIOPSY  September 2021    Melanoma     Family Hx:  Family History   Problem Relation Age of Onset    Substance Abuse Mother     Substance Abuse Father      Social Hx:  Social History     Tobacco Use    Smoking status: Never    Smokeless tobacco: Never   Vaping Use    Vaping Use: Never used   Substance Use Topics    Alcohol use: Not Currently    Drug use:  Never        Objective   Physical Exam   /70 (BP Location: Right arm, Patient Position: Sitting, Cuff Size: Adult Regular)   Pulse 64   Wt 62.6 kg (138 lb)   BMI 22.96 kg/m    General: alert, well appearing, no distress  HEENT: clear conjunctiva, no oral or nasal ulcers, no cervical lymphadenopathy  Cardiac: normal rate and rhythm, no murmur, rubs or gallops   Pulm: normal respiratory effort, clear to auscultation bilaterally   MSK: Hand, wrist, elbow, and shoulder joints without evidence of synovitis or effusion. Intact range of motion.  She notes some soreness with abduction and abduction of the shoulders. no Heberden/Ifeanyi nodes. knee/hip joints without erythema/effusion/tenderness to palpation and with intact nonpainful range of motion.  No hypermobility noted at the fingers elbows knees (Beighton score 0/9), no point tenderness or trigger points over the arms legs back or neck  Skin: normal nailfold capillaroscopy no rashes, warm and well-perfused. No nail pitting, no telangiectasias. No subcutaneous nodules.       Ivis Lozano MD  Rheumatology

## 2023-08-31 ENCOUNTER — HOSPITAL ENCOUNTER (OUTPATIENT)
Dept: GENERAL RADIOLOGY | Facility: HOSPITAL | Age: 53
Discharge: HOME | End: 2023-08-31
Attending: STUDENT IN AN ORGANIZED HEALTH CARE EDUCATION/TRAINING PROGRAM
Payer: COMMERCIAL

## 2023-08-31 ENCOUNTER — OFFICE VISIT (OUTPATIENT)
Dept: RHEUMATOLOGY | Facility: CLINIC | Age: 53
End: 2023-08-31
Payer: COMMERCIAL

## 2023-08-31 VITALS
HEART RATE: 64 BPM | DIASTOLIC BLOOD PRESSURE: 70 MMHG | SYSTOLIC BLOOD PRESSURE: 112 MMHG | BODY MASS INDEX: 22.96 KG/M2 | WEIGHT: 138 LBS

## 2023-08-31 DIAGNOSIS — M25.50 ARTHRALGIA, UNSPECIFIED JOINT: Primary | ICD-10-CM

## 2023-08-31 DIAGNOSIS — M25.50 ARTHRALGIA, UNSPECIFIED JOINT: ICD-10-CM

## 2023-08-31 DIAGNOSIS — F10.90 ALCOHOL USE DISORDER: ICD-10-CM

## 2023-08-31 PROCEDURE — 72052 X-RAY EXAM NECK SPINE 6/>VWS: CPT

## 2023-08-31 PROCEDURE — 73522 X-RAY EXAM HIPS BI 3-4 VIEWS: CPT

## 2023-08-31 PROCEDURE — 99205 OFFICE O/P NEW HI 60 MIN: CPT | Performed by: STUDENT IN AN ORGANIZED HEALTH CARE EDUCATION/TRAINING PROGRAM

## 2023-08-31 RX ORDER — METHYLDOPA/HYDROCHLOROTHIAZIDE 250MG-15MG
100 TABLET ORAL DAILY
COMMUNITY

## 2023-08-31 RX ORDER — ATORVASTATIN CALCIUM 10 MG/1
10 TABLET, FILM COATED ORAL AT BEDTIME
COMMUNITY
End: 2024-07-25

## 2023-08-31 RX ORDER — BIOTIN 1000 MCG
1000 TABLET,CHEWABLE ORAL DAILY
COMMUNITY
Start: 2023-02-08

## 2023-08-31 RX ORDER — MULTIVIT WITH MINERALS/LUTEIN
1000 TABLET ORAL DAILY
Status: ON HOLD | COMMUNITY
End: 2024-01-06

## 2023-08-31 RX ORDER — CALCIUM CARBONATE/VITAMIN D3 500-10/5ML
1 LIQUID (ML) ORAL 2 TIMES DAILY
COMMUNITY

## 2023-08-31 RX ORDER — FLUOXETINE 10 MG/1
CAPSULE ORAL
COMMUNITY
Start: 2022-06-20 | End: 2023-08-31

## 2023-08-31 RX ORDER — LANOLIN ALCOHOL/MO/W.PET/CERES
3 CREAM (GRAM) TOPICAL
Status: ON HOLD | COMMUNITY
End: 2024-01-06

## 2023-08-31 RX ORDER — PYRIDOXINE HCL (VITAMIN B6) 100 MG
500 TABLET ORAL DAILY
COMMUNITY
Start: 2023-02-08

## 2023-08-31 RX ORDER — VIT C/B6/B5/MAGNESIUM/HERB 173 50-5-6-5MG
1 CAPSULE ORAL DAILY
COMMUNITY

## 2023-08-31 RX ORDER — CHOLECALCIFEROL (VITAMIN D3) 125 MCG
10000 CAPSULE ORAL DAILY
COMMUNITY

## 2023-08-31 RX ORDER — FLUTICASONE PROPIONATE 50 MCG
1 SPRAY, SUSPENSION (ML) NASAL DAILY PRN
COMMUNITY

## 2023-10-22 ENCOUNTER — HEALTH MAINTENANCE LETTER (OUTPATIENT)
Age: 53
End: 2023-10-22

## 2023-12-01 NOTE — PROGRESS NOTES
RETURN PATIENT RHEUMATOLOGY VISIT     Assessment & Plan     Problem List  Arthralgias and Myalgias   Alcohol use disorder  Depression/Anxiety     (M25.50) Arthralgia, unspecified joint  (primary encounter diagnosis)  Comment: Based on her history, review of systems and exam, I am not concerned for an underlying autoimmune or autoinflammatory disease. The character of her pain fits myofascial pain. May also have a component of pain amplification. X-rays of cervical spine and SI joints reassuring.     We discussed the pathophysiology of pain amplification on our initial visit as well as multi-faceted approach to treatment with emphasis on gradual increase in activity. Also discussed additional supports including cognitive behavior therapy and mindfulness practices. Discussed role of medications such as Cymbalta, which she is already on.   Plan:   -check inflammatory markers today to assess for PMR  -agree with pain clinic  -RTC in 6 months            (F10.90) Alcohol use disorder  Comment: Has hx of long term sobriety (8 years) but with recent relapse(s) after trigger of her 's death. Recently completed inpatient treatment. Very proud of her!  Plan: Continue to abstain from alcohol.  Provided information on psychology today and ideal headspace.      Orders Placed This Encounter   Procedures    Erythrocyte sedimentation rate auto    CRP inflammation          >30 minutes spent on the day of the encounter doing chart review, history and exam, documentation and further activities as noted above.     Subjective     She had a relapse since I last saw her and checked herself into inpatient treatment.  She reports this was the best decision she is ever made.  She feels she is on the right path and has remained sober since discharge.  She submitted paperwork for pain clinic and is waiting for an appointment.  She continues to have pain over the back of her head which moves down her neck and into her shoulders as well as  her clavicle and scapula.  She is also started having jaw pain which worsens towards the end of the day.  Movement of her neck and shoulders triggers bad headaches.  She reports she has had migraines since the age of 21 and they have worsened over the years but more recently have stabilized.  No recent change in her headaches.  No vision changes.  Does get some temporal tension but no scalp tenderness.  She reports some pain over the lateral aspect of her thighs after walking for more than 30 minutes.  The pain is usually asymmetric.  She continues to take about 2000 mg of ibuprofen a day.  She is aware that this is not good for her but does not feel like she has any other options as significant pain is a trigger for her drinking.  She is hopeful that she will be able to find some new modalities and pain clinic.  During inpatient treatment she also learned about biofeedback and mindfulness and is starting to develop those into her life.        HPI    Patient is a 53-year-old female with a history of depression and anxiety, melanoma, alcohol use disorder, and chronic pain.  She presents today for evaluation for pain which has been worsening over the last 15 years.  She reports her pain started in her late 20s and escalated after her first pregnancy. She notes her problem areas tend to be her neck and upper back as well as her hips.  She gets pain that starts in the back of her head and moves down to her neck and shoulders and upper back and often instigates bad migraines.  She also reports pain in her jaw intermittently with tightness.  She still walks 4 miles a day but has started to have pain in her hips and soreness after walking a couple blocks that limits her activity and sometimes causes her to limp.  Besides physical therapy she does not do any other physical activity.  She has tried heat, warm baths with essential oils, massage, acupuncture, dry needling and cupping, which all provide some relief but nothing  lasting.  Her migraines started to get worse in her 30s and she has seen 3 different neurologists for this.  She has had nuchal shots and a cortisone shot in her neck, both of which made her migraines worse.  She has seen a chiropractor and tried abortive medications as well as multiple trials of physical therapy.  She feels that movement makes her pain worse and will often have flares of her pain after physical therapy sessions or after doing chores around the house like vacuuming.  She does not feel she is overly flexible; in fact feels stiff.  For her pain, she takes Ibuprofen 800mg 2-3x/day for 20 years, tylenol prn, robaxin every 6 hours, cymbalta daily, atarax as needed.  She is aware of the risks of taking chronic NSAIDs including heart, kidney, GI effects, but feels the pain is too bad without it.  She has never been to a pain clinic.  She reports she has had multiple images including x-rays, MRIs, and CT scans of her neck and hips in the past.    She adheres to an anti-inflammatory diet.  She feels that she does not have issues with sleep and feels rested when she wakes up.  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  Review of systems is positive for Raynaud's starting in her 20s with lip and toe color discoloration to blue in the cold.  Does not have color changes in her fingers. No oral or nasal ulcers, no hx of inflammatory eye disease, no rashes, no joint swelling, no GERD, no blood in urine or stool, no dyspnea, no cough, no chest pain, no fevers or weigh loss, no hx of blood clots or miscarriages, preg x2, no dry eyes or dry mouth, +occasional arm numbness and legs fall asleep occasionally, +muscle pain.    She has alcohol use disorder and has had a period of sobriety of 8 years but had a relapse last summer after the death of her second  30 days after their wedding.  She has been sober for the last 2-1/2 weeks.  She has an addiction  counselor as well as a mental health therapist.  She has 2 children, daughter and a son in their 20s, who are supportive of her.    No family or personal Hx of known autoimmune disease  Not a previous smoker. Not a current smoker.   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      Lab and Imaging review:    I reviewed recent labs and imaging including:  BMP and CBC wnl       Current Outpatient Medications:     ASHWAGANDHA PO, Take 300 mg by mouth daily, Disp: , Rfl:     atorvastatin (LIPITOR) 10 MG tablet, Take 10 mg by mouth At Bedtime, Disp: , Rfl:     Biotin 1000 MCG CHEW, Chew by mouth., Disp: , Rfl:     Cranberry (RA CRANBERRY) 500 MG CAPS, Take 500 mg by mouth, Disp: , Rfl:     DULoxetine (CYMBALTA) 30 MG capsule, Take 30 mg by mouth 2 times daily, Disp: , Rfl:     DULoxetine (CYMBALTA) 60 MG capsule, Take 60 mg by mouth every evening, Disp: , Rfl:     fluticasone (FLONASE) 50 MCG/ACT nasal spray, Spray 1 spray into both nostrils daily, Disp: , Rfl:     hydrOXYzine (ATARAX) 10 MG tablet, Take 10-20 mg by mouth every 8 hours as needed for anxiety or other (sleep), Disp: , Rfl:     magnesium oxide 400 MG CAPS, Take 1 tablet by mouth 2 times daily, Disp: , Rfl:     melatonin 3 MG tablet, Take 3 mg by mouth nightly as needed for sleep, Disp: , Rfl:     Menatetrenone (VITAMIN K2) 100 MCG TABS, , Disp: , Rfl:     methocarbamol (ROBAXIN) 750 MG tablet, Take 1 tablet by mouth every 6 hours as needed for muscle spasms, Disp: , Rfl:     multivitamin w/minerals (THERA-VIT-M) tablet, Take 1 tablet by mouth daily, Disp: 30 tablet, Rfl: 0    Potassium Citrate,Elemental K, 99 MG CAPS, Take 1 capsule by mouth daily, Disp: , Rfl:     riboflavin 100 MG CAPS, Take 1 capsule by mouth 2 times daily, Disp: , Rfl:     thiamine (B-1) 100 MG tablet, Take 1 tablet (100 mg) by mouth daily, Disp: 30 tablet, Rfl: 0    Turmeric (QC TUMERIC COMPLEX PO), , Disp: , Rfl:     vitamin A 3 MG (50678 UNITS)  capsule, Take 10,000 Units by mouth daily, Disp: , Rfl:     vitamin D3 (CHOLECALCIFEROL) 125 MCG (5000 UT) tablet, Take 5,000 Units by mouth daily, Disp: , Rfl:     vitamin E (TOCOPHEROL) 1000 units (450 mg) CAPS capsule, Take 1,000 Units by mouth daily, Disp: , Rfl:   Allergies:  Allergies   Allergen Reactions    Amoxicillin Rash     Per the Antimicrobial Stewardship Team 10/19:  No similarities in side chains for Amoxicillin and Ancef, very low to no risk of cross-reactivity.    Sulfa Antibiotics Hives, Itching and Rash     Medical Hx:  Past Medical History:   Diagnosis Date    Alcoholism (H)     Anxiety     Cancer (H) September 2021    Melanoma    Depressive disorder February 3 2021     Surgical Hx:  Past Surgical History:   Procedure Laterality Date    BIOPSY  September 2021    Melanoma     Family Hx:  Family History   Problem Relation Age of Onset    Substance Abuse Mother     Substance Abuse Father      Social Hx:  Social History     Tobacco Use    Smoking status: Never    Smokeless tobacco: Never   Vaping Use    Vaping Use: Never used   Substance Use Topics    Alcohol use: Not Currently    Drug use: Never        Objective   Physical Exam   /72 (BP Location: Right arm, Patient Position: Sitting, Cuff Size: Adult Regular)   Pulse 75   Wt 64.6 kg (142 lb 8 oz)   SpO2 99%   BMI 23.71 kg/m    General: alert, well appearing, no distress  HEENT: clear conjunctiva, no scalp tenderness, equal temporal pulses,   Cardiac: warm and well perfused   Pulm: normal respiratory effort,   MSK: Hand, wrist, elbow, and shoulder joints without evidence of synovitis or effusion. Intact range of motion.    Skin: no rashes, warm and well-perfused.       Ivis Lozano MD  Rheumatology

## 2023-12-04 ENCOUNTER — LAB (OUTPATIENT)
Dept: LAB | Facility: CLINIC | Age: 53
End: 2023-12-04
Payer: COMMERCIAL

## 2023-12-04 ENCOUNTER — OFFICE VISIT (OUTPATIENT)
Dept: RHEUMATOLOGY | Facility: CLINIC | Age: 53
End: 2023-12-04
Payer: COMMERCIAL

## 2023-12-04 VITALS
HEART RATE: 75 BPM | WEIGHT: 142.5 LBS | DIASTOLIC BLOOD PRESSURE: 72 MMHG | OXYGEN SATURATION: 99 % | SYSTOLIC BLOOD PRESSURE: 120 MMHG | BODY MASS INDEX: 23.71 KG/M2

## 2023-12-04 DIAGNOSIS — M79.10 MYALGIA: Primary | ICD-10-CM

## 2023-12-04 DIAGNOSIS — M79.10 MYALGIA: ICD-10-CM

## 2023-12-04 LAB
CRP SERPL-MCNC: <3 MG/L
ERYTHROCYTE [SEDIMENTATION RATE] IN BLOOD BY WESTERGREN METHOD: 8 MM/HR (ref 0–30)

## 2023-12-04 PROCEDURE — 99214 OFFICE O/P EST MOD 30 MIN: CPT | Performed by: STUDENT IN AN ORGANIZED HEALTH CARE EDUCATION/TRAINING PROGRAM

## 2023-12-04 PROCEDURE — 86140 C-REACTIVE PROTEIN: CPT

## 2023-12-04 PROCEDURE — 36415 COLL VENOUS BLD VENIPUNCTURE: CPT

## 2023-12-04 PROCEDURE — 85652 RBC SED RATE AUTOMATED: CPT

## 2023-12-04 RX ORDER — DULOXETIN HYDROCHLORIDE 30 MG/1
30 CAPSULE, DELAYED RELEASE ORAL DAILY
COMMUNITY
End: 2024-07-25

## 2024-01-05 ENCOUNTER — HOSPITAL ENCOUNTER (EMERGENCY)
Facility: HOSPITAL | Age: 54
Discharge: NURSING, REHAB OR LONG-TERM CARE | End: 2024-01-06
Attending: STUDENT IN AN ORGANIZED HEALTH CARE EDUCATION/TRAINING PROGRAM | Admitting: STUDENT IN AN ORGANIZED HEALTH CARE EDUCATION/TRAINING PROGRAM
Payer: COMMERCIAL

## 2024-01-05 DIAGNOSIS — F10.920 ALCOHOLIC INTOXICATION WITHOUT COMPLICATION (H): ICD-10-CM

## 2024-01-05 LAB
ALBUMIN SERPL BCG-MCNC: 4.4 G/DL (ref 3.5–5.2)
ALP SERPL-CCNC: 99 U/L (ref 40–150)
ALT SERPL W P-5'-P-CCNC: 35 U/L (ref 0–50)
ANION GAP SERPL CALCULATED.3IONS-SCNC: 20 MMOL/L (ref 7–15)
AST SERPL W P-5'-P-CCNC: ABNORMAL U/L
BASOPHILS # BLD AUTO: 0.1 10E3/UL (ref 0–0.2)
BASOPHILS NFR BLD AUTO: 1 %
BILIRUB SERPL-MCNC: 0.5 MG/DL
BUN SERPL-MCNC: 20.1 MG/DL (ref 6–20)
CALCIUM SERPL-MCNC: 8.6 MG/DL (ref 8.6–10)
CHLORIDE SERPL-SCNC: 101 MMOL/L (ref 98–107)
CREAT SERPL-MCNC: 0.68 MG/DL (ref 0.51–0.95)
DEPRECATED HCO3 PLAS-SCNC: 21 MMOL/L (ref 22–29)
EGFRCR SERPLBLD CKD-EPI 2021: >90 ML/MIN/1.73M2
EOSINOPHIL # BLD AUTO: 0.1 10E3/UL (ref 0–0.7)
EOSINOPHIL NFR BLD AUTO: 1 %
ERYTHROCYTE [DISTWIDTH] IN BLOOD BY AUTOMATED COUNT: 12.9 % (ref 10–15)
ETHANOL SERPL-MCNC: 0.35 G/DL
GLUCOSE SERPL-MCNC: 88 MG/DL (ref 70–99)
HCT VFR BLD AUTO: 45.7 % (ref 35–47)
HGB BLD-MCNC: 15.7 G/DL (ref 11.7–15.7)
HOLD SPECIMEN: NORMAL
IMM GRANULOCYTES # BLD: 0.1 10E3/UL
IMM GRANULOCYTES NFR BLD: 1 %
LYMPHOCYTES # BLD AUTO: 4.2 10E3/UL (ref 0.8–5.3)
LYMPHOCYTES NFR BLD AUTO: 35 %
MAGNESIUM SERPL-MCNC: 1.9 MG/DL (ref 1.7–2.3)
MCH RBC QN AUTO: 29.8 PG (ref 26.5–33)
MCHC RBC AUTO-ENTMCNC: 34.4 G/DL (ref 31.5–36.5)
MCV RBC AUTO: 87 FL (ref 78–100)
MONOCYTES # BLD AUTO: 0.4 10E3/UL (ref 0–1.3)
MONOCYTES NFR BLD AUTO: 4 %
NEUTROPHILS # BLD AUTO: 7.2 10E3/UL (ref 1.6–8.3)
NEUTROPHILS NFR BLD AUTO: 58 %
NRBC # BLD AUTO: 0 10E3/UL
NRBC BLD AUTO-RTO: 0 /100
PLATELET # BLD AUTO: 369 10E3/UL (ref 150–450)
POTASSIUM SERPL-SCNC: 4.8 MMOL/L (ref 3.4–5.3)
PROT SERPL-MCNC: 7.5 G/DL (ref 6.4–8.3)
RBC # BLD AUTO: 5.26 10E6/UL (ref 3.8–5.2)
SODIUM SERPL-SCNC: 142 MMOL/L (ref 135–145)
WBC # BLD AUTO: 12.1 10E3/UL (ref 4–11)

## 2024-01-05 PROCEDURE — 82247 BILIRUBIN TOTAL: CPT | Performed by: STUDENT IN AN ORGANIZED HEALTH CARE EDUCATION/TRAINING PROGRAM

## 2024-01-05 PROCEDURE — 36415 COLL VENOUS BLD VENIPUNCTURE: CPT | Performed by: STUDENT IN AN ORGANIZED HEALTH CARE EDUCATION/TRAINING PROGRAM

## 2024-01-05 PROCEDURE — 85025 COMPLETE CBC W/AUTO DIFF WBC: CPT | Performed by: STUDENT IN AN ORGANIZED HEALTH CARE EDUCATION/TRAINING PROGRAM

## 2024-01-05 PROCEDURE — 80048 BASIC METABOLIC PNL TOTAL CA: CPT | Mod: 91 | Performed by: STUDENT IN AN ORGANIZED HEALTH CARE EDUCATION/TRAINING PROGRAM

## 2024-01-05 PROCEDURE — 99284 EMERGENCY DEPT VISIT MOD MDM: CPT | Mod: 25

## 2024-01-05 PROCEDURE — 258N000003 HC RX IP 258 OP 636: Performed by: STUDENT IN AN ORGANIZED HEALTH CARE EDUCATION/TRAINING PROGRAM

## 2024-01-05 PROCEDURE — 96361 HYDRATE IV INFUSION ADD-ON: CPT

## 2024-01-05 PROCEDURE — 82077 ASSAY SPEC XCP UR&BREATH IA: CPT | Performed by: STUDENT IN AN ORGANIZED HEALTH CARE EDUCATION/TRAINING PROGRAM

## 2024-01-05 PROCEDURE — 84155 ASSAY OF PROTEIN SERUM: CPT | Performed by: STUDENT IN AN ORGANIZED HEALTH CARE EDUCATION/TRAINING PROGRAM

## 2024-01-05 PROCEDURE — 83735 ASSAY OF MAGNESIUM: CPT | Performed by: STUDENT IN AN ORGANIZED HEALTH CARE EDUCATION/TRAINING PROGRAM

## 2024-01-05 RX ADMIN — SODIUM CHLORIDE 500 ML: 9 INJECTION, SOLUTION INTRAVENOUS at 22:58

## 2024-01-05 RX ADMIN — SODIUM CHLORIDE 500 ML: 9 INJECTION, SOLUTION INTRAVENOUS at 21:53

## 2024-01-05 ASSESSMENT — ACTIVITIES OF DAILY LIVING (ADL)
ADLS_ACUITY_SCORE: 33
ADLS_ACUITY_SCORE: 35
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
## 2024-01-05 NOTE — ED TRIAGE NOTES
"Patient is here for detox for etoh. Was sober for 3 months and is on a 5 day binge. Patient drinks \" a lot\" a day and last drink was just before ER. Patient is intoxicated in triage.          Triage Assessment (Adult)       Row Name 01/05/24 1733          Triage Assessment    Airway WDL WDL        Respiratory WDL    Respiratory WDL WDL        Skin Circulation/Temperature WDL    Skin Circulation/Temperature WDL WDL        Cardiac WDL    Cardiac WDL WDL        Peripheral/Neurovascular WDL    Peripheral Neurovascular WDL WDL        Cognitive/Neuro/Behavioral WDL    Cognitive/Neuro/Behavioral WDL WDL                     "

## 2024-01-06 ENCOUNTER — HOSPITAL ENCOUNTER (INPATIENT)
Facility: CLINIC | Age: 54
LOS: 2 days | Discharge: HOME | End: 2024-01-08
Attending: PSYCHIATRY & NEUROLOGY | Admitting: PSYCHIATRY & NEUROLOGY
Payer: COMMERCIAL

## 2024-01-06 ENCOUNTER — TELEPHONE (OUTPATIENT)
Dept: BEHAVIORAL HEALTH | Facility: CLINIC | Age: 54
End: 2024-01-06
Payer: MEDICARE

## 2024-01-06 VITALS
SYSTOLIC BLOOD PRESSURE: 152 MMHG | OXYGEN SATURATION: 96 % | RESPIRATION RATE: 20 BRPM | BODY MASS INDEX: 23.66 KG/M2 | DIASTOLIC BLOOD PRESSURE: 87 MMHG | TEMPERATURE: 98.5 F | HEART RATE: 88 BPM | WEIGHT: 142 LBS | HEIGHT: 65 IN

## 2024-01-06 PROBLEM — F10.930 ALCOHOL WITHDRAWAL, UNCOMPLICATED (H): Status: ACTIVE | Noted: 2024-01-06

## 2024-01-06 LAB
AMPHETAMINES UR QL SCN: NORMAL
ANION GAP SERPL CALCULATED.3IONS-SCNC: 13 MMOL/L (ref 7–15)
BARBITURATES UR QL SCN: NORMAL
BENZODIAZ UR QL SCN: NORMAL
BUN SERPL-MCNC: 16.3 MG/DL (ref 6–20)
BZE UR QL SCN: NORMAL
CALCIUM SERPL-MCNC: 7.5 MG/DL (ref 8.6–10)
CANNABINOIDS UR QL SCN: NORMAL
CHLORIDE SERPL-SCNC: 105 MMOL/L (ref 98–107)
CREAT SERPL-MCNC: 0.62 MG/DL (ref 0.51–0.95)
DEPRECATED HCO3 PLAS-SCNC: 25 MMOL/L (ref 22–29)
EGFRCR SERPLBLD CKD-EPI 2021: >90 ML/MIN/1.73M2
FENTANYL UR QL: NORMAL
GLUCOSE SERPL-MCNC: 84 MG/DL (ref 70–99)
OPIATES UR QL SCN: NORMAL
PCP QUAL URINE (ROCHE): NORMAL
POTASSIUM SERPL-SCNC: 3.4 MMOL/L (ref 3.4–5.3)
SODIUM SERPL-SCNC: 143 MMOL/L (ref 135–145)

## 2024-01-06 PROCEDURE — 250N000011 HC RX IP 250 OP 636: Performed by: REGISTERED NURSE

## 2024-01-06 PROCEDURE — 250N000011 HC RX IP 250 OP 636: Performed by: EMERGENCY MEDICINE

## 2024-01-06 PROCEDURE — 96376 TX/PRO/DX INJ SAME DRUG ADON: CPT

## 2024-01-06 PROCEDURE — 128N000004 HC R&B CD ADULT

## 2024-01-06 PROCEDURE — 250N000013 HC RX MED GY IP 250 OP 250 PS 637: Performed by: REGISTERED NURSE

## 2024-01-06 PROCEDURE — G0177 OPPS/PHP; TRAIN & EDUC SERV: HCPCS

## 2024-01-06 PROCEDURE — HZ2ZZZZ DETOXIFICATION SERVICES FOR SUBSTANCE ABUSE TREATMENT: ICD-10-PCS | Performed by: REGISTERED NURSE

## 2024-01-06 PROCEDURE — 99223 1ST HOSP IP/OBS HIGH 75: CPT | Mod: AI | Performed by: REGISTERED NURSE

## 2024-01-06 PROCEDURE — 96374 THER/PROPH/DIAG INJ IV PUSH: CPT

## 2024-01-06 PROCEDURE — 250N000013 HC RX MED GY IP 250 OP 250 PS 637: Performed by: EMERGENCY MEDICINE

## 2024-01-06 PROCEDURE — 80307 DRUG TEST PRSMV CHEM ANLYZR: CPT | Performed by: STUDENT IN AN ORGANIZED HEALTH CARE EDUCATION/TRAINING PROGRAM

## 2024-01-06 RX ORDER — CHOLECALCIFEROL (VITAMIN D3) 125 MCG
10000 CAPSULE ORAL DAILY
Status: DISCONTINUED | OUTPATIENT
Start: 2024-01-07 | End: 2024-01-08 | Stop reason: HOSPADM

## 2024-01-06 RX ORDER — HYDROXYZINE HYDROCHLORIDE 25 MG/1
25 TABLET, FILM COATED ORAL EVERY 4 HOURS PRN
Status: DISCONTINUED | OUTPATIENT
Start: 2024-01-06 | End: 2024-01-06

## 2024-01-06 RX ORDER — MAGNESIUM OXIDE 400 MG/1
400 TABLET ORAL 2 TIMES DAILY
Status: DISCONTINUED | OUTPATIENT
Start: 2024-01-06 | End: 2024-01-08 | Stop reason: HOSPADM

## 2024-01-06 RX ORDER — IBUPROFEN 600 MG/1
600 TABLET, FILM COATED ORAL EVERY 4 HOURS PRN
COMMUNITY

## 2024-01-06 RX ORDER — DULOXETIN HYDROCHLORIDE 60 MG/1
60 CAPSULE, DELAYED RELEASE ORAL EVERY EVENING
Status: DISCONTINUED | OUTPATIENT
Start: 2024-01-06 | End: 2024-01-08 | Stop reason: HOSPADM

## 2024-01-06 RX ORDER — TRAZODONE HYDROCHLORIDE 50 MG/1
50 TABLET, FILM COATED ORAL
Status: DISCONTINUED | OUTPATIENT
Start: 2024-01-06 | End: 2024-01-08 | Stop reason: HOSPADM

## 2024-01-06 RX ORDER — HYDROXYZINE HYDROCHLORIDE 25 MG/1
25 TABLET, FILM COATED ORAL EVERY 8 HOURS PRN
COMMUNITY
End: 2024-07-25

## 2024-01-06 RX ORDER — MULTIPLE VITAMINS W/ MINERALS TAB 9MG-400MCG
1 TAB ORAL DAILY
Status: DISCONTINUED | OUTPATIENT
Start: 2024-01-06 | End: 2024-01-08 | Stop reason: HOSPADM

## 2024-01-06 RX ORDER — GABAPENTIN 100 MG/1
100-200 CAPSULE ORAL DAILY
COMMUNITY
End: 2024-07-11

## 2024-01-06 RX ORDER — HYDROXYZINE HYDROCHLORIDE 25 MG/1
25 TABLET, FILM COATED ORAL EVERY 4 HOURS PRN
Status: DISCONTINUED | OUTPATIENT
Start: 2024-01-06 | End: 2024-01-08 | Stop reason: HOSPADM

## 2024-01-06 RX ORDER — ACETAMINOPHEN 325 MG/1
650 TABLET ORAL EVERY 4 HOURS PRN
Status: DISCONTINUED | OUTPATIENT
Start: 2024-01-06 | End: 2024-01-08 | Stop reason: HOSPADM

## 2024-01-06 RX ORDER — METHOCARBAMOL 750 MG/1
750 TABLET, FILM COATED ORAL EVERY 6 HOURS PRN
Status: DISCONTINUED | OUTPATIENT
Start: 2024-01-06 | End: 2024-01-08 | Stop reason: HOSPADM

## 2024-01-06 RX ORDER — ATENOLOL 50 MG/1
50 TABLET ORAL DAILY PRN
Status: DISCONTINUED | OUTPATIENT
Start: 2024-01-06 | End: 2024-01-08 | Stop reason: HOSPADM

## 2024-01-06 RX ORDER — GABAPENTIN 100 MG/1
100-200 CAPSULE ORAL DAILY
Status: DISCONTINUED | OUTPATIENT
Start: 2024-01-06 | End: 2024-01-06

## 2024-01-06 RX ORDER — GABAPENTIN 100 MG/1
200 CAPSULE ORAL DAILY
Status: DISCONTINUED | OUTPATIENT
Start: 2024-01-07 | End: 2024-01-08 | Stop reason: HOSPADM

## 2024-01-06 RX ORDER — LORAZEPAM 1 MG/1
1-4 TABLET ORAL EVERY 30 MIN PRN
Status: CANCELLED | OUTPATIENT
Start: 2024-01-06

## 2024-01-06 RX ORDER — ONDANSETRON 4 MG/1
4 TABLET, ORALLY DISINTEGRATING ORAL EVERY 6 HOURS PRN
Status: DISCONTINUED | OUTPATIENT
Start: 2024-01-06 | End: 2024-01-08 | Stop reason: HOSPADM

## 2024-01-06 RX ORDER — AMOXICILLIN 250 MG
1 CAPSULE ORAL 2 TIMES DAILY PRN
Status: DISCONTINUED | OUTPATIENT
Start: 2024-01-06 | End: 2024-01-08 | Stop reason: HOSPADM

## 2024-01-06 RX ORDER — MAGNESIUM HYDROXIDE/ALUMINUM HYDROXICE/SIMETHICONE 120; 1200; 1200 MG/30ML; MG/30ML; MG/30ML
30 SUSPENSION ORAL EVERY 4 HOURS PRN
Status: DISCONTINUED | OUTPATIENT
Start: 2024-01-06 | End: 2024-01-08 | Stop reason: HOSPADM

## 2024-01-06 RX ORDER — POTASSIUM CITRATE 5 MEQ/1
5 TABLET, EXTENDED RELEASE ORAL DAILY
Status: DISCONTINUED | OUTPATIENT
Start: 2024-01-07 | End: 2024-01-08 | Stop reason: HOSPADM

## 2024-01-06 RX ORDER — VITAMIN B COMPLEX
125 TABLET ORAL DAILY
Status: DISCONTINUED | OUTPATIENT
Start: 2024-01-07 | End: 2024-01-08 | Stop reason: HOSPADM

## 2024-01-06 RX ORDER — OLANZAPINE 10 MG/1
10 TABLET ORAL 3 TIMES DAILY PRN
Status: DISCONTINUED | OUTPATIENT
Start: 2024-01-06 | End: 2024-01-08 | Stop reason: HOSPADM

## 2024-01-06 RX ORDER — LORAZEPAM 2 MG/ML
0.5 INJECTION INTRAMUSCULAR ONCE
Status: COMPLETED | OUTPATIENT
Start: 2024-01-06 | End: 2024-01-06

## 2024-01-06 RX ORDER — DULOXETIN HYDROCHLORIDE 30 MG/1
30 CAPSULE, DELAYED RELEASE ORAL DAILY
Status: DISCONTINUED | OUTPATIENT
Start: 2024-01-07 | End: 2024-01-08 | Stop reason: HOSPADM

## 2024-01-06 RX ORDER — MULTIPLE VITAMINS W/ MINERALS TAB 9MG-400MCG
1 TAB ORAL DAILY
Status: DISCONTINUED | OUTPATIENT
Start: 2024-01-06 | End: 2024-01-06

## 2024-01-06 RX ORDER — FOLIC ACID 1 MG/1
1 TABLET ORAL DAILY
Status: DISCONTINUED | OUTPATIENT
Start: 2024-01-06 | End: 2024-01-08 | Stop reason: HOSPADM

## 2024-01-06 RX ORDER — HYDROXYZINE HYDROCHLORIDE 25 MG/1
25-50 TABLET, FILM COATED ORAL EVERY 4 HOURS PRN
Status: DISCONTINUED | OUTPATIENT
Start: 2024-01-06 | End: 2024-01-06

## 2024-01-06 RX ORDER — DIAZEPAM 5 MG
5 TABLET ORAL ONCE
Status: COMPLETED | OUTPATIENT
Start: 2024-01-06 | End: 2024-01-06

## 2024-01-06 RX ORDER — ATORVASTATIN CALCIUM 10 MG/1
10 TABLET, FILM COATED ORAL AT BEDTIME
Status: DISCONTINUED | OUTPATIENT
Start: 2024-01-06 | End: 2024-01-08 | Stop reason: HOSPADM

## 2024-01-06 RX ORDER — OLANZAPINE 10 MG/2ML
10 INJECTION, POWDER, FOR SOLUTION INTRAMUSCULAR 3 TIMES DAILY PRN
Status: DISCONTINUED | OUTPATIENT
Start: 2024-01-06 | End: 2024-01-08 | Stop reason: HOSPADM

## 2024-01-06 RX ORDER — LORAZEPAM 2 MG/ML
1 INJECTION INTRAMUSCULAR ONCE
Status: COMPLETED | OUTPATIENT
Start: 2024-01-06 | End: 2024-01-06

## 2024-01-06 RX ORDER — DIAZEPAM 5 MG
5-20 TABLET ORAL EVERY 30 MIN PRN
Status: DISCONTINUED | OUTPATIENT
Start: 2024-01-06 | End: 2024-01-08 | Stop reason: HOSPADM

## 2024-01-06 RX ADMIN — ATORVASTATIN CALCIUM 10 MG: 10 TABLET, FILM COATED ORAL at 20:15

## 2024-01-06 RX ADMIN — DIAZEPAM 10 MG: 5 TABLET ORAL at 16:49

## 2024-01-06 RX ADMIN — DIAZEPAM 10 MG: 5 TABLET ORAL at 20:15

## 2024-01-06 RX ADMIN — LORAZEPAM 0.5 MG: 2 INJECTION INTRAMUSCULAR; INTRAVENOUS at 06:42

## 2024-01-06 RX ADMIN — ONDANSETRON 4 MG: 4 TABLET, ORALLY DISINTEGRATING ORAL at 20:14

## 2024-01-06 RX ADMIN — DIAZEPAM 5 MG: 5 TABLET ORAL at 07:42

## 2024-01-06 RX ADMIN — ACETAMINOPHEN 650 MG: 325 TABLET, FILM COATED ORAL at 16:49

## 2024-01-06 RX ADMIN — LORAZEPAM 1 MG: 2 INJECTION INTRAMUSCULAR; INTRAVENOUS at 10:20

## 2024-01-06 RX ADMIN — DULOXETINE HYDROCHLORIDE 60 MG: 60 CAPSULE, DELAYED RELEASE ORAL at 20:15

## 2024-01-06 RX ADMIN — DIAZEPAM 10 MG: 5 TABLET ORAL at 14:54

## 2024-01-06 RX ADMIN — DIAZEPAM 10 MG: 5 TABLET ORAL at 13:03

## 2024-01-06 RX ADMIN — MAGNESIUM OXIDE TAB 400 MG (241.3 MG ELEMENTAL MG) 400 MG: 400 (241.3 MG) TAB at 20:15

## 2024-01-06 ASSESSMENT — ACTIVITIES OF DAILY LIVING (ADL)
ADLS_ACUITY_SCORE: 35
ADLS_ACUITY_SCORE: 30
LAUNDRY: WITH SUPERVISION
ADLS_ACUITY_SCORE: 30
ADLS_ACUITY_SCORE: 30
ADLS_ACUITY_SCORE: 35
ORAL_HYGIENE: INDEPENDENT
ADLS_ACUITY_SCORE: 30
ADLS_ACUITY_SCORE: 35
DRESS: INDEPENDENT
ADLS_ACUITY_SCORE: 30
ADLS_ACUITY_SCORE: 35
ADLS_ACUITY_SCORE: 30
HYGIENE/GROOMING: INDEPENDENT
ADLS_ACUITY_SCORE: 35
DRESS: INDEPENDENT
ADLS_ACUITY_SCORE: 35
ORAL_HYGIENE: INDEPENDENT
HYGIENE/GROOMING: INDEPENDENT

## 2024-01-06 ASSESSMENT — LIFESTYLE VARIABLES
VISUAL DISTURBANCES: NOT PRESENT
VISUAL DISTURBANCES: NOT PRESENT
AUDITORY DISTURBANCES: NOT PRESENT
ANXIETY: MODERATELY ANXIOUS, OR GUARDED, SO ANXIETY IS INFERRED
PAROXYSMAL SWEATS: 3
TREMOR: NOT VISIBLE, BUT CAN BE FELT FINGERTIP TO FINGERTIP
AGITATION: NORMAL ACTIVITY
AUDITORY DISTURBANCES: NOT PRESENT
VISUAL DISTURBANCES: NOT PRESENT
ANXIETY: MILDLY ANXIOUS
NAUSEA AND VOMITING: NO NAUSEA AND NO VOMITING
VISUAL DISTURBANCES: NOT PRESENT
ORIENTATION AND CLOUDING OF SENSORIUM: ORIENTED AND CAN DO SERIAL ADDITIONS
TREMOR: 6
HEADACHE, FULLNESS IN HEAD: MILD
HEADACHE, FULLNESS IN HEAD: VERY MILD
TREMOR: 5
ORIENTATION AND CLOUDING OF SENSORIUM: ORIENTED AND CAN DO SERIAL ADDITIONS
NAUSEA AND VOMITING: NO NAUSEA AND NO VOMITING
TOTAL SCORE: 3
TOTAL SCORE: 3
AGITATION: NORMAL ACTIVITY
AUDITORY DISTURBANCES: NOT PRESENT
TREMOR: NO TREMOR
NAUSEA AND VOMITING: NO NAUSEA AND NO VOMITING
AUDITORY DISTURBANCES: NOT PRESENT
NAUSEA AND VOMITING: NO NAUSEA AND NO VOMITING
AGITATION: SOMEWHAT MORE THAN NORMAL ACTIVITY
HEADACHE, FULLNESS IN HEAD: NOT PRESENT
HEADACHE, FULLNESS IN HEAD: EXTREMELY SEVERE
TREMOR: 2
NAUSEA AND VOMITING: NO NAUSEA AND NO VOMITING
ANXIETY: MILDLY ANXIOUS
TREMOR: 3
PAROXYSMAL SWEATS: NO SWEAT VISIBLE
AGITATION: NORMAL ACTIVITY
PAROXYSMAL SWEATS: NO SWEAT VISIBLE
ORIENTATION AND CLOUDING OF SENSORIUM: ORIENTED AND CAN DO SERIAL ADDITIONS
PAROXYSMAL SWEATS: 2
TOTAL SCORE: 8
PAROXYSMAL SWEATS: NO SWEAT VISIBLE
AUDITORY DISTURBANCES: NOT PRESENT
HEADACHE, FULLNESS IN HEAD: NOT PRESENT
TOTAL SCORE: 1
AGITATION: NORMAL ACTIVITY
TOTAL SCORE: 7
HEADACHE, FULLNESS IN HEAD: NOT PRESENT
NAUSEA AND VOMITING: NO NAUSEA AND NO VOMITING
AGITATION: NORMAL ACTIVITY
AUDITORY DISTURBANCES: NOT PRESENT
VISUAL DISTURBANCES: NOT PRESENT
ANXIETY: NO ANXIETY, AT EASE
ORIENTATION AND CLOUDING OF SENSORIUM: ORIENTED AND CAN DO SERIAL ADDITIONS
TOTAL SCORE: 19
VISUAL DISTURBANCES: NOT PRESENT
ORIENTATION AND CLOUDING OF SENSORIUM: ORIENTED AND CAN DO SERIAL ADDITIONS
ANXIETY: NO ANXIETY, AT EASE
ORIENTATION AND CLOUDING OF SENSORIUM: ORIENTED AND CAN DO SERIAL ADDITIONS
ANXIETY: MILDLY ANXIOUS
PAROXYSMAL SWEATS: BARELY PERCEPTIBLE SWEATING, PALMS MOIST
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
## 2024-01-06 NOTE — ED NOTES
Called intake for detox for update,will not be until after morning shift starts before [facility] detox ready for report

## 2024-01-06 NOTE — TELEPHONE ENCOUNTER
2:34am - Paged [facility] for possible placement to [facility]    3:05am - [facility] accepts pt for [facility]    3:06am - Notified Unit of pt in the queue.    3:13am - Notified ED of pt placement. Unit will call [facility] ED when ready for report. #[phone]    Indicia Completed S.R    R: Patient placement to [facility]/ [place]

## 2024-01-06 NOTE — ED PROVIDER NOTES
Patient awaiting transport to detox.  Nursing is asked for something for her withdrawal as we are told from the detox center they are not quite yet ready to have her take a ride over there.  Some Valium and Ativan have been ordered.     Damaris Arriola MD  01/06/24 0686     07-Nov-2022 21:43

## 2024-01-06 NOTE — ED NOTES
"Pt given water to drink. Pt states \"feel shakey\", CIWA=3, mild tremor. Will tell ER MD about pt status.  "

## 2024-01-06 NOTE — PROGRESS NOTES
01/06/24 1322   Patient Belongings   Did you bring any home meds/supplements to the hospital?  Yes   Patient Belongings other (see comments)   Belongings Search Yes   Clothing Search Yes   Second Staff Nuk and logan     Storage Bin : Purse with personal make up, gift cars,extra gum,  wallet , key ,bra 1 bar, 4 pens, candy and colander note book   Security Env:  cash 7 dollars and 80 cents  1 [employer] Visa ,1 [employer] visa ,1 prime visa and drivers license    Medication Env.[id number] with some pills and first aid portable pack and eye drop   Med room Bin : cell phone     A               Admission:  I am responsible for any personal items that are not sent to the safe or pharmacy.  [facility] is not responsible for loss, theft or damage of any property in my possession.    Signature:  _________________________________ Date: _______  Time: _____                                              Staff Signature:  ____________________________ Date: ________  Time: _____      2nd Staff person, if patient is unable/unwilling to sign:    Signature: ________________________________ Date: ________  Time: _____     Discharge:  [facility] has returned all of my personal belongings:    Signature: _________________________________ Date: ________  Time: _____                                          Staff Signature:  ____________________________ Date: ________  Time: _____

## 2024-01-06 NOTE — H&P
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 Memory:  Not formally tested, adequate for interview.     Fund of Knowledge: Not formally tested, appears to be appropriate.            Physical Exam:   Please refer to physical exam completed by ED provider, Kiara Wood MD, on 1/5/24. I agree with the findings and assessment and have no additional findings to add at this time.     Certain aspects of this note may be copied or templated. Content is updated and changed where needed to reflect the most recent assessment and plan of care.     Attestation:  Patient has been seen and evaluated by me, KYREE Garcia, CNP.  The patient was counseled on nature of illness and treatment plan/options.  Care was coordinated with treatment team.  Total time >75 minutes  "

## 2024-01-06 NOTE — TELEPHONE ENCOUNTER
S: [facility] ED , Provider Swiggum calling at 12:40am with clinical on a 53 year old/Female presenting for alcohol detox.     B: Pt presents for ETOH detox.   Currently reports drinking 6 Fireball whiskey for 5 day binge.    Patient reports last use was PTA.  Pt SHAGGY: 0.35  Pt  denies hx of DT  Pt  denies hx of seizures. Last seizure: N/A  Pt endorsing the following symptoms of withdrawal: Tremulous and Hypertensive  MSSA Score: NA    Pt denies acute mental health or medical concerns.   Pt denies other drug use: None Amount/frequency: N/A    Does Pt have a detox care plan in Epic? None  Does pt present with specific needs, assistive devices, or exclusionary criteria? None  Is the patient ambulating, eating and drinking in the ED? Yes    A: Pt meets criteria to be presented for IP detox admission. Patient is voluntary    COVID Symptoms: No  If yes, COVID test required   Utox: Ordered, not yet collected  Magnesium: WNL 1.9  CMP: Abnormalities: Anion Gap 20;   CBC: Abnormalities: RBC 5.26; WBC 12.1  HCG: N/A     R: Patient cleared and ready for behavioral bed placement: Yes    Pt is meeting criteria for presentation to 3A/CD    Does Patient need a Transfer Center request created? Yes, writer completed Transfer Center request at:  1am

## 2024-01-06 NOTE — CARE PLAN
Occupational Therapy     01/06/24 1700   Group Therapy Session   Group Attendance attended group session   Time Session Began 1645   Time Session Ended 1745   Total Time (minutes) 50   Total # Attendees 9   Group Type recreation   Group Topic Covered cognitive activities;coping skills/lifestyle management;leisure exploration/use of leisure time;structured socialization;balanced lifestyle   Group Session Detail OT: Education cognitive wellness and interactive social activity (Scattegories) to increase concentration, focus, attention to task/detail, task follow through, frustration tolerance, memory recall, healthy leisure engagement, coping with stress, heathy distraction engagement, cognitive wellness, social wellness, and social engagement   Patient Response/Contribution confused;cooperative with task;other (see comments)  (pleasant; engaged)   Patient Participation Detail Pt arrived late to group and stayed for remainder. Pt sat among peers to novel cognitive activity and engaged in brief social interactions with peers. Pt initially appeared to be able to follow verbal directions for activity but as task progressed she appeared confused about the responses she had written. Pt was able to identify several unique responses. Pt verbalized understanding of importance of cognitive wellness in a healthy lifestyle and reported they enjoyed the activity.

## 2024-01-06 NOTE — PHARMACY-ADMISSION MEDICATION HISTORY
Pharmacy Intern Admission Medication History    Admission medication history is complete. The information provided in this note is only as accurate as the sources available at the time of the update.    Information Source(s): Patient and CareEverywhere/SureScripts via phone    Pertinent Information:   Per patient, she is not adherent to her atorvastatin 10 mg tablet because she is concerned about the side effects.   Per patient, she takes gabapentin 100 mg by mouth, 1 or 2 capsules daily based on how much pain she is in.     Changes made to PTA medication list:  Added:   Gabapentin 100 mg capsule   Ibuprofen 600 mg tablet   Hydroxyzine 25 mg tablet   Deleted:   Melatonin 3 mg tablet   Multivitamin w/minerals tablet   Riboflavin 100 mg capsule   Vitamin E 1000 units capsule   Changed:   Duloxetine 30 mg capsule: take 1 capsule PO BID --> take 1 capsule PO every day   Cranberry 500 mg capsule: take 1 capsule PO --> take 1 capsule PO every day   Biotin 1000 mcg chew: chew by mouth --> take 1000 mcg by mouth every day   Menatetrenone (VITAMIN K2) 100 mcg tablet: take 1 tablet PO every day  Previously no sig included   Tumeric 500 mg capsule: take 1 tablet PO every day   Previously no sig included     Allergies reviewed with patient and updates made in EHR: no    Medication History Completed By: Moises Cabrales 1/6/2024 2:43 PM    PTA Med List   Medication Sig Last Dose    ASHWAGANDHA PO Take 300 mg by mouth daily Unknown    atorvastatin (LIPITOR) 10 MG tablet Take 10 mg by mouth At Bedtime Past Week    Biotin 1000 MCG CHEW Take 1,000 mcg by mouth daily Unknown    Cranberry (RA CRANBERRY) 500 MG CAPS Take 500 mg by mouth daily Unknown    DULoxetine (CYMBALTA) 30 MG capsule Take 30 mg by mouth daily 1/5/2024    DULoxetine (CYMBALTA) 60 MG capsule Take 60 mg by mouth every evening 1/5/2024    fluticasone (FLONASE) 50 MCG/ACT nasal spray Spray 1 spray into both nostrils daily Unknown    gabapentin (NEURONTIN) 100 MG capsule  Take 100-200 mg by mouth daily Past Week    hydrOXYzine (ATARAX) 10 MG tablet Take 10-20 mg by mouth every 8 hours as needed for anxiety or other (sleep) Past Week    hydrOXYzine HCl (ATARAX) 25 MG tablet Take 25 mg by mouth every 8 hours as needed for itching Past Week    ibuprofen (ADVIL/MOTRIN) 600 MG tablet Take 600 mg by mouth every 4 hours as needed for moderate pain Past Week    magnesium oxide 400 MG CAPS Take 1 tablet by mouth 2 times daily 1/4/2024    Menatetrenone (VITAMIN K2) 100 MCG TABS Take 100 mcg by mouth daily Unknown    methocarbamol (ROBAXIN) 750 MG tablet Take 1 tablet by mouth every 6 hours as needed for muscle spasms 1/4/2024    Potassium Citrate,Elemental K, 99 MG CAPS Take 1 capsule by mouth daily     thiamine (B-1) 100 MG tablet Take 1 tablet (100 mg) by mouth daily 1/4/2024    Turmeric 500 MG CAPS Take 1 capsule by mouth daily Unknown    vitamin A 3 MG (38779 UNITS) capsule Take 10,000 Units by mouth daily 1/4/2024    vitamin D3 (CHOLECALCIFEROL) 125 MCG (5000 UT) tablet Take 5,000 Units by mouth daily 1/2/2024

## 2024-01-07 LAB
CHOLEST SERPL-MCNC: 176 MG/DL
FOLATE SERPL-MCNC: 11.4 NG/ML (ref 4.6–34.8)
GGT SERPL-CCNC: 25 U/L (ref 5–36)
HBA1C MFR BLD: 5.4 %
HCG UR QL: NEGATIVE
HDLC SERPL-MCNC: 65 MG/DL
LDLC SERPL CALC-MCNC: 93 MG/DL
NONHDLC SERPL-MCNC: 111 MG/DL
PHOSPHATE SERPL-MCNC: 4.1 MG/DL (ref 2.5–4.5)
TRIGL SERPL-MCNC: 89 MG/DL
TSH SERPL DL<=0.005 MIU/L-ACNC: 3.3 UIU/ML (ref 0.3–4.2)
VIT B12 SERPL-MCNC: 773 PG/ML (ref 232–1245)

## 2024-01-07 PROCEDURE — 82746 ASSAY OF FOLIC ACID SERUM: CPT | Performed by: REGISTERED NURSE

## 2024-01-07 PROCEDURE — 84443 ASSAY THYROID STIM HORMONE: CPT | Performed by: REGISTERED NURSE

## 2024-01-07 PROCEDURE — 82977 ASSAY OF GGT: CPT | Performed by: REGISTERED NURSE

## 2024-01-07 PROCEDURE — 83036 HEMOGLOBIN GLYCOSYLATED A1C: CPT | Performed by: REGISTERED NURSE

## 2024-01-07 PROCEDURE — H2032 ACTIVITY THERAPY, PER 15 MIN: HCPCS

## 2024-01-07 PROCEDURE — 99254 IP/OBS CNSLTJ NEW/EST MOD 60: CPT | Performed by: NURSE PRACTITIONER

## 2024-01-07 PROCEDURE — 128N000004 HC R&B CD ADULT

## 2024-01-07 PROCEDURE — 250N000013 HC RX MED GY IP 250 OP 250 PS 637: Performed by: REGISTERED NURSE

## 2024-01-07 PROCEDURE — 36415 COLL VENOUS BLD VENIPUNCTURE: CPT | Performed by: REGISTERED NURSE

## 2024-01-07 PROCEDURE — 81025 URINE PREGNANCY TEST: CPT | Performed by: REGISTERED NURSE

## 2024-01-07 PROCEDURE — 82607 VITAMIN B-12: CPT | Performed by: REGISTERED NURSE

## 2024-01-07 PROCEDURE — 80061 LIPID PANEL: CPT | Performed by: REGISTERED NURSE

## 2024-01-07 PROCEDURE — 84100 ASSAY OF PHOSPHORUS: CPT | Performed by: NURSE PRACTITIONER

## 2024-01-07 RX ADMIN — DIAZEPAM 10 MG: 5 TABLET ORAL at 04:23

## 2024-01-07 RX ADMIN — POTASSIUM CITRATE 5 MEQ: 5 TABLET ORAL at 09:09

## 2024-01-07 RX ADMIN — FOLIC ACID 1 MG: 1 TABLET ORAL at 09:08

## 2024-01-07 RX ADMIN — Medication 1 TABLET: at 09:07

## 2024-01-07 RX ADMIN — DULOXETINE HYDROCHLORIDE 60 MG: 60 CAPSULE, DELAYED RELEASE ORAL at 20:45

## 2024-01-07 RX ADMIN — Medication 125 MCG: at 09:07

## 2024-01-07 RX ADMIN — MAGNESIUM OXIDE TAB 400 MG (241.3 MG ELEMENTAL MG) 400 MG: 400 (241.3 MG) TAB at 20:46

## 2024-01-07 RX ADMIN — METHOCARBAMOL 750 MG: 750 TABLET ORAL at 18:13

## 2024-01-07 RX ADMIN — ACETAMINOPHEN 650 MG: 325 TABLET, FILM COATED ORAL at 16:22

## 2024-01-07 RX ADMIN — DIAZEPAM 10 MG: 5 TABLET ORAL at 00:58

## 2024-01-07 RX ADMIN — ATORVASTATIN CALCIUM 10 MG: 10 TABLET, FILM COATED ORAL at 20:46

## 2024-01-07 RX ADMIN — DIAZEPAM 10 MG: 5 TABLET ORAL at 09:08

## 2024-01-07 RX ADMIN — DIAZEPAM 10 MG: 5 TABLET ORAL at 12:03

## 2024-01-07 RX ADMIN — GABAPENTIN 200 MG: 100 CAPSULE ORAL at 09:08

## 2024-01-07 RX ADMIN — DULOXETINE HYDROCHLORIDE 30 MG: 30 CAPSULE, DELAYED RELEASE ORAL at 09:08

## 2024-01-07 RX ADMIN — Medication 10000 UNITS: at 09:08

## 2024-01-07 RX ADMIN — THIAMINE HCL TAB 100 MG 100 MG: 100 TAB at 09:09

## 2024-01-07 RX ADMIN — MAGNESIUM OXIDE TAB 400 MG (241.3 MG ELEMENTAL MG) 400 MG: 400 (241.3 MG) TAB at 09:08

## 2024-01-07 ASSESSMENT — ACTIVITIES OF DAILY LIVING (ADL)
DRESS: INDEPENDENT
ORAL_HYGIENE: INDEPENDENT
ADLS_ACUITY_SCORE: 30
HYGIENE/GROOMING: INDEPENDENT
DRESS: INDEPENDENT
LAUNDRY: WITH SUPERVISION
ADLS_ACUITY_SCORE: 30
ORAL_HYGIENE: INDEPENDENT
ADLS_ACUITY_SCORE: 30
HYGIENE/GROOMING: INDEPENDENT
ADLS_ACUITY_SCORE: 30
ADLS_ACUITY_SCORE: 30

## 2024-01-07 NOTE — PLAN OF CARE
Behavioral Team Discussion: (1/7/2024)    Continued Stay Criteria/Rationale: Patient admitted for Chemical Use Issues.  Plan: The following services will be provided to the patient; psychiatric assessment, medication management, therapeutic milieu, individual and group support, and skills groups.   Participants: 3A Provider: Dr. Yeni Hand MD; 3A RN:  Saud York RN; 3A CM's:  Sofie Valdez .  Summary/Recommendation: Providers will assess today for treatment recommendations, discharge planning, and aftercare plans. CM will meet with pt for discharge planning.   Medical/Physical: No medical/physical health concerns reported by pt.   Precautions:   Behavioral Orders   Procedures    Code 1 - Restrict to Unit    Routine Programming     As clinically indicated    Status 15     Every 15 minutes.    Withdrawal precautions     Rationale for change in precautions or plan: N/A  Progress: Initial.    ASAM Dimension Scale Ratings:  Dimension 1: 2 Client has some difficulty tolerating and coping with withdrawal discomfort. Client's intoxication may be severe, but responds to support and treatment such that the client does not immediately endanger self or others. Client displays moderate signs and symptoms with moderate risk of severe withdrawal.  Dimension 2: 1 Client tolerates and lotus with physical discomfort and is able to get the services that the client needs.  Dimension 3: 2 Client has difficulty with impulse control and lacks coping skills. Client has thoughts of suicide or harm to others without means; however, the thoughts may interfere with participation in some treatment activities. Client has difficulty functioning in significant life areas. Client has moderate symptoms of emotional, behavioral, or cognitive problems. Client is able to participate in most treatment activities.  Dimension 4: 2 Client displays verbal compliance, but lacks consistent behaviors; has low motivation for change; and is passively  involved in treatment.  Dimension 5: 3 Client has poor recognition and understanding of relapse and recidivism issues and displays moderately high vulnerability for further substance use or mental health problems. Client has few coping skills and rarely applies coping skills.  Dimension 6: 3 Client is not engaged in structured, meaningful activity and the client's peers, family, significant other, and living environment are unsupportive, or there is significant criminal justice system involvement.

## 2024-01-07 NOTE — CONSULTS
"[facility]  Consult Note - Hospitalist Service  Date of Admission:  1/6/2024  Consult Requested by: Josseline Lawrence  Reason for Consult: alcohol detox    Assessment & Plan   Holly Briseno is a 53 year old female admitted on 1/6/2024. She has history of migraine, depression, anxiety, melanoma of upper back (2021), alcohol use disorder. Presented to ED with alcohol intoxication and admitted voluntarily to detox unit. Hospital medicine consulted for general medical evaluation.    #alcohol use disorder, severe, dependence  Reports drinking \"a lot\" of alcohol on a 4-5-day binge per ED documentation.  Last drink 1/6. No - history of alcohol withdrawal seizure. No - history of delirium tremens.    -alcohol withdrawal protocol (CIWA) with Diazepam (Valium)            -check magnesium, phosphorus    -electrolyte replacement    -multivitamin, folate, thiamine    #chronic myofascial pain    -continue duloxetine 90mg daily in divided dose    -continue gabapentin 200mg daily    #depression  #anxiety    -treatment per psychiatry: continue duloxetine 90mg daily in divided dose, hydroxyzine prn for acute anxiety    Clinically Significant Risk Factors Present on Admission          # Hypocalcemia: Lowest Ca = 7.5 mg/dL in last 2 days, will monitor and replace as appropriate    # Anion Gap Metabolic Acidosis, resolved: Highest Anion Gap = 20 mmol/L in last 2 days, resolved at outside ED with fluids                      Hospital medicine will sign off at this time. Please page for any acute concern or question.    KYREE Ratliff [facility]  Hospitalist Service  Securely message with [employer] (more info)  Text page via [facility] Paging/Directory   ______________________________________________________________________    Chief Complaint   Alcohol binge    History is obtained from the patient    History of Present Illness   Holly Briseno is a 53 year old female who has history of " migraine, depression, anxiety, melanoma of upper back (2021), alcohol use disorder. She presented to ED with alcohol intoxication after an episode of binge drinking for 4-5 days. She was admitted to 3A detox unit for alcohol withdrawal.    Patient seen in her room on detox unit. Patient is asleep, not alert for assessment - stopped by her room twice. She does not appear in any distress, breathing non-labored and even respirations. Vital signs, notes, labs, and past medical history reviewed.      Past Medical History    Past Medical History:   Diagnosis Date    Alcoholism (H)     Anxiety     Cancer (H) September 2021    Melanoma    Depressive disorder February 3 2021       Past Surgical History   Past Surgical History:   Procedure Laterality Date    BIOPSY  September 2021    Melanoma       Medications   I have reviewed this patient's current medications          Physical Exam   Vital Signs: Temp: 98.2  F (36.8  C) Temp src: Temporal BP: (!) 143/85 Pulse: 82   Resp: 14 SpO2: 98 % O2 Device: None (Room air)    Weight: 139 lbs 0 oz    Physical Exam  Constitutional:       General: She is sleeping. She is not in acute distress.  Cardiovascular:      Rate and Rhythm: Normal rate.      Comments: Normal rate recorded by staff this AM  Pulmonary:      Effort: Pulmonary effort is normal. No respiratory distress.       Medical Decision Making       35 MINUTES SPENT BY ME on the date of service doing chart review, history, exam, documentation & further activities per the note.      Data     I have personally reviewed the following data over the past 24 hrs:    TSH: 3.30 T4: N/A A1C: 5.4

## 2024-01-07 NOTE — PLAN OF CARE
"Patient visible in milieu, social with peers. Affect neutral, mood is anxious. Denies SI, SIB, HI, or hallucinations.  Continues to be monitored for alcohol withdrawal. MSSA scores 8 and 8, was given prn Valium 10 mg times 2.   VSS, appetite good, drinking fluids. Reported headache pain 7/10, was given prn Tylenol 650 mg times one. Patient reduced to 4/10.  Patient gait now steady and declined to use the walker. Writer observed steady gait and agreed.   Patient discharge plans are to return home and resume her IOP program for CD.  Patient denies any additional concerns.  BP (!) 151/92 (BP Location: Left arm)   Pulse 94   Temp 97.5  F (36.4  C) (Temporal)   Resp 16   Ht 1.651 m (5' 5\")   Wt 63 kg (139 lb)   SpO2 97%   BMI 23.13 kg/m     Problem: Substance Withdrawal  Goal: Substance Withdrawal  Description: Signs and symptoms of listed problems will be absent or manageable.  Outcome: Progressing  Goal: Social and Therapeutic (Substance Withdrawal)  Description: Signs and symptoms of listed problems will be absent or manageable.  Outcome: Progressing  Intervention: Substance Withdrawal  Recent Flowsheet Documentation  Taken 1/6/2024 1907 by Sofía Fregoso RN  Substance Withdrawal Interventions:   interventions implemented as appropriate   monitor substance withdrawal process   encourage nutrition and hydration   maintain safety precautions   maintain safe secure environment   assess patient response to medication   assess medication adherance  Alcohol Withdrawl Interventions:   monitor need for prn medication   assist patient in following safety plan   provide emotional support   establish therapeutic relationship   monitor decision making ability  Intervention: Social and Therapeutic Interv (Substance Withdrawal)  Recent Flowsheet Documentation  Taken 1/6/2024 1907 by Sofía Fregoso RN  Social and Therapeutic Interventions (Substance Withdrawal):   encourage socialization with peers   encourage effective " boundaries with peers   encourage participation in therapeutic groups and milieu activities   Goal Outcome Evaluation:    Plan of Care Reviewed With: patient

## 2024-01-07 NOTE — DISCHARGE INSTRUCTIONS
Behavioral Discharge Planning and Instructions  THANK YOU FOR CHOOSING [facility]  3AW  [phone]    Summary: You were admitted to Station 3A on 1/6/24 for detoxification from alcohol.  A medical exam was performed that included lab work. You have met with a  and opted to attend IOP at [facility].  Please take care and make your recovery a daily priority, Holly!  It was a pleasure working with you and the entire treatment team here wishes you the very best in your recovery!     Recommendation:  Outpatient CD Treatment at [facility]. Refrain from use of mood altering substances. If you are unable to maintain sobriety in the community then please obtain a chemical health assessment and follow all recommendations.  To schedule an assessment:  Call the [facility] assessment center at [phone] and ask to schedule a Chemical Health Assessment.     You can also call your [facility] Chemical Dependency unit (these are usually included under adult mental health services). Most [facility] have a number for you to call to schedule an assessment.  If you have private insurance or a PMAP you can call the customer service number on your insurance  card and they can give you a list of places to call for an assessment that are in network with your  insurance.       To find a treatment program:     SAMHSA.gov  Click on find treatment  Enter your zip code and select your city/state.  The Substance Abuse and Mental Health Services Administration (SAMHSA) is the agency within  the U.S. Department of Health and Human Services that leads public health efforts to advance the  behavioral health of the nation. SAMHSA's mission is to reduce the impact of substance abuse and  mental illness on [place] communities.     Hubskipn.org select substance use disorder programs then select find services.  Fast Tracker is a virtual community and health care connection resource. We connect  individuals, families, mental  health and substance use disorder providers, physicians, care coordinators,  and others with a real-time, searchable directory of mental health and substance use disorder resources  and their availability within [place].    If you have any questions at anytime after you are discharged please call [facility] detox unit 3AW at [phone].  [facility], Behavioral Intake [phone]  Medical Records call [phone]  Outpatient Behavioral Intake call [phone]  LP+ Wait List/Bed Availability call [phone]        Main Diagnoses:  Per Dr. Yazan MD;  303.90 (F10.20) Alcohol Use Disorder Severe    Major Treatments, Procedures and Findings:  You were treated for *** detoxification using ***. As an outpatient you will be prescribed ***, please take this medication as prescribed until it is gone. You did not need a chemical dependency assessment. You had labs drawn and those results were reviewed with you. Please take a copy of your lab work with you to your next primary care provider appointment.    Symptoms to Report:  If you experience more anxiety, confusion, sleeplessness, deep sadness or thoughts of suicide, notify your treatment team or notify your primary care provider. IF ANY OF THE SYMPTOMS YOU ARE EXPERIENCING ARE A MEDICAL EMERGENCY CALL 911 IMMEDIATELY.     Lifestyle Adjustment: Adjust your lifestyle to get enough sleep, relaxation, exercise and good nutrition. Continue to develop healthy coping skills to decrease stress and promote a sober living environment. Do not use mood altering substances including alcohol, illegal drugs or addictive medications other than what is currently prescribed.     Disposition: Home    Facts about COVID19 at www.cdc.gov/COVID19 and www.[place].gov/covid19    Keeping hands clean is one of the most important steps we can take to avoid getting sick and spreading germs to others.  Please wash your hands frequently and lather with soap for at least 20  seconds!    Follow-up Appointment:   Appointment Date/Time: ***    Psychiatrist/Primary Care Giver: ***    Address: ***    Phone Number: ***      Patient organized her own admission to [employer]    Recovery apps for your phone to locate current in person and zoom recovery meetings  [place] - meeting kev  AA  - meeting kev  Meeting guide - meeting kev  Quick NA meeting - meeting kev  Michael- has various apps    Resources:  Some AA/NA meetings are being held online however most have returned to in-person or a hybrid combination please check online to verify*  Need Support Now? If you or someone you know is struggling or in crisis, help is available. Call or text 513 or chat [facility]  AA meetings search for them at: https://aa-intergroup.org (worldwide meeting listings)  AA meetings for [place] area can be found online at: https://a[place].org (click local online meetings listings)  NA meetings for [place] area can be found online at: https://www.nam[place].org  (click find a meeting)  AA and NA Sponsors are excellent resources for support and you can find one at any support group meeting.   Alcoholics Anonymous (https://aa.org/): for information 24 hours/day  AA Intergroup service office in [place] (http://www.aastpaul.org/) 365.564.6878  AA Intergroup service office in [facility]: [phone]. (http://www.aamin[place].org/)  Narcotics Anonymous (www.nam[place].org) (566) 176-6338  https://aafairviewriverside.org/meetings  SMART Recovery - self management for addiction recovery:  www.smartrecovery.org  Pathways ~ A Health Crisis Resource & Support Center:  979.672.3196.  https://prescribetoprevent.org/patient-education/videos/  http://www.harmreduction.org  Crisis Text Line  Text 278568  You will be connected with a trained live crisis counselor to provide support. Por espanol, texto  DELBERT a 851271 o texto a 442-AYUDAME en [facility] Hope Line  1.800.SUICIDE  "[3209078]  [facility] 805-031-0998  Support Group:  AA/NA and Sponsor/support.  Fast Tracker  Linking people to mental health and substance use disorder resources  [employer]   [place] Mental Health Warm Line  Peer to peer support  Monday thru Saturday, 12 pm to 10 pm  812.319.0466 or 1.714.713.3505  Text \"Support\" to 06984  National [place] on Mental Illness (MARBIN)  505.759.0685 or 1.888.MARBIN.HELPS  Alcoholics Anonymous (www.alcoholics-anonymous.org): Check your phone book for your local chapter.  Suicide Awareness Voices of Education (SAVE) (www.save.org): 161-020-XTLU (2183)  National Suicide Prevention Line (www.mentalhealthmn.org): 392-203-BJFJ (4348)  Mental Health Consumer/Survivor Network of [place] (www.Rock My Worldsn.net): 693.243.3721 or 927-781-2004  Mental Health Association of [place] (www.mentalhealth.org): 715.470.3790 or 642-459-6030   Substance Abuse and Mental Health Services (www.samhsa.gov)  [place] Opioid Prevention Coalition: www.opioidcoalition.org    [place] Recovery Connection (MRC)  [facility] connects people seeking recovery to resources that help foster and sustain long-term recovery.  Whether you are seeking resources for treatment, transportation, housing, job training, education, health care or other pathways to recovery, [facility] is a great place to start.  938.654.6949.  www.[place].org    Great Pod casts for nutrition and wellness  Listen on Apple Podcasts  Dishing Up Nutrition   [employer]   Nutrition       Understand the connection between what you eat and how you feel. Hosted by licensed nutritionists and dietitians from [employer] we share practical, real-life solutions for healthier living through nutrition.     General Medication Instructions:   See your medication sheet(s) for instructions.   Take all medications as prescribed.  Make no changes unless your primary care provider suggests them.   Go to all your " primary care provider visits.  Be sure to have all your required lab tests. This way, your medicines can be refilled on time.  Do not use any forms of alcohol.    Please Note:  If you have any questions at anytime after you are discharged please call [facility] detox unit 3AW at [phone].  [facility], Behavioral Intake [phone]  Medical Records call [phone]  Outpatient Behavioral Intake call [phone]  LP+ Wait List/Bed Availability call [phone]    Please remember to take all of your behavioral discharge planning and lab paperwork to any follow up appointments, it contains your lab results, diagnosis, medication list and discharge recommendations.      THANK YOU FOR CHOOSING [facility]

## 2024-01-07 NOTE — PLAN OF CARE
Problem: Substance Withdrawal  Goal: Substance Withdrawal  Description: Signs and symptoms of listed problems will be absent or manageable.  Outcome: Progressing     Problem: Sleep Disturbance  Goal: Adequate Sleep/Rest  Outcome: Progressing   Goal Outcome Evaluation:    The Patient slept well and received Valium 10 mg twice. MSSA scores were 8 and 8. Safety checks are being conducted every fifteen minutes with no related incidents.

## 2024-01-07 NOTE — PROGRESS NOTES
Writer attempted to meet with Pt X's 3 however she was asleep and her roommate was also asleep and writer would be un able to meet with her confidentially.

## 2024-01-07 NOTE — PLAN OF CARE
Problem: Substance Withdrawal  Goal: Substance Withdrawal  Description: Signs and symptoms of listed problems will be absent or manageable.  Outcome: Progressing   Goal Outcome Evaluation:    Plan of Care Reviewed With: patient       Pt has spent most of her day in bed.  She has complained of feeling tired and sleepy but her mobility and gait have improved.  Pt is no longer walking with a walker.  Pt's appetite has improved--eating 75% of meals, mildly diaphoretic, tremulous.  Pt reports that her thinking is clearing and she feels calmer.  Pt is alert and oriented X 3, with no thoughts of SI or SIB.  Pt says that she would like to return to her IOP group upon discharge and she has been cleared by the group leadership to do so.  Pt has been involved with the [place].  Will continue to monitor withdrawal and assist with discharge plans.

## 2024-01-08 VITALS
OXYGEN SATURATION: 98 % | SYSTOLIC BLOOD PRESSURE: 131 MMHG | DIASTOLIC BLOOD PRESSURE: 86 MMHG | WEIGHT: 139 LBS | BODY MASS INDEX: 23.16 KG/M2 | TEMPERATURE: 97.1 F | HEIGHT: 65 IN | HEART RATE: 79 BPM | RESPIRATION RATE: 16 BRPM

## 2024-01-08 PROCEDURE — 250N000013 HC RX MED GY IP 250 OP 250 PS 637: Performed by: REGISTERED NURSE

## 2024-01-08 PROCEDURE — 99239 HOSP IP/OBS DSCHRG MGMT >30: CPT | Performed by: PSYCHIATRY & NEUROLOGY

## 2024-01-08 PROCEDURE — H2035 A/D TX PROGRAM, PER HOUR: HCPCS | Mod: HQ

## 2024-01-08 RX ADMIN — DULOXETINE HYDROCHLORIDE 30 MG: 30 CAPSULE, DELAYED RELEASE ORAL at 09:01

## 2024-01-08 RX ADMIN — Medication 10000 UNITS: at 09:01

## 2024-01-08 RX ADMIN — THIAMINE HCL TAB 100 MG 100 MG: 100 TAB at 09:01

## 2024-01-08 RX ADMIN — Medication 1 TABLET: at 09:01

## 2024-01-08 RX ADMIN — POTASSIUM CITRATE 5 MEQ: 5 TABLET ORAL at 09:01

## 2024-01-08 RX ADMIN — Medication 125 MCG: at 09:01

## 2024-01-08 RX ADMIN — GABAPENTIN 200 MG: 100 CAPSULE ORAL at 09:01

## 2024-01-08 RX ADMIN — FOLIC ACID 1 MG: 1 TABLET ORAL at 09:01

## 2024-01-08 RX ADMIN — MAGNESIUM OXIDE TAB 400 MG (241.3 MG ELEMENTAL MG) 400 MG: 400 (241.3 MG) TAB at 09:01

## 2024-01-08 ASSESSMENT — ACTIVITIES OF DAILY LIVING (ADL)
ADLS_ACUITY_SCORE: 30

## 2024-01-08 NOTE — PLAN OF CARE
Goal Outcome Evaluation:    Plan of Care Reviewed With: patient      The patient was visible in the gates and [place] as a whole. However, she returned to her room to relax whenever needed and did not interact much with others. The patient denied any suicidal ideation, homicidal ideation, auditory or visual hallucinations, anxiety, depression, or pain. She mentioned eating, sleeping, and using the restroom were without issues. Her vital signs were within normal limits, except the blood pressure was slightly high at 135/90 and 145/87. She took her medications and experienced no adverse reactions. Her MSSA) scores were 4 and 2. She is no longer in detox. She has a discharge order for this evening as her daughter will not be available until around 5 PM.

## 2024-01-08 NOTE — PLAN OF CARE
Problem: Substance Withdrawal  Goal: Substance Withdrawal  Description: Signs and symptoms of listed problems will be absent or manageable.  Outcome: Progressing     Problem: Sleep Disturbance  Goal: Adequate Sleep/Rest  Outcome: Progressing   Goal Outcome Evaluation:    The Pt slept for 7 hours, and her MSSA score was 3; hence, she received no Valium. The fifteen-minute safety checks are in progress with no related incidents. She last took Valium at 12:10 pm on 1/7/24.

## 2024-01-08 NOTE — CARE PLAN
01/07/24 1800   Group Therapy Session   Group Attendance attended group session   Time Session Began 1645   Time Session Ended 1730   Total Time (minutes) 45   Total # Attendees 6   Group Type expressive therapy   Group Topic Covered emotions/expression   Patient Response/Contribution cooperative with task     Art Therapy directive was to create art in response to an affirmation with the theme of past, present and future.  Goals of directive: to create a visual self narrative, emotional expression, identifying personal strengths and goals, assessing pts motivation for change/future focused.  Pt was an engaged participant, focused on task for the full duration of group.  Pt finished artwork and briefly verbally processed with author and group.  Pt blake a liquor bottle for her past-writing a quote on the image that expresses how drinking has led to negative consequences for pt. Pt blake a butterfly as a self symbol for present/future.  Pts mood was calm, pleasant participant.

## 2024-01-08 NOTE — PLAN OF CARE
"Patient visible in the milieu, social with peers, attended offered groups. Affect bright, mood is calm. Denies SI, SIB, HI, or hallucinations.  VSS, appetite good, drinking fluids. Reported chronic neck pain 7/10. Was given prn Tylenol 650 mg times one. Pain down to 5/10. Was also given prn Robaxin 750 mg times one for chronic neck pain 5/10. Reported pain 4/10 after administration.  Patient was offered eduction materials for anti-craving medications per order. Patient declined materials as she reports she does not have alcohol cravings when sober.   Patient reports she plans to resume her [facility] program after discharge. Would be interested in discharge tomorrow if out of detox. States should wouldn't have a ride until after 5 pm.   Patient denies any additional concerns.  /79 (BP Location: Left arm, Patient Position: Sitting, Cuff Size: Adult Regular)   Pulse 74   Temp 97.8  F (36.6  C) (Oral)   Resp 16   Ht 1.651 m (5' 5\")   Wt 63 kg (139 lb)   SpO2 99%   BMI 23.13 kg/m     Problem: Substance Withdrawal  Goal: Substance Withdrawal  Description: Signs and symptoms of listed problems will be absent or manageable.  Outcome: Progressing  Goal: Social and Therapeutic (Substance Withdrawal)  Description: Signs and symptoms of listed problems will be absent or manageable.  Outcome: Progressing  Intervention: Substance Withdrawal  Recent Flowsheet Documentation  Taken 1/7/2024 1728 by Sofía Fregoso RN  Substance Withdrawal Interventions:   interventions implemented as appropriate   monitor substance withdrawal process   encourage nutrition and hydration   maintain safety precautions   assess patient response to medication   assess medication adherance  Alcohol Withdrawl Interventions:   monitor need for prn medication   assist patient in following safety plan   encourage participation / independence with adls   provide emotional support   monitor decision making ability   assist with developing and utilizing " coping strategies  Intervention: Social and Therapeutic Interv (Substance Withdrawal)  Recent Flowsheet Documentation  Taken 1/7/2024 1728 by Sofía Fregoso, RN  Social and Therapeutic Interventions (Substance Withdrawal):   encourage socialization with peers   encourage participation in therapeutic groups and milieu activities   encourage effective boundaries with peers   Goal Outcome Evaluation:    Plan of Care Reviewed With: patient

## 2024-01-08 NOTE — DISCHARGE SUMMARY
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 [phone]  Outpatient Behavioral Intake call [phone]  LP+ Wait List/Bed Availability call [phone]      Disposition: Home        Attestation:  Patient has been seen and evaluated by me, Yeni Hand DO on day of discharge. 35 minutes were spent in coordination of discharge planning.

## 2024-01-09 NOTE — CARE PLAN
01/08/24 2155   Group Therapy Session   Group Attendance attended group session   Time Session Began 1645   Time Session Ended 1730   Total Time (minutes) 45   Total # Attendees 5   Group Type psychotherapeutic;addiction   Group Topic Covered disease of addiction/choices in recovery;emotions/expression;structured socialization;self-care activities   Group Session Detail Process- self compassion   Patient Response/Contribution cooperative with task;discussed personal experience with topic;expressed readiness to alter behaviors;listened actively   Patient Participation Detail mood hopeful, said she felt tired today. She noted she knew her trigger this relapse was a dishonest new relationship. She said she felt like she was headed in the right direction, returning to IOP

## 2024-01-09 NOTE — PLAN OF CARE
"Patient out of detox at beginning of shift. Patient VSS, affect brighter. Mood is calm. Denies SI, SIB, HI, or hallucinations. Patient discharged to home via ambulatory at 1800. Discharge instructions reviewed with patient. Patient verbalized understanding. Belongings returned upon discharge. Patient denies any additional concerns.  /86 (BP Location: Left arm)   Pulse 79   Temp 97.1  F (36.2  C) (Temporal)   Resp 16   Ht 1.651 m (5' 5\")   Wt 63 kg (139 lb)   SpO2 98%   BMI 23.13 kg/m                           "

## 2024-07-10 NOTE — PROGRESS NOTES
RETURN PATIENT RHEUMATOLOGY VISIT     Assessment & Plan     Problem List  Arthralgias and Myalgias   Alcohol use disorder  Depression/Anxiety     Arthralgia  Myalgia  Comment: Based on her history, review of systems and exam, I am not concerned for an underlying autoimmune or autoinflammatory disease. The character of her pain fits myofascial pain. May also have a component of pain amplification. X-rays of cervical spine and SI joints reassuring. Inflammatory markers are wnl.     We discussed the pathophysiology of pain amplification on our initial visit as well as multi-faceted approach to treatment with emphasis on gradual increase in activity. Also discussed additional supports including cognitive behavior therapy and mindfulness practices. Discussed role of medications such as Cymbalta, which she is already on.     Plan:   -Will check inflammatory markers again today as well as CK  -MRI cervical spine  -She requests a referral to [facility] which I am in agreement with and have placed  -If the above work up is reassuring, follow-up as needed for new or changing symptoms concerning for underlying autoimmune disease  -Continue to follow with pain clinic.  Her pain has been a trigger for her alcohol use in the past and she would benefit from adequate pain control. Recommended she discuss Subutex with pain clinic.            Alcohol use disorder  Comment: Has hx of long term sobriety (8 years) but with recent relapse(s) after trigger of her 's death. Recently completed inpatient treatment.   Plan: Continue to abstain from alcohol.        Orders Placed This Encounter   Procedures    CK total    Erythrocyte sedimentation rate auto    CRP inflammation    Adult Rheumatology  Referral          40 minutes spent on the day of the encounter doing chart review, history and exam, documentation and further activities as noted above.     Subjective     Reports ongoing muscle pain throughout. Pain is over  shoulders, back of neck, head, lower back. Cymbalta has not touched the pain. Reports she take muscle relaxants, ibuprofen and tylenol daily.         She had a relapse since I last saw her and checked herself into inpatient treatment.  She reports this was the best decision she is ever made.  She feels she is on the right path and has remained sober since discharge.  She submitted paperwork for pain clinic and is waiting for an appointment.  She continues to have pain over the back of her head which moves down her neck and into her shoulders as well as her clavicle and scapula.  She is also started having jaw pain which worsens towards the end of the day.  Movement of her neck and shoulders triggers bad headaches.  She reports she has had migraines since the age of 21 and they have worsened over the years but more recently have stabilized.  No recent change in her headaches.  No vision changes.  Does get some temporal tension but no scalp tenderness.  She reports some pain over the lateral aspect of her thighs after walking for more than 30 minutes.  The pain is usually asymmetric.  She continues to take about 2000 mg of ibuprofen a day.  She is aware that this is not good for her but does not feel like she has any other options as significant pain is a trigger for her drinking.  She is hopeful that she will be able to find some new modalities and pain clinic.  During inpatient treatment she also learned about biofeedback and mindfulness and is starting to develop those into her life.        HPI    Patient is a 53-year-old female with a history of depression and anxiety, melanoma, alcohol use disorder, and chronic pain.  She presents today for evaluation for pain which has been worsening over the last 15 years.  She reports her pain started in her late 20s and escalated after her first pregnancy. She notes her problem areas tend to be her neck and upper back as well as her hips.  She gets pain that starts in the  back of her head and moves down to her neck and shoulders and upper back and often instigates bad migraines.  She also reports pain in her jaw intermittently with tightness.  She still walks 4 miles a day but has started to have pain in her hips and soreness after walking a couple blocks that limits her activity and sometimes causes her to limp.  Besides physical therapy she does not do any other physical activity.  She has tried heat, warm baths with essential oils, massage, acupuncture, dry needling and cupping, which all provide some relief but nothing lasting.  Her migraines started to get worse in her 30s and she has seen 3 different neurologists for this.  She has had nuchal shots and a cortisone shot in her neck, both of which made her migraines worse.  She has seen a chiropractor and tried abortive medications as well as multiple trials of physical therapy.  She feels that movement makes her pain worse and will often have flares of her pain after physical therapy sessions or after doing chores around the house like vacuuming.  She does not feel she is overly flexible; in fact feels stiff.  For her pain, she takes Ibuprofen 800mg 2-3x/day for 20 years, tylenol prn, robaxin every 6 hours, cymbalta daily, atarax as needed.  She is aware of the risks of taking chronic NSAIDs including heart, kidney, GI effects, but feels the pain is too bad without it.  She has never been to a pain clinic.  She reports she has had multiple images including x-rays, MRIs, and CT scans of her neck and hips in the past.    She adheres to an anti-inflammatory diet.  She feels that she does not have issues with sleep and feels rested when she wakes up.  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  Review of systems is positive for Raynaud's starting in her 20s with lip and toe color discoloration to blue in the cold.  Does not have color changes in her fingers. No oral or nasal  ulcers, no hx of inflammatory eye disease, no rashes, no joint swelling, no GERD, no blood in urine or stool, no dyspnea, no cough, no chest pain, no fevers or weigh loss, no hx of blood clots or miscarriages, preg x2, no dry eyes or dry mouth, +occasional arm numbness and legs fall asleep occasionally, +muscle pain.    She has alcohol use disorder and has had a period of sobriety of 8 years but had a relapse last summer after the death of her second  30 days after their wedding.  She has been sober for the last 2-1/2 weeks.  She has an addiction counselor as well as a mental health therapist.  She has 2 children, daughter and a son in their 20s, who are supportive of her.    No family or personal Hx of known autoimmune disease  Not a previous smoker. Not a current smoker.   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      Lab and Imaging review:    I reviewed recent labs and imaging including:  BMP and CBC wnl       Current Outpatient Medications:     ASHWAGANDHA PO, Take 300 mg by mouth daily, Disp: , Rfl:     atorvastatin (LIPITOR) 10 MG tablet, Take 10 mg by mouth At Bedtime, Disp: , Rfl:     Biotin 1000 MCG CHEW, Take 1,000 mcg by mouth daily, Disp: , Rfl:     Cranberry (RA CRANBERRY) 500 MG CAPS, Take 500 mg by mouth daily, Disp: , Rfl:     DULoxetine (CYMBALTA) 30 MG capsule, Take 30 mg by mouth daily, Disp: , Rfl:     DULoxetine (CYMBALTA) 60 MG capsule, Take 60 mg by mouth every evening, Disp: , Rfl:     fluticasone (FLONASE) 50 MCG/ACT nasal spray, Spray 1 spray into both nostrils daily, Disp: , Rfl:     hydrOXYzine (ATARAX) 10 MG tablet, Take 10-20 mg by mouth every 8 hours as needed for anxiety or other (sleep), Disp: , Rfl:     hydrOXYzine HCl (ATARAX) 25 MG tablet, Take 25 mg by mouth every 8 hours as needed for itching, Disp: , Rfl:     ibuprofen (ADVIL/MOTRIN) 600 MG tablet, Take 600 mg by mouth every 4 hours as needed for moderate pain, Disp: , Rfl:      magnesium oxide 400 MG CAPS, Take 1 tablet by mouth 2 times daily, Disp: , Rfl:     Menatetrenone (VITAMIN K2) 100 MCG TABS, Take 100 mcg by mouth daily, Disp: , Rfl:     methocarbamol (ROBAXIN) 750 MG tablet, Take 1 tablet by mouth every 6 hours as needed for muscle spasms, Disp: , Rfl:     Potassium Citrate,Elemental K, 99 MG CAPS, Take 1 capsule by mouth daily, Disp: , Rfl:     thiamine (B-1) 100 MG tablet, Take 1 tablet (100 mg) by mouth daily, Disp: 30 tablet, Rfl: 0    Turmeric 500 MG CAPS, Take 1 capsule by mouth daily, Disp: , Rfl:     vitamin A 3 MG (04062 UNITS) capsule, Take 10,000 Units by mouth daily, Disp: , Rfl:     vitamin D3 (CHOLECALCIFEROL) 125 MCG (5000 UT) tablet, Take 5,000 Units by mouth daily, Disp: , Rfl:   Allergies:  Allergies   Allergen Reactions    Amoxicillin Rash     Per the Antimicrobial Stewardship Team 10/19:  No similarities in side chains for Amoxicillin and Ancef, very low to no risk of cross-reactivity.    Sulfa Antibiotics Hives, Itching and Rash     Medical Hx:  Past Medical History:   Diagnosis Date    Alcoholism (H)     Anxiety     Cancer (H) September 2021    Melanoma    Depressive disorder February 3 2021     Surgical Hx:  Past Surgical History:   Procedure Laterality Date    BIOPSY  September 2021    Melanoma     Family Hx:  Family History   Problem Relation Age of Onset    Substance Abuse Mother     Substance Abuse Father      Social Hx:  Social History     Tobacco Use    Smoking status: Never    Smokeless tobacco: Never   Vaping Use    Vaping status: Never Used   Substance Use Topics    Alcohol use: Not Currently    Drug use: Never        Objective   Physical Exam   BP (!) 154/88   Pulse 64   Wt 63 kg (139 lb)   SpO2 99%   BMI 23.13 kg/m    General: alert, well appearing, no distress  HEENT: clear conjunctiva,   Cardiac: warm and well perfused   Pulm: normal respiratory effort,   MSK: Hand, wrist, elbow, and shoulder joints without evidence of synovitis or effusion.  Intact range of motion.    Skin: no rashes, warm and well-perfused.       Ivis Lozano MD  Rheumatology

## 2024-07-11 ENCOUNTER — MYC MEDICAL ADVICE (OUTPATIENT)
Dept: RHEUMATOLOGY | Facility: CLINIC | Age: 54
End: 2024-07-11

## 2024-07-11 ENCOUNTER — OFFICE VISIT (OUTPATIENT)
Dept: RHEUMATOLOGY | Facility: CLINIC | Age: 54
End: 2024-07-11
Payer: MEDICARE

## 2024-07-11 ENCOUNTER — LAB (OUTPATIENT)
Dept: LAB | Facility: CLINIC | Age: 54
End: 2024-07-11
Payer: MEDICARE

## 2024-07-11 VITALS
DIASTOLIC BLOOD PRESSURE: 88 MMHG | OXYGEN SATURATION: 99 % | SYSTOLIC BLOOD PRESSURE: 154 MMHG | BODY MASS INDEX: 23.13 KG/M2 | HEART RATE: 64 BPM | WEIGHT: 139 LBS

## 2024-07-11 DIAGNOSIS — M79.10 MYALGIA: Primary | ICD-10-CM

## 2024-07-11 DIAGNOSIS — M79.10 MYALGIA: ICD-10-CM

## 2024-07-11 LAB
CK SERPL-CCNC: 204 U/L (ref 26–192)
CRP SERPL-MCNC: <3 MG/L
ERYTHROCYTE [SEDIMENTATION RATE] IN BLOOD BY WESTERGREN METHOD: 8 MM/HR (ref 0–30)

## 2024-07-11 PROCEDURE — 86140 C-REACTIVE PROTEIN: CPT

## 2024-07-11 PROCEDURE — 36415 COLL VENOUS BLD VENIPUNCTURE: CPT

## 2024-07-11 PROCEDURE — 85652 RBC SED RATE AUTOMATED: CPT

## 2024-07-11 PROCEDURE — 99215 OFFICE O/P EST HI 40 MIN: CPT | Performed by: STUDENT IN AN ORGANIZED HEALTH CARE EDUCATION/TRAINING PROGRAM

## 2024-07-11 PROCEDURE — 82550 ASSAY OF CK (CPK): CPT

## 2024-07-21 ENCOUNTER — HOSPITAL ENCOUNTER (EMERGENCY)
Facility: HOSPITAL | Age: 54
Discharge: HOME | End: 2024-07-22
Attending: EMERGENCY MEDICINE | Admitting: EMERGENCY MEDICINE
Payer: MEDICARE

## 2024-07-21 DIAGNOSIS — R45.851 SUICIDAL IDEATION: ICD-10-CM

## 2024-07-21 DIAGNOSIS — F10.920 ALCOHOLIC INTOXICATION WITHOUT COMPLICATION (H): ICD-10-CM

## 2024-07-21 LAB
ALBUMIN SERPL BCG-MCNC: 4.8 G/DL (ref 3.5–5.2)
ALP SERPL-CCNC: 72 U/L (ref 40–150)
ALT SERPL W P-5'-P-CCNC: 24 U/L (ref 0–50)
ANION GAP SERPL CALCULATED.3IONS-SCNC: 13 MMOL/L (ref 7–15)
APAP SERPL-MCNC: <5 UG/ML (ref 10–30)
AST SERPL W P-5'-P-CCNC: 24 U/L (ref 0–45)
BASOPHILS # BLD AUTO: 0 10E3/UL (ref 0–0.2)
BASOPHILS NFR BLD AUTO: 0 %
BILIRUB SERPL-MCNC: 0.2 MG/DL
BUN SERPL-MCNC: 7.9 MG/DL (ref 6–20)
CALCIUM SERPL-MCNC: 8.9 MG/DL (ref 8.8–10.4)
CHLORIDE SERPL-SCNC: 110 MMOL/L (ref 98–107)
CREAT SERPL-MCNC: 0.74 MG/DL (ref 0.51–0.95)
EGFRCR SERPLBLD CKD-EPI 2021: >90 ML/MIN/1.73M2
EOSINOPHIL # BLD AUTO: 0 10E3/UL (ref 0–0.7)
EOSINOPHIL NFR BLD AUTO: 0 %
ERYTHROCYTE [DISTWIDTH] IN BLOOD BY AUTOMATED COUNT: 13.2 % (ref 10–15)
ETHANOL SERPL-MCNC: 0.28 G/DL
GLUCOSE SERPL-MCNC: 106 MG/DL (ref 70–99)
HCO3 SERPL-SCNC: 23 MMOL/L (ref 22–29)
HCT VFR BLD AUTO: 44.1 % (ref 35–47)
HGB BLD-MCNC: 15 G/DL (ref 11.7–15.7)
IMM GRANULOCYTES # BLD: 0 10E3/UL
IMM GRANULOCYTES NFR BLD: 0 %
LYMPHOCYTES # BLD AUTO: 2 10E3/UL (ref 0.8–5.3)
LYMPHOCYTES NFR BLD AUTO: 23 %
MAGNESIUM SERPL-MCNC: 2 MG/DL (ref 1.7–2.3)
MCH RBC QN AUTO: 29.9 PG (ref 26.5–33)
MCHC RBC AUTO-ENTMCNC: 34 G/DL (ref 31.5–36.5)
MCV RBC AUTO: 88 FL (ref 78–100)
MONOCYTES # BLD AUTO: 0.3 10E3/UL (ref 0–1.3)
MONOCYTES NFR BLD AUTO: 3 %
NEUTROPHILS # BLD AUTO: 6.3 10E3/UL (ref 1.6–8.3)
NEUTROPHILS NFR BLD AUTO: 73 %
NRBC # BLD AUTO: 0 10E3/UL
NRBC BLD AUTO-RTO: 0 /100
PLATELET # BLD AUTO: 340 10E3/UL (ref 150–450)
POTASSIUM SERPL-SCNC: 3.4 MMOL/L (ref 3.4–5.3)
PROT SERPL-MCNC: 7.7 G/DL (ref 6.4–8.3)
RBC # BLD AUTO: 5.02 10E6/UL (ref 3.8–5.2)
SALICYLATES SERPL-MCNC: <0.3 MG/DL
SODIUM SERPL-SCNC: 146 MMOL/L (ref 135–145)
WBC # BLD AUTO: 8.6 10E3/UL (ref 4–11)

## 2024-07-21 PROCEDURE — 96374 THER/PROPH/DIAG INJ IV PUSH: CPT

## 2024-07-21 PROCEDURE — 36415 COLL VENOUS BLD VENIPUNCTURE: CPT | Performed by: EMERGENCY MEDICINE

## 2024-07-21 PROCEDURE — 80179 DRUG ASSAY SALICYLATE: CPT | Performed by: EMERGENCY MEDICINE

## 2024-07-21 PROCEDURE — 99291 CRITICAL CARE FIRST HOUR: CPT | Mod: 25

## 2024-07-21 PROCEDURE — 80053 COMPREHEN METABOLIC PANEL: CPT | Performed by: EMERGENCY MEDICINE

## 2024-07-21 PROCEDURE — 85025 COMPLETE CBC W/AUTO DIFF WBC: CPT | Performed by: EMERGENCY MEDICINE

## 2024-07-21 PROCEDURE — 80143 DRUG ASSAY ACETAMINOPHEN: CPT | Performed by: EMERGENCY MEDICINE

## 2024-07-21 PROCEDURE — 82077 ASSAY SPEC XCP UR&BREATH IA: CPT | Performed by: EMERGENCY MEDICINE

## 2024-07-21 PROCEDURE — 250N000011 HC RX IP 250 OP 636: Performed by: EMERGENCY MEDICINE

## 2024-07-21 PROCEDURE — 83735 ASSAY OF MAGNESIUM: CPT | Performed by: EMERGENCY MEDICINE

## 2024-07-21 RX ORDER — LORAZEPAM 2 MG/ML
1 INJECTION INTRAMUSCULAR ONCE
Status: COMPLETED | OUTPATIENT
Start: 2024-07-21 | End: 2024-07-21

## 2024-07-21 RX ADMIN — LORAZEPAM 1 MG: 2 INJECTION, SOLUTION INTRAMUSCULAR; INTRAVENOUS at 21:14

## 2024-07-21 ASSESSMENT — ENCOUNTER SYMPTOMS: AGITATION: 1

## 2024-07-21 ASSESSMENT — COLUMBIA-SUICIDE SEVERITY RATING SCALE - C-SSRS
6. HAVE YOU EVER DONE ANYTHING, STARTED TO DO ANYTHING, OR PREPARED TO DO ANYTHING TO END YOUR LIFE?: NO
IS THE PATIENT NOT ABLE TO COMPLETE C-SSRS: REFUSES TO ANSWER
1. IN THE PAST MONTH, HAVE YOU WISHED YOU WERE DEAD OR WISHED YOU COULD GO TO SLEEP AND NOT WAKE UP?: YES
2. HAVE YOU ACTUALLY HAD ANY THOUGHTS OF KILLING YOURSELF IN THE PAST MONTH?: YES

## 2024-07-21 ASSESSMENT — ACTIVITIES OF DAILY LIVING (ADL)
ADLS_ACUITY_SCORE: 35

## 2024-07-22 VITALS
SYSTOLIC BLOOD PRESSURE: 149 MMHG | DIASTOLIC BLOOD PRESSURE: 73 MMHG | OXYGEN SATURATION: 96 % | RESPIRATION RATE: 14 BRPM | HEART RATE: 99 BPM

## 2024-07-22 PROBLEM — F10.20 ALCOHOL USE DISORDER, MODERATE, DEPENDENCE (H): Status: ACTIVE | Noted: 2023-08-13

## 2024-07-22 PROBLEM — F33.1 MAJOR DEPRESSIVE DISORDER, RECURRENT, MODERATE (H): Status: ACTIVE | Noted: 2021-02-04

## 2024-07-22 PROBLEM — F41.9 ANXIETY: Status: ACTIVE | Noted: 2021-02-04

## 2024-07-22 LAB
AMPHETAMINES UR QL SCN: NORMAL
BARBITURATES UR QL SCN: NORMAL
BENZODIAZ UR QL SCN: NORMAL
BZE UR QL SCN: NORMAL
CANNABINOIDS UR QL SCN: NORMAL
FENTANYL UR QL: NORMAL
HOLD SPECIMEN: NORMAL
OPIATES UR QL SCN: NORMAL
PCP QUAL URINE (ROCHE): NORMAL

## 2024-07-22 PROCEDURE — 80307 DRUG TEST PRSMV CHEM ANLYZR: CPT | Performed by: EMERGENCY MEDICINE

## 2024-07-22 PROCEDURE — 250N000013 HC RX MED GY IP 250 OP 250 PS 637: Performed by: EMERGENCY MEDICINE

## 2024-07-22 RX ORDER — CHLORDIAZEPOXIDE HYDROCHLORIDE 25 MG/1
25 CAPSULE, GELATIN COATED ORAL 3 TIMES DAILY PRN
Qty: 15 CAPSULE | Refills: 0 | Status: ON HOLD | OUTPATIENT
Start: 2024-07-22 | End: 2024-07-27

## 2024-07-22 RX ORDER — ACETAMINOPHEN 325 MG/1
650 TABLET ORAL ONCE
Status: COMPLETED | OUTPATIENT
Start: 2024-07-22 | End: 2024-07-22

## 2024-07-22 RX ORDER — LORAZEPAM 0.5 MG/1
1 TABLET ORAL EVERY 8 HOURS PRN
Status: DISCONTINUED | OUTPATIENT
Start: 2024-07-22 | End: 2024-07-22 | Stop reason: HOSPADM

## 2024-07-22 RX ADMIN — ACETAMINOPHEN 650 MG: 325 TABLET ORAL at 10:17

## 2024-07-22 ASSESSMENT — ACTIVITIES OF DAILY LIVING (ADL)
ADLS_ACUITY_SCORE: 35

## 2024-07-22 NOTE — ED PROVIDER NOTES
[facility] EMERGENCY DEPARTMENT   ED Mental Health Observation - Daily Note for 7/22/2024    Holly Briseno is a 54 year old female currently boarding in the ED while awaiting placement for Mental health crisis.  Please see the initial H&P for this patient's presentation, workup, and disposition plan.     Hold Status:  Patient is Voluntary    Plan:  In brief, the patient's presentation is notable for alcohol intoxication, agitation, and suicidal ideation.   Patient is awaiting Mental health evaluation    Interim History:  There were significant events since last note.    Physical Exam:  /78   Pulse 104   Resp 14   SpO2 97%   No respiratory distress, on room air   Well perfused  Behavior appropriate    Medications provided prior to my care:  Medications   LORazepam (ATIVAN) tablet 1 mg (has no administration in time range)   LORazepam (ATIVAN) injection 1 mg (1 mg Intravenous $Given 7/21/24 2114)       Laboratory (reviewed and interpreted):  Labs Ordered and Resulted from Time of ED Arrival to Time of ED Departure   COMPREHENSIVE METABOLIC PANEL - Abnormal       Result Value    Sodium 146 (*)     Potassium 3.4      Carbon Dioxide (CO2) 23      Anion Gap 13      Urea Nitrogen 7.9      Creatinine 0.74      GFR Estimate >90      Calcium 8.9      Chloride 110 (*)     Glucose 106 (*)     Alkaline Phosphatase 72      AST 24      ALT 24      Protein Total 7.7      Albumin 4.8      Bilirubin Total 0.2     ETHYL ALCOHOL LEVEL - Abnormal    Alcohol ethyl 0.28 (*)    ACETAMINOPHEN LEVEL - Abnormal    Acetaminophen <5.0 (*)    MAGNESIUM - Normal    Magnesium 2.0     SALICYLATE LEVEL - Normal    Salicylate <0.3     URINE DRUG SCREEN PANEL - Normal    Amphetamines Urine Screen Negative      Barbituates Urine Screen Negative      Benzodiazepine Urine Screen Negative      Cannabinoids Urine Screen Negative      Cocaine Urine Screen Negative      Fentanyl Qual Urine Screen Negative      Opiates  Urine Screen Negative      PCP Urine Screen Negative     CBC WITH PLATELETS AND DIFFERENTIAL    WBC Count 8.6      RBC Count 5.02      Hemoglobin 15.0      Hematocrit 44.1      MCV 88      MCH 29.9      MCHC 34.0      RDW 13.2      Platelet Count 340      % Neutrophils 73      % Lymphocytes 23      % Monocytes 3      % Eosinophils 0      % Basophils 0      % Immature Granulocytes 0      NRBCs per 100 WBC 0      Absolute Neutrophils 6.3      Absolute Lymphocytes 2.0      Absolute Monocytes 0.3      Absolute Eosinophils 0.0      Absolute Basophils 0.0      Absolute Immature Granulocytes 0.0      Absolute NRBCs 0.0         ED Course:  7:36 AM I met with the patient and discussed plan with care team.   7:18 AM I spoke with the DEC , Johanne, she recommends discharge.   7:42 AM Repeat exam benign, discussed discharge and follow up.  Patient not grossly intoxicated.  Denies any suicidal or homicidal ideation.  10:11 AM was called to the room by nursing, patient having request for something for her chronic pain for possible alcohol withdrawal.  She certainly does not appear to be in florid withdrawal here but did note history.  Patient was given dose of Tylenol for her chronic pain I will discharge on a short Librium taper.        Patient was signed out to me at the start of shift.  Pending at time of signout was evaluation recommendations per the DEC .  I did review screening labs, tox screen.  Talk screen was negative, alcohol level from last night was 0.28.  On my evaluation patient is not at all grossly intoxicated, she is resting comfortably, tolerating p.o. food and fluids.  Patient was seen and evaluated by the DEC  and at this time recommendations are for discharge and close follow-up.  Safety plan has been put in place, patient is denying any homicidal or suicidal ideation, patient is not holdable, patient per the DEC  has medications at home and has a provider at home that has  treated her anxiety and depression in the past, has a sponsor and does attend AA.  Family is supportive and all alcohol has been removed from the patient's home.  Again at this time patient denies any suicidal or homicidal ideation, has been pleasant and cooperative and is not intoxicated.  Therefore, I agree with the DEC , patient can be safely discharged with recommendations for very close follow-up.  We are helping the patient arrange for transportation back home.  Discussed all these recommendations and findings with the patient felt reassured and comfortable with discharge.  Return precautions provided.    Impression/Plan:  1. Alcoholic intoxication without complication (H24)    2. Suicidal ideation        MD Estrella Santos Derrick, MD  07/22/24 0750       Ellis De La Rosa MD  07/22/24 1016

## 2024-07-22 NOTE — CONSULTS
Diagnostic Evaluation Consultation  Crisis Assessment    Patient Name: Holly Briseno  Age:  54 year old  Legal Sex: female  Gender Identity: female  Pronouns: she/her/hers  Race: White  Ethnicity: Not  or   Language: English    Patient was assessed: Virtual: [employer]   Crisis Assessment Start Date: 07/22/24  Crisis Assessment Start Time: 0610  Crisis Assessment Stop Time: 0649  Patient location: [facility] EMERGENCY DEPARTMENT                           Referral Data and Chief Complaint  Holly Briseno presents to the ED via police, via EMS. Patient is presenting to the ED for the following concerns: Suicidal ideation, Intoxication.   Factors that make the mental health crisis life threatening or complex are:  Pt states she was sober for 5 months, has been dealing with a lot of chronic pain. Pt decided to get alcohol and drank because she is tired of hurting all the time, doctors not able to help her with the pain. Pt reports drinking a lot, she didn't know how much total in quantity, however started drinking on Saturday night. Pt is unsure whether she blacked out. Pt states she was by herself and has no clue. Pt's son and daughter came to her house. Pt had texted a friend that she was drinking, which prompted this friend to notify the pt's children. Pt has providers through [facility] in [place]. Pt has a referral for the [facility], her second referral because the first one was denied. Pt reports she has been dealing with this pain for over 2 decades. Pt denied any homicidal thoughts. Pt states she is done however she has no plans or intent to end her life. Pt declined any additional services offered stating she is about to reading and watch all that she needs to on her own. Pt does not feel she needs additional services currently, declines anything offered during assessment. Pt denied any current or active intent, thoughts or plans to harm herself. Pt denied any HI or  NSSI. Pt states she has a continued desire for sobriety. She feels she is safe to d/c home.    Informed Consent and Assessment Methods  Explained the crisis assessment process, including applicable information disclosures and limits to confidentiality, assessed understanding of the process, and obtained consent to proceed with the assessment.  Assessment methods included conducting a formal interview with patient, review of medical records, collaboration with medical staff, and obtaining relevant collateral information from family and community providers when available.  : done     Patient response to interventions: acceptance expressed, verbalizes understanding  Coping skills were attempted to reduce the crisis:  Reading, watching vidoes, exercise, talking to friends.     History of the Crisis   Pt has hx of depression and anxiety, alcoholism. Pt was first diagnosed with depression, anxiety, and alcoholism in 2021. Pt reports previously attending therapy months ago and has no plans to attend again. Pt denied any previous IP MH placements, day treatment, or PHP. Previous DEC indicate IP MH placement December 2022 for when pt attempted to complete suicide. Pt attended 2 previous SUE programs and just got home in April from her most recent placement at [facility]. Pt was recommended for OP treatment to follow however insurance wouldn't cover it, then would cover but was told it would have been a high out of pocket cost and then discovered she had a second insurance to help pay etc. and at some point, it would have been so long since she had been in treatment she decided not to go. Pt states she has been doing her own reading, personal growth work, attending AA meetings and sponsorships. Pt feels she needs to get help managing her physical pain, so she isn't in pain 24/7 and doesn't have a headache all day long. Pt feels this is her primary concern. Pt feels she is plenty connected with services currently and declined  any additional services offered during the assessment.    Brief Psychosocial History  Family:  , Children yes (2 children (26, almost 21))  Support System:  Sibling(s), Other (specify) (Aunts and friends are supportive.)  Employment Status:  unemployed, disabled  Source of Income:  social security  Financial Environmental Concerns:  unemployed  Current Hobbies:  social media/computer activities, reading, television/movies/videos, other (see comments), exercise/fitness, home improvement, music, outdoor activities, interaction with pets, cooking/baking, travel (Volunteers ar a school, studies on alcohol abuse and building self esteem.)  Barriers in Personal Life:  lack of time, financial concerns    Significant Clinical History  Current Anxiety Symptoms:   (None endorsed)  Current Depression/Trauma:  sense of doom, apathy, thoughts of death/suicide  Current Somatic Symptoms:   (None endorsed)  Current Psychosis/Thought Disturbance:  impulsive, agitation  Current Eating Symptoms:   (No changes)  Chemical Use History:  Alcohol: Binge  Last Use:: 07/21/24  Benzodiazepines: None  Opiates: None  Cocaine: None  Marijuana: None  Other Use: None  Withdrawal Symptoms: Nausea (Sweats, shakes, sometimes vomiting.)  Addictions:  (None endorsed)   Past diagnosis:  Anxiety Disorder, Depression, Substance Use Disorder  Family history:  Substance Use Disorder (Both sides of the fmaily have SUE.)  Past treatment:  Individual therapy, Primary Care  Details of most recent treatment:  Pt reports she did SUE treatment for alcohol at [facility] in February of 2021 and did not find that helpful. Hx of ED visit for mental health August 2022, hospitalization for suicide attempt December 2022. Pt reports previously attending therapy months ago and has no plans to attend again. Pt denied any previous day treatment, or PHP. Pt attended 2 SUE programming and just got home in April from her most recent placement at [facility]. Pt was  recommended for OP treatment eventually for a variety of reasons she decided not to go. Pt states she has been doing her own reading, personal growth work, AA meetings and sponsorships. Pt feels she needs to get help managing her physical pain so she isn't in pain 24/7 and doesn't have a headache all day long.  Other relevant history:  Pt denied any legal issues.    Collateral Information  Is there collateral information: Yes     Collateral information name, relationship, phone number:  [phone] (Mehnaz) daughter    What happened today: Mom had threatened to take her own life, siblings stayed with mom. Sat with mom, she didn't want them there, they talked and had a good conversation. Both kids left and when Mehnaz came back pt had gotten more alcohol and said she didn't want to be around anymore. Called police and she told police she planned to drink herself to death, no one could stop her. 2 years ago, when Mehnaz was at work pt had drank and overdosed on pills she threw up. 5 months ago, drinking again was on a 4-5 day drinking hernandez where she said she took her life.     What is different about patient's functioning: Celebrated 5 motnhs sobriety, talked with a friend who was messing with her head who she wwent to treatment with and has terrible pain she has had her whole life.     Has patient made comments about wanting to kill themselves/others: yes    If d/c is recommended, can they take part in safety/aftercare planning:yes    Additional collateral information:  Has medications and takes them. On the days she is not drinking she is better.     Risk Assessment  [place] Suicide Severity Rating Scale Full Clinical Version:  Suicidal Ideation  Q1 Wish to be Dead (Lifetime): No  Q2 Non-Specific Active Suicidal Thoughts (Lifetime): No  Q6 Suicide Behavior (Lifetime): no     Suicidal Behavior (Lifetime)  Actual Attempt (Lifetime): Yes  Total Number of Actual Attempts (Lifetime): 1  Has subject engaged in  non-suicidal self-injurious behavior? (Lifetime): No  Interrupted Attempts (Lifetime): No  Aborted or Self-Interrupted Attempt (Lifetime): No  Preparatory Acts or Behavior (Lifetime): No    [place] Suicide Severity Rating Scale Recent:   Suicidal Ideation (Recent)  Q1 Wished to be Dead (Past Month): yes  Q2 Suicidal Thoughts (Past Month): yes  Q3 Suicidal Thought Method: no  Q4 Suicidal Intent without Specific Plan: no  Q5 Suicide Intent with Specific Plan: no  Level of Risk per Screen: low risk  Intensity of Ideation (Recent)  Most Severe Ideation Rating (Past 1 Month): 2  Description of Most Severe Ideation (Past 1 Month): 2 in past month and 2 currently.  Frequency (Past 1 Month): Daily or almost daily  Duration (Past 1 Month): Fleeting, few seconds or minutes  Controllability (Past 1 Month): Easily able to control thoughts  Deterrents (Past 1 Month): Deterrents definitely stopped you from attempting suicide  Reasons for Ideation (Past 1 Month): Completely to end or stop the pain (You couldn't go on living with the pain or how you were feeling)  Suicidal Behavior (Recent)  Actual Attempt (Past 3 Months): No  Total Number of Actual Attempts (Past 3 Months): 0  Has subject engaged in non-suicidal self-injurious behavior? (Past 3 Months): No  Interrupted Attempts (Past 3 Months): No  Total Number of Interrupted Attempts (Past 3 Months): 0  Aborted or Self-Interrupted Attempt (Past 3 Months): No  Total Number of Aborted or Self-Interrupted Attempts (Past 3 Months): 0  Preparatory Acts or Behavior (Past 3 Months): No  Total Number of Preparatory Acts (Past 3 Months): 0    Environmental or Psychosocial Events: worsening chronic illness, ongoing abuse of substances, excessive debt, poor finances  Protective Factors: Protective Factors: strong bond to family unit, community support, or employment, lives in a responsibly safe and stable environment, other (see comment), cultural, spiritual , or Adventist beliefs  associated with meaning and value in life, constructive use of leisure time, enjoyable activities, resilience, optimistic outlook - identification of future goals, good problem-solving, coping, and conflict resolution skills, good impulse control, sense of importance of health and wellness, able to access care without barriers, supportive ongoing medical and mental health care relationships, sense of belonging, sense of self-efficacy and/or positive self-esteem (Pt has a dog she cares for.)    Does the patient have thoughts of harming others? Feels Like Hurting Others: no  Previous Attempt to Hurt Others: no  Current presentation: Irritable  Violence Threats in Past 6 Months: None endorsed  Current Violence Plan or Thoughts: Pt denied  Is the patient engaging in sexually inappropriate behavior?: no  Duty to warn initiated: no  Duty to warn details: None    Is the patient engaging in sexually inappropriate behavior?  no        Mental Status Exam   Affect: Flat  Appearance: Appropriate  Attention Span/Concentration: Attentive  Eye Contact: Engaged    Fund of Knowledge: Appropriate   Language /Speech Content: Fluent  Language /Speech Volume: Normal  Language /Speech Rate/Productions: Normal  Recent Memory: Intact  Remote Memory: Intact  Mood: Irritable, Sad, Depressed  Orientation to Person: Yes   Orientation to Place: Yes  Orientation to Time of Day: Yes  Orientation to Date: Yes     Situation (Do they understand why they are here?): Yes  Psychomotor Behavior: Normal  Thought Content: Clear  Thought Form: Intact      Medication  Psychotropic medications:   Medication Orders - Psychiatric (From admission, onward)      Start     Dose/Rate Route Frequency Ordered Stop    07/22/24 0702  LORazepam (ATIVAN) tablet 1 mg         1 mg Oral EVERY 8 HOURS PRN 07/22/24 0702      07/22/24 0000  chlordiazePOXIDE (LIBRIUM) 25 MG capsule        Note to Pharmacy: 50mg of chlordiazepoxide every 8 hours for two days, then decrease to  25mg every 8 hours for another two days followed by 25mg PRN as needed.    25 mg Oral 3 TIMES DAILY PRN 07/22/24 1014          Current Care Team  Patient Care Team:  Kat Mera, Lizzeth Colbert MD as PCP - General (Family Medicine)  David Morrow as Therapist  Ivis Lozano MD as Assigned Rheumatology Provider    Diagnosis  Patient Active Problem List   Diagnosis Code    Alcohol use with withdrawal (H) F10.939    Anxiety F41.9    Chronic migraine with aura G43.E09    Intermittent palpitations R00.2    Major depressive disorder, recurrent, moderate (H) F33.1    Suicidal ideation R45.851    Alcohol use disorder, moderate, dependence (H) F10.20    Alcohol intoxication, uncomplicated (H24) F10.920    Arthralgia, unspecified joint M25.50    Alcohol use disorder F10.90    Alcohol withdrawal, uncomplicated (H) F10.930     Primary Problem This Admission  Active Hospital Problems  F10.20  Alcohol use disorder, moderate, dependence (H)  F33.1  Major depressive disorder, recurrent, moderate (H)    Clinical Summary and Substantiation of Recommendations   After therapeutic assessment, intervention, and aftercare planning by ED care team and LM and in consultation with attending provider, the patient's circumstances and mental state were appropriate for outpatient management. It is the recommendation of this clinician that pt discharge with OP  support. Currently the pt is not presenting as an acute risk to self or others due to the following factors: Pt reports she has been dealing with pain for over 2 decades. Pt denied any homicidal thoughts. Pt states she is done however she has no plans or intent to end her life, doesn't want to feel pain anymore. Pt declined any additional services offered stating she is all about the reading and watches all that she needs to on her own. Pt does not feel she needs additional services currently, declines anything offered during assessment. Pt denied any current or  active intent, thoughts or plans to harm herself. Pt denied any HI or NSSI. Pt states she has a continued desire for sobriety. She feels she is safe to d/c home.    Patient coping skills attempted to reduce the crisis:  Reading, watching vidoes, exercise, talking to friends.    Disposition  Recommended disposition: Medication Management        Reviewed case and recommendations with attending provider. Attending Name: Dr De La Rosa       Attending concurs with disposition: yes       Patient and/or validated legal guardian concurs with disposition: yes       Final disposition:  discharge    Legal status on admission: Voluntary/Patient has signed consent for treatment    Assessment Details   Total duration spent with the patient: 38 min     CPT code(s) utilized: 20304 - Psychotherapy for Crisis - 60 (30-74*) min    Kiara Duke [facility], Psychotherapist  DEC - Triage & Transition Services  Callback: [phone]

## 2024-07-22 NOTE — ED NOTES
Bed: JNED-08  Expected date: 7/21/24  Expected time: 8:38 PM  Means of arrival: Ambulance  Comments:  [facility]  55 yo F Crisis eval/ETOH

## 2024-07-22 NOTE — ED PROVIDER NOTES
ED Behavioral Health Patient Transition of Care Note    Assuming care from:  Peter Tomlin MD    Brief history:  54 year old female presents to the Emergency Department for evaluation of acute alcohol intoxication and agitation and suicidal ideation.     Follow up or pending management: Our plan of care at this time is for administration of lorazepam as an anxiolytic for the patient to see if this helps improve her symptomatology. Dr. Tomlin recommended to the patient that we perform screening laboratory testing and get a mental health assessment. Will continue to reassess frequently for restraint removal.    Has SW seen the patient:  no    Is the patient on a hold:   No, but is holdable    Current plan for disposition:  Awaiting SW evaluation    Safety concerns:  Physically restrained after being combative towards EMS and staff      Significant events during shift:  N/A      1. Alcoholic intoxication without complication (H24)    2. Suicidal ideation       I, Shaylee Pruitt, am serving as a scribe to document services personally performed by Kelly Avendano MD, based on my observations and the provider's statements to me.  I, Kelly Avendano MD, attest that Shaylee Pruitt is acting in a scribe capacity, has observed my performance of the services and has documented them in accordance with my direction.
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
## 2024-07-22 NOTE — ED PROVIDER NOTES
[facility] EMERGENCY DEPARTMENT   ED Mental Health Observation - Discharge Note (Brief)    Holly Briseno is boarding in the ED after undergoing evaluation for Mental health crisis  Please see the daily progress note for this patient's presentation, physical examination, and work up.    Upon reevaluation and discussion with DEC , we believe patient has shown sufficient improvement and thus will be Discharged.    EMERGENCY DEPARTMENT OBSERVATION status ended at 7:47 AM 7/22/2024    Discharge Management Time: > or equal to 30 minutes    1. Alcoholic intoxication without complication (H24)    2. Suicidal ideation        MD Estrella HARTMANN Derrick, MD  07/22/24 0758
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
## 2024-07-22 NOTE — ED PROVIDER NOTES
[facility] EMERGENCY DEPARTMENT   ED Mental Health Observation - Initiation Note    Holly Briseno was placed into observation at 11:26 PM on 7/21/2024 for Mental health crisis.   Patient is expected to be under observation status for a minimum of eight hours.    MD Sada Cedeño Scott E., MD  07/21/24 1423

## 2024-07-22 NOTE — ED NOTES
Pt sleeping. Equal chest rise and fall. Discontinued restraints per protocol. Will continue to monitor according to protocol. Staff and charge RN notified.

## 2024-07-25 ENCOUNTER — TELEPHONE (OUTPATIENT)
Dept: BEHAVIORAL HEALTH | Facility: CLINIC | Age: 54
End: 2024-07-25

## 2024-07-25 ENCOUNTER — HOSPITAL ENCOUNTER (EMERGENCY)
Facility: CLINIC | Age: 54
Discharge: OTHER INSTITUTION | DRG: 897 | End: 2024-07-26
Attending: EMERGENCY MEDICINE | Admitting: EMERGENCY MEDICINE
Payer: MEDICARE

## 2024-07-25 DIAGNOSIS — G89.4 CHRONIC PAIN DISORDER: ICD-10-CM

## 2024-07-25 DIAGNOSIS — F10.930 ALCOHOL WITHDRAWAL SYNDROME WITHOUT COMPLICATION (H): ICD-10-CM

## 2024-07-25 DIAGNOSIS — F10.920 ALCOHOLIC INTOXICATION WITHOUT COMPLICATION (H): ICD-10-CM

## 2024-07-25 LAB
ALBUMIN SERPL BCG-MCNC: 4.4 G/DL (ref 3.5–5.2)
ALBUMIN UR-MCNC: NEGATIVE MG/DL
ALP SERPL-CCNC: 69 U/L (ref 40–150)
ALT SERPL W P-5'-P-CCNC: 19 U/L (ref 0–50)
AMPHETAMINES UR QL SCN: ABNORMAL
ANION GAP SERPL CALCULATED.3IONS-SCNC: 10 MMOL/L (ref 7–15)
APPEARANCE UR: CLEAR
AST SERPL W P-5'-P-CCNC: 24 U/L (ref 0–45)
BACTERIA #/AREA URNS HPF: ABNORMAL /HPF
BARBITURATES UR QL SCN: ABNORMAL
BASOPHILS # BLD AUTO: 0 10E3/UL (ref 0–0.2)
BASOPHILS NFR BLD AUTO: 1 %
BENZODIAZ UR QL SCN: ABNORMAL
BILIRUB SERPL-MCNC: 0.2 MG/DL
BILIRUB UR QL STRIP: NEGATIVE
BUN SERPL-MCNC: 9.9 MG/DL (ref 6–20)
BZE UR QL SCN: ABNORMAL
CALCIUM SERPL-MCNC: 8.7 MG/DL (ref 8.8–10.4)
CANNABINOIDS UR QL SCN: ABNORMAL
CHLORIDE SERPL-SCNC: 108 MMOL/L (ref 98–107)
COLOR UR AUTO: ABNORMAL
CREAT SERPL-MCNC: 0.89 MG/DL (ref 0.51–0.95)
EGFRCR SERPLBLD CKD-EPI 2021: 78 ML/MIN/1.73M2
EOSINOPHIL # BLD AUTO: 0.1 10E3/UL (ref 0–0.7)
EOSINOPHIL NFR BLD AUTO: 4 %
ERYTHROCYTE [DISTWIDTH] IN BLOOD BY AUTOMATED COUNT: 12.6 % (ref 10–15)
ETHANOL SERPL-MCNC: 0.23 G/DL
FENTANYL UR QL: ABNORMAL
GLUCOSE SERPL-MCNC: 88 MG/DL (ref 70–99)
GLUCOSE UR STRIP-MCNC: NEGATIVE MG/DL
HCO3 SERPL-SCNC: 28 MMOL/L (ref 22–29)
HCT VFR BLD AUTO: 40.7 % (ref 35–47)
HGB BLD-MCNC: 13.7 G/DL (ref 11.7–15.7)
HGB UR QL STRIP: NEGATIVE
IMM GRANULOCYTES # BLD: 0 10E3/UL
IMM GRANULOCYTES NFR BLD: 0 %
KETONES UR STRIP-MCNC: NEGATIVE MG/DL
LEUKOCYTE ESTERASE UR QL STRIP: ABNORMAL
LIPASE SERPL-CCNC: 65 U/L (ref 13–60)
LYMPHOCYTES # BLD AUTO: 2.1 10E3/UL (ref 0.8–5.3)
LYMPHOCYTES NFR BLD AUTO: 54 %
MAGNESIUM SERPL-MCNC: 2.1 MG/DL (ref 1.7–2.3)
MCH RBC QN AUTO: 30.1 PG (ref 26.5–33)
MCHC RBC AUTO-ENTMCNC: 33.7 G/DL (ref 31.5–36.5)
MCV RBC AUTO: 90 FL (ref 78–100)
MONOCYTES # BLD AUTO: 0.3 10E3/UL (ref 0–1.3)
MONOCYTES NFR BLD AUTO: 6 %
NEUTROPHILS # BLD AUTO: 1.4 10E3/UL (ref 1.6–8.3)
NEUTROPHILS NFR BLD AUTO: 35 %
NITRATE UR QL: NEGATIVE
NRBC # BLD AUTO: 0 10E3/UL
NRBC BLD AUTO-RTO: 0 /100
OPIATES UR QL SCN: ABNORMAL
PCP QUAL URINE (ROCHE): ABNORMAL
PH UR STRIP: 7 [PH] (ref 5–7)
PLATELET # BLD AUTO: 275 10E3/UL (ref 150–450)
POTASSIUM SERPL-SCNC: 4.3 MMOL/L (ref 3.4–5.3)
PROT SERPL-MCNC: 7.2 G/DL (ref 6.4–8.3)
RBC # BLD AUTO: 4.55 10E6/UL (ref 3.8–5.2)
RBC URINE: <1 /HPF
SODIUM SERPL-SCNC: 146 MMOL/L (ref 135–145)
SP GR UR STRIP: 1 (ref 1–1.03)
SQUAMOUS EPITHELIAL: <1 /HPF
UROBILINOGEN UR STRIP-MCNC: NORMAL MG/DL
WBC # BLD AUTO: 3.9 10E3/UL (ref 4–11)
WBC URINE: 1 /HPF

## 2024-07-25 PROCEDURE — 250N000013 HC RX MED GY IP 250 OP 250 PS 637: Performed by: EMERGENCY MEDICINE

## 2024-07-25 PROCEDURE — 258N000003 HC RX IP 258 OP 636: Performed by: EMERGENCY MEDICINE

## 2024-07-25 PROCEDURE — 83735 ASSAY OF MAGNESIUM: CPT | Performed by: EMERGENCY MEDICINE

## 2024-07-25 PROCEDURE — 36415 COLL VENOUS BLD VENIPUNCTURE: CPT | Performed by: EMERGENCY MEDICINE

## 2024-07-25 PROCEDURE — 80307 DRUG TEST PRSMV CHEM ANLYZR: CPT | Performed by: EMERGENCY MEDICINE

## 2024-07-25 PROCEDURE — 82040 ASSAY OF SERUM ALBUMIN: CPT | Performed by: EMERGENCY MEDICINE

## 2024-07-25 PROCEDURE — 82077 ASSAY SPEC XCP UR&BREATH IA: CPT | Performed by: EMERGENCY MEDICINE

## 2024-07-25 PROCEDURE — 250N000011 HC RX IP 250 OP 636: Performed by: EMERGENCY MEDICINE

## 2024-07-25 PROCEDURE — 85004 AUTOMATED DIFF WBC COUNT: CPT | Performed by: EMERGENCY MEDICINE

## 2024-07-25 PROCEDURE — 83690 ASSAY OF LIPASE: CPT | Performed by: EMERGENCY MEDICINE

## 2024-07-25 PROCEDURE — 96365 THER/PROPH/DIAG IV INF INIT: CPT | Performed by: EMERGENCY MEDICINE

## 2024-07-25 PROCEDURE — 250N000009 HC RX 250: Performed by: EMERGENCY MEDICINE

## 2024-07-25 PROCEDURE — 99285 EMERGENCY DEPT VISIT HI MDM: CPT | Mod: 25 | Performed by: EMERGENCY MEDICINE

## 2024-07-25 PROCEDURE — 99284 EMERGENCY DEPT VISIT MOD MDM: CPT | Performed by: EMERGENCY MEDICINE

## 2024-07-25 PROCEDURE — 81001 URINALYSIS AUTO W/SCOPE: CPT | Performed by: EMERGENCY MEDICINE

## 2024-07-25 RX ORDER — DULOXETIN HYDROCHLORIDE 30 MG/1
60 CAPSULE, DELAYED RELEASE ORAL DAILY
Status: DISCONTINUED | OUTPATIENT
Start: 2024-07-25 | End: 2024-07-25 | Stop reason: DRUGHIGH

## 2024-07-25 RX ORDER — METHOCARBAMOL 500 MG/1
2 TABLET, FILM COATED ORAL EVERY 8 HOURS PRN
COMMUNITY
Start: 2024-06-25

## 2024-07-25 RX ORDER — HYDROXYZINE PAMOATE 25 MG/1
25 CAPSULE ORAL AT BEDTIME
COMMUNITY
Start: 2024-06-25 | End: 2024-07-25

## 2024-07-25 RX ORDER — ACETAMINOPHEN 500 MG
1000 TABLET ORAL ONCE
Status: COMPLETED | OUTPATIENT
Start: 2024-07-25 | End: 2024-07-25

## 2024-07-25 RX ORDER — DIAZEPAM 5 MG
10 TABLET ORAL ONCE
Status: COMPLETED | OUTPATIENT
Start: 2024-07-25 | End: 2024-07-25

## 2024-07-25 RX ORDER — LORAZEPAM 0.5 MG/1
0.5 TABLET ORAL ONCE
Status: COMPLETED | OUTPATIENT
Start: 2024-07-25 | End: 2024-07-25

## 2024-07-25 RX ORDER — DULOXETIN HYDROCHLORIDE 30 MG/1
60 CAPSULE, DELAYED RELEASE ORAL 2 TIMES DAILY
Status: DISCONTINUED | OUTPATIENT
Start: 2024-07-25 | End: 2024-07-26 | Stop reason: HOSPADM

## 2024-07-25 RX ORDER — ACAMPROSATE CALCIUM 333 MG/1
333 TABLET, DELAYED RELEASE ORAL 3 TIMES DAILY
COMMUNITY

## 2024-07-25 RX ADMIN — DIAZEPAM 10 MG: 5 TABLET ORAL at 21:05

## 2024-07-25 RX ADMIN — DULOXETINE HYDROCHLORIDE 60 MG: 30 CAPSULE, DELAYED RELEASE ORAL at 19:49

## 2024-07-25 RX ADMIN — LORAZEPAM 0.5 MG: 0.5 TABLET ORAL at 17:41

## 2024-07-25 RX ADMIN — THIAMINE HYDROCHLORIDE: 100 INJECTION, SOLUTION INTRAMUSCULAR; INTRAVENOUS at 12:05

## 2024-07-25 RX ADMIN — ACETAMINOPHEN 1000 MG: 500 TABLET, FILM COATED ORAL at 21:05

## 2024-07-25 ASSESSMENT — COLUMBIA-SUICIDE SEVERITY RATING SCALE - C-SSRS
2. HAVE YOU ACTUALLY HAD ANY THOUGHTS OF KILLING YOURSELF IN THE PAST MONTH?: YES
4. HAVE YOU HAD THESE THOUGHTS AND HAD SOME INTENTION OF ACTING ON THEM?: YES
1. IN THE PAST MONTH, HAVE YOU WISHED YOU WERE DEAD OR WISHED YOU COULD GO TO SLEEP AND NOT WAKE UP?: YES
5. HAVE YOU STARTED TO WORK OUT OR WORKED OUT THE DETAILS OF HOW TO KILL YOURSELF? DO YOU INTEND TO CARRY OUT THIS PLAN?: NO
3. HAVE YOU BEEN THINKING ABOUT HOW YOU MIGHT KILL YOURSELF?: NO
6. HAVE YOU EVER DONE ANYTHING, STARTED TO DO ANYTHING, OR PREPARED TO DO ANYTHING TO END YOUR LIFE?: YES

## 2024-07-25 ASSESSMENT — LIFESTYLE VARIABLES
ANXIETY: NO ANXIETY, AT EASE
VISUAL DISTURBANCES: NOT PRESENT
NAUSEA AND VOMITING: NO NAUSEA AND NO VOMITING
TREMOR: 2
HEADACHE, FULLNESS IN HEAD: NOT PRESENT
ORIENTATION AND CLOUDING OF SENSORIUM: ORIENTED AND CAN DO SERIAL ADDITIONS
NAUSEA AND VOMITING: NO NAUSEA AND NO VOMITING
TOTAL SCORE: 7
AUDITORY DISTURBANCES: NOT PRESENT
VISUAL DISTURBANCES: NOT PRESENT
TOTAL SCORE: 2
TOTAL SCORE: 2
AGITATION: NORMAL ACTIVITY
TREMOR: 2
HEADACHE, FULLNESS IN HEAD: MODERATE
HEADACHE, FULLNESS IN HEAD: NOT PRESENT
AUDITORY DISTURBANCES: NOT PRESENT
NAUSEA AND VOMITING: NO NAUSEA AND NO VOMITING
ANXIETY: NO ANXIETY, AT EASE
ANXIETY: NO ANXIETY, AT EASE
PAROXYSMAL SWEATS: 2
AGITATION: NORMAL ACTIVITY
ORIENTATION AND CLOUDING OF SENSORIUM: ORIENTED AND CAN DO SERIAL ADDITIONS
AGITATION: NORMAL ACTIVITY
AUDITORY DISTURBANCES: NOT PRESENT
PAROXYSMAL SWEATS: NO SWEAT VISIBLE
ORIENTATION AND CLOUDING OF SENSORIUM: ORIENTED AND CAN DO SERIAL ADDITIONS
VISUAL DISTURBANCES: NOT PRESENT
TREMOR: 2
PAROXYSMAL SWEATS: NO SWEAT VISIBLE

## 2024-07-25 ASSESSMENT — ACTIVITIES OF DAILY LIVING (ADL)
ADLS_ACUITY_SCORE: 36
ADLS_ACUITY_SCORE: 35
ADLS_ACUITY_SCORE: 36
ADLS_ACUITY_SCORE: 35
ADLS_ACUITY_SCORE: 36
ADLS_ACUITY_SCORE: 35

## 2024-07-25 NOTE — TELEPHONE ENCOUNTER
S: [facility] ED , Provider Gideon calling at 6:33 PM with clinical on a 54 year old/Female presenting for alcohol detox.     B: Pt presents for ETOH detox.   Currently reports drinking binges, drank a 175mL on Sat 7/20, and then again yesterday 7/24 night into this morning around 15 fireball shooters (small containers) for was previously sober for 5 months now somewhat intermittently drinking over last month.    Patient reports last use was this morning 7/25.  Pt SHAGGY: .23  Pt  endorses hx of DT  Pt  denies hx of seizures. Last seizure: N/A  Pt endorsing the following symptoms of withdrawal: Anxious  MSSA Score: still under influence    Pt denies acute mental health or medical concerns.   Pt endorses other drug use: (!) XANAX OR OTHER BENZOS Amount/frequency: N/A    Does Pt have a detox care plan in [facility]? No  Does pt present with specific needs, assistive devices, or exclusionary criteria? None  Is the patient ambulating, eating and drinking in the ED? Yes    A: Pt meets criteria to be presented for IP detox admission. Patient is voluntary    COVID Symptoms: No  If yes, COVID test required   Utox: Positive for benzos  Magnesium: WNL  CMP: Abnormalities: Sodium 146, Calcium 8.7, Chloride 108  CBC: Abnormalities: WBC Count 3.9, Absolute Neutrophils 1.4  HCG: N/A     R: Patient cleared and ready for behavioral bed placement: Yes    Pt is meeting criteria for presentation to 3A/CD    Does Patient need a Transfer Center request created? Yes, writer completed Transfer Center request at:

## 2024-07-25 NOTE — TELEPHONE ENCOUNTER
R:    7:15 PM Paged provider to review for 3A.    7:27 PM Provider accepted pt to [facility]/ [facility].    7:33 PM Admit board updated, and report info exchanged.    7:40 PM Pt placed in queue.    8:34 PM Indicia completed.

## 2024-07-25 NOTE — MEDICATION SCRIBE - ADMISSION MEDICATION HISTORY
Medication Scribe Admission Medication History    Admission medication history is complete. The information provided in this note is only as accurate as the sources available at the time of the update.    Information Source(s): Patient via in-person    Pertinent Information: Patient has not been med compliant/takes as she remembers. Discontinued Atorvastatin recently on her own    Changes made to PTA medication list:  Added: None  Deleted: Atorvastatin  Changed: Duloxetine is now 60 mg BID    Allergies reviewed with patient and updates made in EHR: yes    Medication History Completed By: Ene Lackey 7/25/2024 6:11 PM    PTA Med List   Medication Sig Last Dose    acamprosate (CAMPRAL) 333 MG EC tablet Take 333 mg by mouth 3 times daily 7/24/2024 at pm    ASHWAGANDHA PO Take 300 mg by mouth daily Past Week    Biotin 1000 MCG CHEW Take 1,000 mcg by mouth daily Past Week    chlordiazePOXIDE (LIBRIUM) 25 MG capsule Take 1 capsule (25 mg) by mouth 3 times daily as needed for withdrawal 7/24/2024 at pm    Cranberry (RA CRANBERRY) 500 MG CAPS Take 500 mg by mouth daily Past Month    DULoxetine (CYMBALTA) 60 MG capsule Take 60 mg by mouth 2 times daily 7/24/2024 at pm    fluticasone (FLONASE) 50 MCG/ACT nasal spray Spray 1 spray into both nostrils daily as needed Unknown at prn    hydrOXYzine (ATARAX) 10 MG tablet Take 10-20 mg by mouth every 8 hours as needed for anxiety or other (sleep) 7/25/2024 at am    ibuprofen (ADVIL/MOTRIN) 600 MG tablet Take 600 mg by mouth every 4 hours as needed for moderate pain Unknown at prn    magnesium oxide 400 MG CAPS Take 1 tablet by mouth 2 times daily 7/24/2024 at pm    Menatetrenone (VITAMIN K2) 100 MCG TABS Take 100 mcg by mouth daily 7/24/2024 at am    methocarbamol (ROBAXIN) 500 MG tablet Take 2 tablets by mouth every 8 hours as needed for muscle spasms 7/24/2024 at prn    Potassium Citrate,Elemental K, 99 MG CAPS Take 1 capsule by mouth daily More than a month    thiamine (B-1)  100 MG tablet Take 1 tablet (100 mg) by mouth daily Past Week    Turmeric 500 MG CAPS Take 1 capsule by mouth daily Past Week    vitamin A 3 MG (46973 UNITS) capsule Take 10,000 Units by mouth daily Past Week    vitamin D3 (CHOLECALCIFEROL) 125 MCG (5000 UT) tablet Take 5,000 Units by mouth daily Past Week

## 2024-07-25 NOTE — ED PROVIDER NOTES
History     Chief Complaint   Patient presents with    Alcohol Intoxication    Mental Health Problem     Sober until recently. Took EMS ~ 10 minutes to awake her. Currently alert. Depressed.      HPI  Holly Briseno is a 54 year old female with past medical history significant for alcohol abuse and history of alcohol withdrawal chronic migraines arthralgias with chronic pain.  History of suicidal ideation who presents emergency department via ambulance.  Patient daughter states patient and things over for up to 5 months and began drinking on Saturday.  And had a relapse last night when she went home.  She had been seen Saturday at [facility] and discharged.  At that time she did have some suicidal ideation and when EMS took her in today she said she did not want to be around.  Voicing suicidal ideation.  Patient was difficult to wake this morning and patient says she has been drinking also uses nicotine but no other drugs.  Patient states her last drink was prior to arrival.  Denies any recent falls denies headache does have generalized body ache which is typical for the patient.  Patient does not remember how much she drank but was drinking fireball.    Allergies:  Allergies   Allergen Reactions    Amoxicillin Rash     Per the Antimicrobial Stewardship Team 10/19:  No similarities in side chains for Amoxicillin and Ancef, very low to no risk of cross-reactivity.    Sulfa Antibiotics Hives, Itching and Rash       Problem List:    Patient Active Problem List    Diagnosis Date Noted    Alcohol withdrawal, uncomplicated (H) 01/06/2024     Priority: Medium    Arthralgia, unspecified joint 08/31/2023     Priority: Medium    Alcohol use disorder 08/31/2023     Priority: Medium    Alcohol use disorder, moderate, dependence (H) 08/13/2023     Priority: Medium    Alcohol intoxication, uncomplicated (H24) 08/13/2023     Priority: Medium    Intermittent palpitations 12/08/2022     Priority: Medium    Chronic migraine with  aura 08/17/2022     Priority: Medium     Formatting of this note might be different from the original.  had MRI brain approx 2006 by neurologist  Tried imitrex and Topamax and amitriptyline and side effects and no help      Alcohol use with withdrawal (H) 02/04/2021     Priority: Medium    Anxiety 02/04/2021     Priority: Medium    Major depressive disorder, recurrent, moderate (H) 02/04/2021     Priority: Medium    Suicidal ideation 02/03/2021     Priority: Medium        Past Medical History:    Past Medical History:   Diagnosis Date    Alcoholism (H)     Anxiety     Cancer (H) September 2021    Depressive disorder February 3 2021       Past Surgical History:    Past Surgical History:   Procedure Laterality Date    BIOPSY  September 2021    Melanoma       Family History:    Family History   Problem Relation Age of Onset    Substance Abuse Mother     Substance Abuse Father        Social History:  Marital Status:   [5]  Social History     Tobacco Use    Smoking status: Never    Smokeless tobacco: Never   Vaping Use    Vaping status: Never Used   Substance Use Topics    Alcohol use: Not Currently    Drug use: Never        Medications:    hydrOXYzine elise (VISTARIL) 25 MG capsule  acamprosate (CAMPRAL) 333 MG EC tablet  ASHWAGANDHA PO  atorvastatin (LIPITOR) 10 MG tablet  Biotin 1000 MCG CHEW  chlordiazePOXIDE (LIBRIUM) 25 MG capsule  Cranberry (RA CRANBERRY) 500 MG CAPS  DULoxetine (CYMBALTA) 30 MG capsule  DULoxetine (CYMBALTA) 60 MG capsule  fluticasone (FLONASE) 50 MCG/ACT nasal spray  hydrOXYzine (ATARAX) 10 MG tablet  hydrOXYzine HCl (ATARAX) 25 MG tablet  ibuprofen (ADVIL/MOTRIN) 600 MG tablet  magnesium oxide 400 MG CAPS  Menatetrenone (VITAMIN K2) 100 MCG TABS  methocarbamol (ROBAXIN) 750 MG tablet  Potassium Citrate,Elemental K, 99 MG CAPS  thiamine (B-1) 100 MG tablet  Turmeric 500 MG CAPS  vitamin A 3 MG (35561 UNITS) capsule  vitamin D3 (CHOLECALCIFEROL) 125 MCG (5000 UT) tablet          Review of  "Systems  As per HPI  Physical Exam   BP: 112/78  Pulse: 80  Temp: 97.6  F (36.4  C)  Resp: 12  Height: 165.1 cm (5' 5\")  Weight: 63.5 kg (140 lb)  SpO2: 98 %      Physical Exam  Vitals and nursing note reviewed.   Constitutional:       Appearance: Normal appearance. She is not ill-appearing, toxic-appearing or diaphoretic.      Comments: EtOH on breath patient is drowsy but easily arousable answers questions appropriately.   HENT:      Head: Normocephalic.      Nose: Nose normal.      Mouth/Throat:      Mouth: Mucous membranes are moist.      Pharynx: Oropharynx is clear.   Eyes:      Conjunctiva/sclera: Conjunctivae normal.   Cardiovascular:      Rate and Rhythm: Normal rate and regular rhythm.      Pulses: Normal pulses.      Heart sounds: Normal heart sounds. No murmur heard.  Pulmonary:      Effort: Pulmonary effort is normal.      Breath sounds: Normal breath sounds. No stridor. No wheezing or rhonchi.   Abdominal:      General: Abdomen is flat. Bowel sounds are normal. There is no distension.      Palpations: Abdomen is soft.      Tenderness: There is no abdominal tenderness. There is no right CVA tenderness or left CVA tenderness.   Musculoskeletal:         General: Normal range of motion.      Cervical back: Normal range of motion and neck supple.   Skin:     General: Skin is warm and dry.      Findings: No rash.   Neurological:      General: No focal deficit present.      Mental Status: She is alert and oriented to person, place, and time.      Sensory: No sensory deficit.      Motor: No weakness.      Coordination: Coordination normal.   Psychiatric:         Mood and Affect: Mood normal.         ED Course        Procedures              Critical Care time:  none               No results found for this or any previous visit (from the past 24 hour(s)).    Medications   sodium chloride 0.9 % 1,000 mL with Infuvite Adult 10 mL, thiamine 100 mg, folic acid 1 mg infusion (has no administration in time range) "       Assessments & Plan (with Medical Decision Making) records were reviewed including past medical history medications and allergies.  Recent ED visit on 7/21/2024 at [facility] was reviewed.  Rheumatology office visit from 7/11/2024 was reviewed.  Patient had labs drawn she was given a banana bag.  I independently reviewed and interpreted labs.  White count was 3.9 hemoglobin 13.7 platelet count 275.  Lipase was slightly elevated 65.  On reexam patient is not having any epigastric abdominal pain.  Patient's metabolic panel without significant abnormality UA was unremarkable.  Alcohol level was 0.23.  Urine tox screen positive for benzos.  Patient was observed for several hours she slept.  After about 4 hours she was decided she was sober enough to have a DEC consultation and this was placed. I feel patient was sober enough to communicate with DEC DEC, assessed patient and she denied any homicidal or suicidal abnormality.  She did not want to come into the hospital and we will try to find patient a detox bed at this time she is willing to do this.  I discussed with her denying suicidal ideation yesterday and requesting to go to detox.  Was accepted at [place] [facility] and will be transferred there for further evaluation and care.  Case was signed out to Dr. Olsen to monitor patient until sent to [facility].     I have reviewed the nursing notes.    I have reviewed the findings, diagnosis, plan and need for follow up with the patient.         Discharge Medication List as of 7/26/2024  3:00 AM          Final diagnoses:   Alcoholic intoxication without complication (H24)   Alcohol withdrawal syndrome without complication (H)   Chronic pain disorder       7/25/2024   [facility] EMERGENCY DEPT       Sandro Olmos MD  07/27/24 0819

## 2024-07-26 ENCOUNTER — HOSPITAL ENCOUNTER (INPATIENT)
Facility: CLINIC | Age: 54
LOS: 1 days | Discharge: HOME | DRG: 897 | End: 2024-07-27
Attending: PSYCHIATRY & NEUROLOGY | Admitting: PSYCHIATRY & NEUROLOGY
Payer: MEDICARE

## 2024-07-26 VITALS
DIASTOLIC BLOOD PRESSURE: 78 MMHG | HEIGHT: 65 IN | BODY MASS INDEX: 23.32 KG/M2 | HEART RATE: 64 BPM | WEIGHT: 140 LBS | OXYGEN SATURATION: 94 % | TEMPERATURE: 97.7 F | SYSTOLIC BLOOD PRESSURE: 127 MMHG | RESPIRATION RATE: 18 BRPM

## 2024-07-26 PROBLEM — F10.939 ALCOHOL WITHDRAWAL WITH COMPLICATION WITH INPATIENT TREATMENT, WITH UNSPECIFIED COMPLICATION (H): Status: ACTIVE | Noted: 2024-07-26

## 2024-07-26 PROBLEM — F13.939 BENZODIAZEPINE WITHDRAWAL (H): Status: ACTIVE | Noted: 2024-07-26

## 2024-07-26 LAB
CHOLEST SERPL-MCNC: 293 MG/DL
GGT SERPL-CCNC: 17 U/L (ref 5–36)
HDLC SERPL-MCNC: 69 MG/DL
LDLC SERPL CALC-MCNC: 206 MG/DL
LIPASE SERPL-CCNC: 44 U/L (ref 13–60)
NONHDLC SERPL-MCNC: 224 MG/DL
SODIUM SERPL-SCNC: 144 MMOL/L (ref 135–145)
TRIGL SERPL-MCNC: 92 MG/DL
TSH SERPL DL<=0.005 MIU/L-ACNC: 2.97 UIU/ML (ref 0.3–4.2)

## 2024-07-26 PROCEDURE — 83690 ASSAY OF LIPASE: CPT | Performed by: CLINICAL NURSE SPECIALIST

## 2024-07-26 PROCEDURE — 36415 COLL VENOUS BLD VENIPUNCTURE: CPT | Performed by: CLINICAL NURSE SPECIALIST

## 2024-07-26 PROCEDURE — 80061 LIPID PANEL: CPT | Performed by: CLINICAL NURSE SPECIALIST

## 2024-07-26 PROCEDURE — 250N000013 HC RX MED GY IP 250 OP 250 PS 637: Performed by: CLINICAL NURSE SPECIALIST

## 2024-07-26 PROCEDURE — 128N000004 HC R&B CD ADULT

## 2024-07-26 PROCEDURE — 250N000013 HC RX MED GY IP 250 OP 250 PS 637: Performed by: PSYCHIATRY & NEUROLOGY

## 2024-07-26 PROCEDURE — HZ2ZZZZ DETOXIFICATION SERVICES FOR SUBSTANCE ABUSE TREATMENT: ICD-10-PCS | Performed by: PSYCHIATRY & NEUROLOGY

## 2024-07-26 PROCEDURE — 82977 ASSAY OF GGT: CPT | Performed by: CLINICAL NURSE SPECIALIST

## 2024-07-26 PROCEDURE — 84295 ASSAY OF SERUM SODIUM: CPT | Performed by: PHYSICIAN ASSISTANT

## 2024-07-26 PROCEDURE — 99223 1ST HOSP IP/OBS HIGH 75: CPT | Mod: AI | Performed by: PSYCHIATRY & NEUROLOGY

## 2024-07-26 PROCEDURE — 99222 1ST HOSP IP/OBS MODERATE 55: CPT | Performed by: PHYSICIAN ASSISTANT

## 2024-07-26 PROCEDURE — 84443 ASSAY THYROID STIM HORMONE: CPT | Performed by: CLINICAL NURSE SPECIALIST

## 2024-07-26 RX ORDER — DIAZEPAM 10 MG/2ML
5-10 INJECTION, SOLUTION INTRAMUSCULAR; INTRAVENOUS EVERY 30 MIN PRN
Status: DISCONTINUED | OUTPATIENT
Start: 2024-07-26 | End: 2024-07-26

## 2024-07-26 RX ORDER — LOPERAMIDE HCL 2 MG
2 CAPSULE ORAL 4 TIMES DAILY PRN
Status: DISCONTINUED | OUTPATIENT
Start: 2024-07-26 | End: 2024-07-27 | Stop reason: HOSPADM

## 2024-07-26 RX ORDER — FLUMAZENIL 0.1 MG/ML
0.2 INJECTION, SOLUTION INTRAVENOUS
Status: DISCONTINUED | OUTPATIENT
Start: 2024-07-26 | End: 2024-07-26 | Stop reason: HOSPADM

## 2024-07-26 RX ORDER — MULTIPLE VITAMINS W/ MINERALS TAB 9MG-400MCG
1 TAB ORAL DAILY
Status: DISCONTINUED | OUTPATIENT
Start: 2024-07-26 | End: 2024-07-27 | Stop reason: HOSPADM

## 2024-07-26 RX ORDER — OLANZAPINE 5 MG/1
5-10 TABLET, ORALLY DISINTEGRATING ORAL EVERY 6 HOURS PRN
Status: DISCONTINUED | OUTPATIENT
Start: 2024-07-26 | End: 2024-07-26 | Stop reason: HOSPADM

## 2024-07-26 RX ORDER — TRAZODONE HYDROCHLORIDE 50 MG/1
50 TABLET, FILM COATED ORAL
Status: DISCONTINUED | OUTPATIENT
Start: 2024-07-26 | End: 2024-07-27 | Stop reason: HOSPADM

## 2024-07-26 RX ORDER — DIAZEPAM 5 MG
10 TABLET ORAL EVERY 30 MIN PRN
Status: DISCONTINUED | OUTPATIENT
Start: 2024-07-26 | End: 2024-07-26

## 2024-07-26 RX ORDER — ONDANSETRON 4 MG/1
4 TABLET, ORALLY DISINTEGRATING ORAL EVERY 6 HOURS PRN
Status: DISCONTINUED | OUTPATIENT
Start: 2024-07-26 | End: 2024-07-27 | Stop reason: HOSPADM

## 2024-07-26 RX ORDER — FOLIC ACID 1 MG/1
1 TABLET ORAL DAILY
Status: DISCONTINUED | OUTPATIENT
Start: 2024-07-26 | End: 2024-07-26 | Stop reason: HOSPADM

## 2024-07-26 RX ORDER — ATENOLOL 50 MG/1
50 TABLET ORAL DAILY PRN
Status: DISCONTINUED | OUTPATIENT
Start: 2024-07-26 | End: 2024-07-26

## 2024-07-26 RX ORDER — LORAZEPAM 2 MG/ML
1-2 INJECTION INTRAMUSCULAR EVERY 30 MIN PRN
Status: DISCONTINUED | OUTPATIENT
Start: 2024-07-26 | End: 2024-07-26 | Stop reason: HOSPADM

## 2024-07-26 RX ORDER — DIAZEPAM 5 MG
5-20 TABLET ORAL EVERY 30 MIN PRN
Status: DISCONTINUED | OUTPATIENT
Start: 2024-07-26 | End: 2024-07-27 | Stop reason: HOSPADM

## 2024-07-26 RX ORDER — LORAZEPAM 1 MG/1
1-2 TABLET ORAL EVERY 30 MIN PRN
Status: DISCONTINUED | OUTPATIENT
Start: 2024-07-26 | End: 2024-07-26 | Stop reason: HOSPADM

## 2024-07-26 RX ORDER — AMOXICILLIN 250 MG
1 CAPSULE ORAL 2 TIMES DAILY PRN
Status: DISCONTINUED | OUTPATIENT
Start: 2024-07-26 | End: 2024-07-27 | Stop reason: HOSPADM

## 2024-07-26 RX ORDER — HYDROXYZINE HYDROCHLORIDE 25 MG/1
25 TABLET, FILM COATED ORAL EVERY 4 HOURS PRN
Status: DISCONTINUED | OUTPATIENT
Start: 2024-07-26 | End: 2024-07-27 | Stop reason: HOSPADM

## 2024-07-26 RX ORDER — ACETAMINOPHEN 325 MG/1
650 TABLET ORAL EVERY 4 HOURS PRN
Status: DISCONTINUED | OUTPATIENT
Start: 2024-07-26 | End: 2024-07-27 | Stop reason: HOSPADM

## 2024-07-26 RX ORDER — HALOPERIDOL 5 MG/ML
2.5-5 INJECTION INTRAMUSCULAR EVERY 6 HOURS PRN
Status: DISCONTINUED | OUTPATIENT
Start: 2024-07-26 | End: 2024-07-26 | Stop reason: HOSPADM

## 2024-07-26 RX ORDER — FOLIC ACID 1 MG/1
1 TABLET ORAL DAILY
Status: DISCONTINUED | OUTPATIENT
Start: 2024-07-26 | End: 2024-07-27 | Stop reason: HOSPADM

## 2024-07-26 RX ORDER — MAGNESIUM HYDROXIDE/ALUMINUM HYDROXICE/SIMETHICONE 120; 1200; 1200 MG/30ML; MG/30ML; MG/30ML
30 SUSPENSION ORAL EVERY 4 HOURS PRN
Status: DISCONTINUED | OUTPATIENT
Start: 2024-07-26 | End: 2024-07-27 | Stop reason: HOSPADM

## 2024-07-26 RX ORDER — DULOXETIN HYDROCHLORIDE 60 MG/1
60 CAPSULE, DELAYED RELEASE ORAL DAILY
Status: DISCONTINUED | OUTPATIENT
Start: 2024-07-26 | End: 2024-07-27 | Stop reason: HOSPADM

## 2024-07-26 RX ORDER — MULTIPLE VITAMINS W/ MINERALS TAB 9MG-400MCG
1 TAB ORAL DAILY
Status: DISCONTINUED | OUTPATIENT
Start: 2024-07-26 | End: 2024-07-26 | Stop reason: HOSPADM

## 2024-07-26 RX ORDER — IBUPROFEN 600 MG/1
600 TABLET, FILM COATED ORAL EVERY 6 HOURS PRN
Status: DISCONTINUED | OUTPATIENT
Start: 2024-07-26 | End: 2024-07-27 | Stop reason: HOSPADM

## 2024-07-26 RX ADMIN — FOLIC ACID 1 MG: 1 TABLET ORAL at 08:59

## 2024-07-26 RX ADMIN — DULOXETINE HYDROCHLORIDE 60 MG: 60 CAPSULE, DELAYED RELEASE ORAL at 09:12

## 2024-07-26 RX ADMIN — THIAMINE HCL TAB 100 MG 100 MG: 100 TAB at 08:59

## 2024-07-26 RX ADMIN — Medication 1 TABLET: at 08:59

## 2024-07-26 RX ADMIN — ACETAMINOPHEN 650 MG: 325 TABLET, FILM COATED ORAL at 16:14

## 2024-07-26 ASSESSMENT — LIFESTYLE VARIABLES
ANXIETY: NO ANXIETY, AT EASE
HEADACHE, FULLNESS IN HEAD: NOT PRESENT
AUDITORY DISTURBANCES: NOT PRESENT
ORIENTATION AND CLOUDING OF SENSORIUM: ORIENTED AND CAN DO SERIAL ADDITIONS
VISUAL DISTURBANCES: NOT PRESENT
PAROXYSMAL SWEATS: NO SWEAT VISIBLE
AGITATION: NORMAL ACTIVITY
TOTAL SCORE: 1
TREMOR: NOT VISIBLE, BUT CAN BE FELT FINGERTIP TO FINGERTIP
NAUSEA AND VOMITING: NO NAUSEA AND NO VOMITING
NAUSEA AND VOMITING: NO NAUSEA AND NO VOMITING

## 2024-07-26 ASSESSMENT — ACTIVITIES OF DAILY LIVING (ADL)
ADLS_ACUITY_SCORE: 30
ADLS_ACUITY_SCORE: 36
ADLS_ACUITY_SCORE: 30
DRESS: SCRUBS (BEHAVIORAL HEALTH)
ADLS_ACUITY_SCORE: 30
ADLS_ACUITY_SCORE: 36
HYGIENE/GROOMING: INDEPENDENT
ADLS_ACUITY_SCORE: 30
HYGIENE/GROOMING: INDEPENDENT
LAUNDRY: WITH SUPERVISION
ADLS_ACUITY_SCORE: 30
ADLS_ACUITY_SCORE: 30
ORAL_HYGIENE: INDEPENDENT
ADLS_ACUITY_SCORE: 30

## 2024-07-26 NOTE — PHARMACY-ADMISSION MEDICATION HISTORY
Please see Admission Medication History note completed on 7/25 under previous encounter for information regarding prior to admission medications.    Monse Gunter, PharmD  *[phone]

## 2024-07-26 NOTE — PLAN OF CARE
Goal Outcome Evaluation:    Plan of Care Reviewed With: patient      Patient is up and visible in the milieu. Patient is ambulating with no problem. Patient is alert and oriented x 4. Patient is attending/participating in unit programming. Pt is social with peers. Affect is flat, mood is calm. Patient denies SI/SIB/HI. Pt denies auditory/visual hallucinations. Patient verbally contracts for safety on the unit.     Patient requested for prn ibuprofen earlier this morning for body aches.  MD notified, medication now ordered.  Patient  Took scheduled medications with no problem.    Pt's MSSA's = 3 and 4 this shift, no valium given. Patient reports a good appetite, and good sleep. Patient discharge plans is to go home. Rest, fluids, and food encouraged. Status 15 checks remain. Patient is able to make needs known appropriately. Patient denies any unmet needs at this time.     Blood pressure (!) 152/86, pulse 77, temperature 98.5  F (36.9  C), temperature source Oral, resp. rate 16, SpO2 100%.

## 2024-07-26 NOTE — H&P
"[facility], [place]   Psychiatric History and Physical  Admission date: 7/26/2024        Chief Complaint:   \"I feel shaky\"        HPI:     The patient is a 55yo female with a history of alcohol use disorder and depression who was admitted to detoxify from alcohol. She reports that she is feeling \"shaky\" and has \"brain fog.\" Did sleep well last night. Denies nausea. Mood is depressed and anxious as she recently tapered her Cymbalta down to 60mg Qday. Wants to get back on previous dose. Has had some recent SI but feels safe here. Was on Campral and is was helpful but she stopped taking it. Open to resuming. Doesn't want CD treatment but will resume her AA and NA groups.      Per ER:  Holly Briseno is a 54 year old female with past medical history significant for alcohol abuse and history of alcohol withdrawal chronic migraines arthralgias with chronic pain.  History of suicidal ideation who presents emergency department via ambulance.  Patient daughter states patient and things over for up to 5 months and began drinking on Saturday.  And had a relapse last night when she went home.  She had been seen Saturday at [facility] and discharged.  At that time she did have some suicidal ideation and when EMS took her in today she said she did not want to be around.  Voicing suicidal ideation.  Patient was difficult to wake this morning and patient says she has been drinking also uses nicotine but no other drugs.  Patient states her last drink was prior to arrival.  Denies any recent falls denies headache does have generalized body ache which is typical for the patient.  Patient does not remember how much she drank but was drinking fireball.         Past Psychiatric History:     MDD. On Cymbalta and Vistaril. Self tapered Cymbalta down to 60mg Qday. Suicide attempt in 12/2022.         Substance Use and History:     Alcohol use disorder. On Campral in the past.   Denies a history of withdrawal " "seizures or DTs.         Past Medical History:   PAST MEDICAL HISTORY:   Past Medical History:   Diagnosis Date    Alcoholism (H)     Anxiety     Cancer (H) September 2021    Melanoma    Depressive disorder February 3 2021       PAST SURGICAL HISTORY:   Past Surgical History:   Procedure Laterality Date    BIOPSY  September 2021    Melanoma             Family History:   FAMILY HISTORY:   Family History   Problem Relation Age of Onset    Substance Abuse Mother     Substance Abuse Father            Social History:   Please see the full psychosocial profile from the clinical treatment coordinator.   SOCIAL HISTORY:   Social History     Tobacco Use    Smoking status: Never    Smokeless tobacco: Never   Substance Use Topics    Alcohol use: Not Currently            Physical ROS:   The patient endorsed \"brain fog\". The remainder of 10-point review of systems was negative except as noted in HPI.         PTA Medications:     Medications Prior to Admission   Medication Sig Dispense Refill Last Dose    acamprosate (CAMPRAL) 333 MG EC tablet Take 333 mg by mouth 3 times daily       ASHWAGANDHA PO Take 300 mg by mouth daily       Biotin 1000 MCG CHEW Take 1,000 mcg by mouth daily       chlordiazePOXIDE (LIBRIUM) 25 MG capsule Take 1 capsule (25 mg) by mouth 3 times daily as needed for withdrawal 15 capsule 0     Cranberry (RA CRANBERRY) 500 MG CAPS Take 500 mg by mouth daily       DULoxetine (CYMBALTA) 60 MG capsule Take 60 mg by mouth 2 times daily       fluticasone (FLONASE) 50 MCG/ACT nasal spray Spray 1 spray into both nostrils daily as needed       hydrOXYzine (ATARAX) 10 MG tablet Take 10-20 mg by mouth every 8 hours as needed for anxiety or other (sleep)       ibuprofen (ADVIL/MOTRIN) 600 MG tablet Take 600 mg by mouth every 4 hours as needed for moderate pain       magnesium oxide 400 MG CAPS Take 1 tablet by mouth 2 times daily       Menatetrenone (VITAMIN K2) 100 MCG TABS Take 100 mcg by mouth daily       methocarbamol " (ROBAXIN) 500 MG tablet Take 2 tablets by mouth every 8 hours as needed for muscle spasms       Potassium Citrate,Elemental K, 99 MG CAPS Take 1 capsule by mouth daily       thiamine (B-1) 100 MG tablet Take 1 tablet (100 mg) by mouth daily 30 tablet 0     Turmeric 500 MG CAPS Take 1 capsule by mouth daily       vitamin A 3 MG (40258 UNITS) capsule Take 10,000 Units by mouth daily       vitamin D3 (CHOLECALCIFEROL) 125 MCG (5000 UT) tablet Take 5,000 Units by mouth daily             Allergies:     Allergies   Allergen Reactions    Amoxicillin Rash     Per the Antimicrobial Stewardship Team 10/19:  No similarities in side chains for Amoxicillin and Ancef, very low to no risk of cross-reactivity.    Sulfa Antibiotics Hives, Itching and Rash          Labs:     Recent Results (from the past 48 hour(s))   Comprehensive metabolic panel    Collection Time: 07/25/24 11:51 AM   Result Value Ref Range    Sodium 146 (H) 135 - 145 mmol/L    Potassium 4.3 3.4 - 5.3 mmol/L    Carbon Dioxide (CO2) 28 22 - 29 mmol/L    Anion Gap 10 7 - 15 mmol/L    Urea Nitrogen 9.9 6.0 - 20.0 mg/dL    Creatinine 0.89 0.51 - 0.95 mg/dL    GFR Estimate 78 >60 mL/min/1.73m2    Calcium 8.7 (L) 8.8 - 10.4 mg/dL    Chloride 108 (H) 98 - 107 mmol/L    Glucose 88 70 - 99 mg/dL    Alkaline Phosphatase 69 40 - 150 U/L    AST 24 0 - 45 U/L    ALT 19 0 - 50 U/L    Protein Total 7.2 6.4 - 8.3 g/dL    Albumin 4.4 3.5 - 5.2 g/dL    Bilirubin Total 0.2 <=1.2 mg/dL   Lipase    Collection Time: 07/25/24 11:51 AM   Result Value Ref Range    Lipase 65 (H) 13 - 60 U/L   Ethyl Alcohol Level    Collection Time: 07/25/24 11:51 AM   Result Value Ref Range    Alcohol ethyl 0.23 (H) <=0.01 g/dL   CBC with platelets and differential    Collection Time: 07/25/24 11:51 AM   Result Value Ref Range    WBC Count 3.9 (L) 4.0 - 11.0 10e3/uL    RBC Count 4.55 3.80 - 5.20 10e6/uL    Hemoglobin 13.7 11.7 - 15.7 g/dL    Hematocrit 40.7 35.0 - 47.0 %    MCV 90 78 - 100 fL    MCH 30.1  26.5 - 33.0 pg    MCHC 33.7 31.5 - 36.5 g/dL    RDW 12.6 10.0 - 15.0 %    Platelet Count 275 150 - 450 10e3/uL    % Neutrophils 35 %    % Lymphocytes 54 %    % Monocytes 6 %    % Eosinophils 4 %    % Basophils 1 %    % Immature Granulocytes 0 %    NRBCs per 100 WBC 0 <1 /100    Absolute Neutrophils 1.4 (L) 1.6 - 8.3 10e3/uL    Absolute Lymphocytes 2.1 0.8 - 5.3 10e3/uL    Absolute Monocytes 0.3 0.0 - 1.3 10e3/uL    Absolute Eosinophils 0.1 0.0 - 0.7 10e3/uL    Absolute Basophils 0.0 0.0 - 0.2 10e3/uL    Absolute Immature Granulocytes 0.0 <=0.4 10e3/uL    Absolute NRBCs 0.0 10e3/uL   Magnesium    Collection Time: 07/25/24 11:51 AM   Result Value Ref Range    Magnesium 2.1 1.7 - 2.3 mg/dL   UA with Microscopic reflex to Culture    Collection Time: 07/25/24  1:30 PM    Specimen: Urine, Midstream   Result Value Ref Range    Color Urine Straw Colorless, Straw, Light Yellow, Yellow    Appearance Urine Clear Clear    Glucose Urine Negative Negative mg/dL    Bilirubin Urine Negative Negative    Ketones Urine Negative Negative mg/dL    Specific Gravity Urine 1.004 1.003 - 1.035    Blood Urine Negative Negative    pH Urine 7.0 5.0 - 7.0    Protein Albumin Urine Negative Negative mg/dL    Urobilinogen Urine Normal Normal, 2.0 mg/dL    Nitrite Urine Negative Negative    Leukocyte Esterase Urine Trace (A) Negative    Bacteria Urine Few (A) None Seen /HPF    RBC Urine <1 <=2 /HPF    WBC Urine 1 <=5 /HPF    Squamous Epithelials Urine <1 <=1 /HPF   Urine Drug Screen Panel    Collection Time: 07/25/24  1:30 PM   Result Value Ref Range    Amphetamines Urine Screen Negative Screen Negative    Barbituates Urine Screen Negative Screen Negative    Benzodiazepine Urine Screen Positive (A) Screen Negative    Cannabinoids Urine Screen Negative Screen Negative    Cocaine Urine Screen Negative Screen Negative    Fentanyl Qual Urine Screen Negative Screen Negative    Opiates Urine Screen Negative Screen Negative    PCP Urine Screen Negative  Screen Negative          Physical and Psychiatric Examination:     /69 (BP Location: Right arm, Patient Position: Sitting, Cuff Size: Adult Regular)   Pulse 64   Temp 97.8  F (36.6  C) (Oral)   Resp 18   SpO2 98%   Weight is 0 lbs 0 oz  There is no height or weight on file to calculate BMI.    Physical Exam:  I have reviewed the physical exam as documented by the medical team and agree with findings and assessment and have no additional findings to add at this time.    Mental Status Exam:  Appearance: awake, alert and adequately groomed  Attitude:  somewhat cooperative  Eye Contact:  good  Mood:  depressed  Affect:  mood congruent  Speech:  clear, coherent  Language: fluent and intact in English  Psychomotor, Gait, Musculoskeletal:  no evidence of tardive dyskinesia, dystonia, or tics  Thought Process:  goal oriented  Associations:  no loose associations  Thought Content:  no evidence of psychotic thought and passive suicidal ideation present  Insight:  fair  Judgement:  fair  Oriented to:  time, person, and place  Attention Span and Concentration:  intact  Recent and Remote Memory:  fair  Fund of Knowledge:  appropriate         Admission Diagnoses:     Alcohol use disorder, severe  Alcohol withdrawal with unspecified complication   MDD, recurrent, moderate  Nicotine dependence with current use     Clinically Significant Risk Factors Present on Admission         # Hypernatremia: Highest Na = 146 mmol/L in last 2 days, will monitor as appropriate                            # Financial/Environmental Concerns:                     Assessment & Plan:     1) MSSA with Valium.   2) Continue Cymbalta at 60mg Qday. Titrate to previous dose of 120mg Qday after detox.   3) Will restart Campral after detox is complete.   4) Patient plans to detox only and resume AA and NA.     Disposition Plan   Reason for ongoing admission: requires detoxification from substance that poses a risk of bodily harm during withdrawal  period  Discharge location: home with family  Discharge Medications: not ordered  Follow-up Appointments: not scheduled  Legal Status: voluntary    Entered by: Jem Lew MD on 7/26/2024 at 5:42 AM

## 2024-07-26 NOTE — PLAN OF CARE
"  Problem: Alcohol Withdrawal  Goal: Alcohol Withdrawal Symptom Control  Outcome: Progressing   Goal Outcome Evaluation:    Pt continues to be monitored for alcohol withdrawal symptoms.  Pt denied symptoms all evening stating she feels better. Asked about the possibility of discharging today for Sikhism reasons.... to \"observe the sabbath.\"  After educating pt that she is still in detox, can only discharge when out of detox, pt called to inform her dtr, who she might have called earlier for a ride, to let her know she may be discharging  tomorrow. No further issues of concern. Pt was calm. Pleasant and cooperative.     MSSA:1/2. No valium given this shift.   B/P: 139/96, T: 97, P: 70, R: 16       "

## 2024-07-26 NOTE — CONSULTS
"Diagnostic Evaluation Consultation  Crisis Assessment    Patient Name: Holly Briseno  Age:  54 year old  Legal Sex: female  Gender Identity: female  Pronouns: she/her/hers  Race: White  Ethnicity: Not  or   Language: English      Patient was assessed: Virtual: Qiandao   Crisis Assessment Start Date: 07/25/24  Crisis Assessment Start Time: 1647  Crisis Assessment Stop Time: 1713  Patient location: [facility] EMERGENCY DEPT                             ED06    Referral Data and Chief Complaint  Holly Briseno presents to the ED via EMS. Patient is presenting to the ED for the following concerns: Suicidal ideation, Intoxication.   Factors that make the mental health crisis life threatening or complex are:  Pt presents for intoxication and suicide ideation. Pt reports, \"I drank a lot and I believe my kids called to have me checked on.\" Pt reports she does not recall making any suicidal comments to EMS. Pt reports she is not suicidal and denies SI/SIB/SA/HI and denies plans, means, or intent to harm herself or others. Pt reports she has experienced passive thoughts of suicide in the past week. Pt reports she releapsed on Saturday, after she was sober for 5 months. Pt reports she feels shame for relapsing, \"I also have shame about chronic pain for the past 20 years and that I self-medicate with alcohol.\" Pt reports she experiences pain, \"everywhere, across my neck, in my back and shouders, in my head and down to my lower back and legs.\" Pt reports she worked with a pain clinic in 2023, and they told her she can try pool therapy, biofeedback, or pain management with medications. Pt reports she thinks she needs the medications, \"because at this point, everything makes the pain worse; reading, cutting vegetables, coloring, driving, housecare.\" Pt reports she has an appointment with her pain clinic on Friday or Monday. Pt declines SUE services, and reports, \"I am not going to treatment again.\" Pt " also declines therapy and medication management. Pt reports willingness for detox..      Informed Consent and Assessment Methods  Explained the crisis assessment process, including applicable information disclosures and limits to confidentiality, assessed understanding of the process, and obtained consent to proceed with the assessment.  Assessment methods included conducting a formal interview with patient, review of medical records, collaboration with medical staff, and obtaining relevant collateral information from family and community providers when available.  : done     Patient response to interventions: acceptance expressed, verbalizes understanding  Coping skills were attempted to reduce the crisis:  none     History of the Crisis   Pt reports history of depression, anxiety, and alcohol abuse. Pt reports previous SUE treatment, last in Feb 2022. Pt reports she did SUE treatment for alcohol at [facility] in February of 2021 and did not find that helpful. Hx of ED visit for mental health August 2022, hospitalization for suicide attempt December 2022. Pt reports previously attending therapy months ago and has no plans to attend again. Pt denied any previous day treatment, or PHP. Pt attended 2 SUE programming and just got home in April from her most recent placement at [facility]. Pt was recommended for OP treatment eventually for a variety of reasons she decided not to go. Pt states she has been doing her own reading, personal growth work, AA meetings and sponsorships. Pt feels she needs to get help managing her physical pain so she isn't in pain 24/7 and doesn't have a headache all day long.    Brief Psychosocial History  Family:  , Children yes  Support System:  Children, Other (specify), Friend (aunt)  Employment Status:  unemployed, disabled  Source of Income:  social security  Financial Environmental Concerns:  unemployed  Current Hobbies:  social media/computer activities, reading,  "television/movies/videos, other (see comments), exercise/fitness, home improvement, music, outdoor activities, interaction with pets, cooking/baking, travel  Barriers in Personal Life:  lack of time, financial concerns, mental health concerns    Significant Clinical History  Current Anxiety Symptoms:     Current Depression/Trauma:  apathy, avoidance, negativistic, thoughts of death/suicide  Current Somatic Symptoms:     Current Psychosis/Thought Disturbance:     Current Eating Symptoms:     Chemical Use History:  Alcohol: Daily  Last Use:: 07/25/24  Benzodiazepines: None  Opiates: None  Cocaine: None  Marijuana: None  Other Use: None  Withdrawal Symptoms: Tremors, Other (comments) (sweating)   Past diagnosis:  Anxiety Disorder, Depression, Substance Use Disorder  Family history:  Substance Use Disorder  Past treatment:  Individual therapy, Primary Care, Residential Treatment, Inpatient Hospitalization, Case management, AA/NA  Details of most recent treatment:  Pt reports current involvement with case management and medication services.  Other relevant history:  Pt denied any legal issues or  involvement. Pt reports she lives alone.       Collateral Information  Is there collateral information: Yes     Collateral information name, relationship, phone number:  [phone] (Mehnaz) daughter    What happened today: Reports she found mom sleeping and couldn't wake up and reports she was fearful she wasn't drinking.     What is different about patient's functioning: Reports pt drank on Saturday and threatened to hurt herself, \"me and my brother brought her in\". Reports pt stayed with her over the past couple days and just returned home last night, \"and the same thing happened, she made the threats again\".     Concern about alcohol/drug use:      What do you think the patient needs:      Has patient made comments about wanting to kill themselves/others: yes    If d/c is recommended, can they take part in " "safety/aftercare planning:  no (Reports she can be an emotional support, but for the next few days can't be around to support her mother physically. \"She won't accept help and it's scary.\")    Additional collateral information:        Risk Assessment  [place] Suicide Severity Rating Scale Full Clinical Version:  Suicidal Ideation  Q1 Wish to be Dead (Lifetime): Yes  Q2 Non-Specific Active Suicidal Thoughts (Lifetime): Yes  3. Active Suicidal Ideation with any Methods (Not Plan) Without Intent to Act (Lifetime): Yes  Q4 Active Suicidal Ideation with Some Intent to Act, Without Specific Plan (Lifetime): Yes  Q5 Active Suicidal Ideation with Specific Plan and Intent (Lifetime): Yes  Q6 Suicide Behavior (Lifetime): yes     Suicidal Behavior (Lifetime)  Actual Attempt (Lifetime): Yes  Total Number of Actual Attempts (Lifetime): 1  Has subject engaged in non-suicidal self-injurious behavior? (Lifetime): No  Interrupted Attempts (Lifetime): No  Aborted or Self-Interrupted Attempt (Lifetime): No  Preparatory Acts or Behavior (Lifetime): No    [place] Suicide Severity Rating Scale Recent:   Suicidal Ideation (Recent)  Q1 Wished to be Dead (Past Month): yes  Q2 Suicidal Thoughts (Past Month): no  Q3 Suicidal Thought Method: no  Q4 Suicidal Intent without Specific Plan: no  Q5 Suicide Intent with Specific Plan: no  If yes to Q6, within past 3 months?: no  Level of Risk per Screen: moderate risk  Intensity of Ideation (Recent)  Most Severe Ideation Rating (Past 1 Month): 1  Frequency (Past 1 Month): Less than once a week  Duration (Past 1 Month): Fleeting, few seconds or minutes  Controllability (Past 1 Month): Easily able to control thoughts  Deterrents (Past 1 Month): Deterrents definitely stopped you from attempting suicide  Reasons for Ideation (Past 1 Month): Completely to end or stop the pain (You couldn't go on living with the pain or how you were feeling)  Suicidal Behavior (Recent)  Actual Attempt (Past 3 Months): " No  Has subject engaged in non-suicidal self-injurious behavior? (Past 3 Months): No  Interrupted Attempts (Past 3 Months): No  Aborted or Self-Interrupted Attempt (Past 3 Months): No  Preparatory Acts or Behavior (Past 3 Months): No    Environmental or Psychosocial Events: worsening chronic illness, ongoing abuse of substances, excessive debt, poor finances  Protective Factors: Protective Factors: strong bond to family unit, community support, or employment, lives in a responsibly safe and stable environment, other (see comment), cultural, spiritual , or Oriental orthodox beliefs associated with meaning and value in life, constructive use of leisure time, enjoyable activities, resilience, optimistic outlook - identification of future goals, good problem-solving, coping, and conflict resolution skills, good impulse control, sense of importance of health and wellness, able to access care without barriers, supportive ongoing medical and mental health care relationships, sense of belonging, sense of self-efficacy and/or positive self-esteem    Does the patient have thoughts of harming others? Feels Like Hurting Others: no  Previous Attempt to Hurt Others: no  Is the patient engaging in sexually inappropriate behavior?: no    Is the patient engaging in sexually inappropriate behavior?  no        Mental Status Exam   Affect: Flat  Appearance: Appropriate  Attention Span/Concentration: Attentive  Eye Contact: Engaged    Fund of Knowledge: Appropriate   Language /Speech Content: Fluent  Language /Speech Volume: Normal  Language /Speech Rate/Productions: Normal  Recent Memory: Intact  Remote Memory: Intact  Mood: Normal  Orientation to Person: Yes   Orientation to Place: Yes  Orientation to Time of Day: Yes  Orientation to Date: Yes     Situation (Do they understand why they are here?): Yes  Psychomotor Behavior: Normal  Thought Content: Clear  Thought Form: Intact     Mini-Cog Assessment  Number of Words Recalled:    Clock-Drawing  Test:     Three Item Recall:    Mini-Cog Total Score:       Medication  Psychotropic medications:   Medication Orders - Psychiatric (From admission, onward)      Start     Dose/Rate Route Frequency Ordered Stop    07/25/24 1900  DULoxetine (CYMBALTA) DR capsule 60 mg         60 mg Oral 2 TIMES DAILY 07/25/24 1857               Current Care Team  Patient Care Team:  Lizzeth Mancia MD as PCP - General (Family Medicine)  David Morrow as Therapist  Ivis Lozano MD as Assigned Rheumatology Provider    Diagnosis  Patient Active Problem List   Diagnosis Code    Alcohol use with withdrawal (H) F10.939    Anxiety F41.9    Chronic migraine with aura G43.E09    Intermittent palpitations R00.2    Major depressive disorder, recurrent, moderate (H) F33.1    Suicidal ideation R45.851    Alcohol use disorder, moderate, dependence (H) F10.20    Alcohol intoxication, uncomplicated (H24) F10.920    Arthralgia, unspecified joint M25.50    Alcohol use disorder F10.90    Alcohol withdrawal, uncomplicated (H) F10.930       Primary Problem This Admission  Active Hospital Problems    Alcohol use disorder, moderate, dependence (H)      *Major depressive disorder, recurrent, moderate (H)        Clinical Summary and Substantiation of Recommendations   After therapeutic assessment, intervention and aftercare planning by ED care team and LM and in consultation with attending provider, the patient's circumstances and mental state were appropriate for detox and outpatient management. It is the recommendation of this clinician that pt discharge with OP MH support. A this time the pt is not presenting as an acute risk to self or others due to the following factors: Pt presents for intoxication and suicide ideation. Pt admits to passive SI in the past week, but denies current SI/SIB/SA/HI and denies plans, means, or intent to harm herself or others. Pt denies hallucinations or delusions and does not appear to be  responding to internal stimuli. Pt declines all outpatient services; therapy, medication management, and SUE assessment/treatment. Pt reports she has an upcoming appointment with her pain manager and is open to detox.                          Patient coping skills attempted to reduce the crisis:  none    Disposition  Recommended disposition: Detox, Rule 25/SUE Assessment        Reviewed case and recommendations with attending provider. Attending Name: Dr. Olmos       Attending concurs with disposition: yes       Patient and/or validated legal guardian concurs with disposition:   yes       Final disposition:  discharge    Legal status on admission: Voluntary/Patient has signed consent for treatment    Assessment Details   Total duration spent with the patient: 26 min     CPT code(s) utilized: Non-Billable    QUINTON Aldrich, Psychotherapist  [facility] - Triage & Transition Services  Callback: [phone]

## 2024-07-26 NOTE — ED PROVIDER NOTES
Signout from Dr. Olmos and change of shift. Please see his notes for details.     Awaiting detox bed at [place].     CIWA protocol started while waiting.     No acute events during my shift.     It is the end of my shift and care of patient signed out to Dr. Carter for further cares while in the department.      Girish Bhakta MD, MD  07/26/24 0046

## 2024-07-26 NOTE — PROGRESS NOTES
[address]     Case Management Encounter: Met with the patient this morning and discussed aftercare plans. The patient is very adimit on not going to inpatient treatment. The patient believes that AA and community support will benefit her the most. This writer encouraged her to talk with staff if she changed her mind.     The patient was informed about different apps on her phone to locate AA meetings and support groups.     Insurance: Medicare     Legal Status: Voluntary     SUDs Assessment Status: None     ROIs on file: None     Living Situation: By herself     RN updated.    Jose L Crabtree  [address] - Adult Inpatient Addiction Psychiatry Unit

## 2024-07-26 NOTE — PROGRESS NOTES
Storage Bin: Clothes, Sandals, Bracelet.   Med Bin: Phone, Keys. E-Cig    A               Admission:  I am responsible for any personal items that are not sent to the safe or pharmacy.  [facility] is not responsible for loss, theft or damage of any property in my possession.    Signature:  _________________________________ Date: _______  Time: _____                                              Staff Signature:  ____________________________ Date: ________  Time: _____      2nd Staff person, if patient is unable/unwilling to sign:    Signature: ________________________________ Date: ________  Time: _____     Discharge:  [facility] has returned all of my personal belongings:    Signature: _________________________________ Date: ________  Time: _____                                          Staff Signature:  ____________________________ Date: ________  Time: _____

## 2024-07-26 NOTE — PROGRESS NOTES
"  Problem: Substance Withdrawal  Signs and symptoms of listed problems will be absent or manageable.   SBAR    S = Situation:   Patient is a 54 year old female who represents with complaint of etoh withdrawal. She binge drinks one day another and the cycle continues. Reports started drinking at age 16 and that is when the problem began. She has hx. Migraines and chronic pain' \"I have an appointment on Monday at the pain clinic hope they will describe me medications to manage my pain.\" She currently denied being in pain. She is here voluntary admit for etoh withdrawal. She doesnot remember her last alcohol drink intake was.  B  = Background:       Substance use history:Alcohol and cues Tobacco  Mental health history:None  Medical history: none  Legal history:Voluntary  Treatment history: [facility]  Living situation:lives by herself  Recent life stressors:Bad  relationship  A  =  Assessment:   Vital Signs:   BP Readings from Last 3 Encounters:   07/26/24 127/69   07/26/24 127/78   07/22/24 (!) 149/73      Notable labs: UTOX; Benzos    Alert and oriented X 3; Denies SI, SIB, HI, and HA. Restricted affect, mood is calm and she maintains eye contact  speech clear coherent,      R =   Request or Recommendation:   Alcohol withdrawal monitoring,   to evaluate, case management to see--pt reports   MSSA protocol with   Routine liabs including GGT, TSH with reflux, B12, lipid panel,  Comfort meds including zofran, immodium, trazadone, visterol, multivit, thiamine, folic acid, maalox, senna nicotine replacemen    "

## 2024-07-26 NOTE — PLAN OF CARE
Problem: Alcohol Withdrawal  Goal: Alcohol Withdrawal Symptom Control  Outcome: Progressing   Goal Outcome Evaluation:Ongoing    Patient appears to be asleep most of the night. No s/s of pain nor discomfort noted, intermittent body movements and even unlabored respirations were observed during the 15 minute safety checks throughout the night. Patient slept for 1.5 hrs at night. No safety concerns noted.

## 2024-07-26 NOTE — DISCHARGE INSTRUCTIONS
Behavioral Discharge Planning and Instructions  THANK YOU FOR CHOOSING [facility]  3A  [phone]    Summary: You were admitted to [facility] on 07/25/2024 for detoxification from alcohol.  A medical exam was performed that included lab work. You have met with a  and opted to return home and attend AA meetings.  Please take care and make your recovery a daily priority, Holly!  It was a pleasure working with you and the entire treatment team here wishes you the very best in your recovery!     Recommendation:  If you are struggling with alcohol or substance abuse, please contact [facility] to schedule an outpatient SUE assessment.     [facility] - Assessments by appointment.  [address] [address]  [phone]    Main Diagnoses:  Per Dr. Jem Lew MD;  303.90 (F10.20) Alcohol Use Disorder Severe    Major Treatments, Procedures and Findings:  You were treated for Valium detoxification using Valium. You did not complete a chemical dependency assessment. You had labs drawn and those results were reviewed with you. Please take a copy of your lab work with you to your next primary care provider appointment.    Symptoms to Report:  If you experience more anxiety, confusion, sleeplessness, deep sadness or thoughts of suicide, notify your treatment team or notify your primary care provider. IF ANY OF THE SYMPTOMS YOU ARE EXPERIENCING ARE A MEDICAL EMERGENCY CALL 911 IMMEDIATELY.     Lifestyle Adjustment: Adjust your lifestyle to get enough sleep, relaxation, exercise and good nutrition. Continue to develop healthy coping skills to decrease stress and promote a sober living environment. Do not use mood altering substances including alcohol, illegal drugs or addictive medications other than what is currently prescribed.     Disposition: Return home and attend AA meetings    Facts about COVID19 at www.cdc.gov/COVID19 and www.[place].gov/covid19    Keeping  hands clean is one of the most important steps we can take to avoid getting sick and spreading germs to others.  Please wash your hands frequently and lather with soap for at least 20 seconds!    Follow-up Appointment: PLEASE ARRIVE  MINUTES EARLY  Appointment Date/Time: Tuesday, July 30 th at 2:40 PM     Psychiatrist/Primary Care Giver:   Dr. Lizzeth Mera  [facility]       Address:   [address] [place] [address]     Phone Number:   [phone] (Work)   [phone] (Fax)        Recovery apps for your phone for educational purposes and to locate in person and zoom recovery meetings  Everything AA - educational purpose and kev is a great resource  12 Step Toolkit - educational purpose learning about the 12 steps to recovery  [place] Cloud - meeting kev  AA  - meeting kev  Meeting guide - meeting kev  Quick NA meeting - meeting kev  Michael- has various apps    Resources:  Some AA/NA meetings are being held online however most have returned to in-person or a hybrid combination please check online to verify*  Need Support Now? If you or someone you know is struggling or in crisis, help is available. Call or text [employer] or chat [employer].org  AA meetings search for them at: https://aa-intergroup.org (worldwide meeting listings)  AA meetings for [place] area can be found online at: https://a[place].org (click local online meetings listings)  NA meetings for [place] area can be found online at: https://www.nam[place].org  (click find a meeting)  AA and NA Sponsors are excellent resources for support and you can find one at any support group meeting.   Alcoholics Anonymous (https://aa.org/): for information 24 hours/day  AA Intergroup service office in [place] (http://www.aastpaul.org/) 820.674.8458  AA Intergroup service office in [facility]: [phone]. (http://www.a[place].org/)  Narcotics Anonymous (www.nam[place].org)  "(211) 119-5155  https://aafairviewriverside.org/meetings  SMART Recovery - self management for addiction recovery:  www.smartrecovery.org  Pathways ~ A Health Crisis Resource & Support Center:  444.434.5885.  https://prescribetoprevent.org/patient-education/videos/  http://www.harmreduction.org  Crisis Text Line  Text 391113  You will be connected with a trained live crisis counselor to provide support. Por espanol, texto  DELBERT a 948796 o texto a 442-AYUDAME en WhatsApp  [place] Hope Line  1.800.SUICIDE [0752912]  [facility] 465-477-1180  Support Group:  AA/RADHA and Sponsor/support.  Fast Tracker  Linking people to mental health and substance use disorder resources  [employer]   [place] Mental Health Warm Line  Peer to peer support  Monday thru Saturday, 12 pm to 10 pm  875.176.7688 or 1.213.811.6846  Text \"Support\" to 09864  National [place] on Mental Illness (MARBIN)  823.315.7323 or 1888.MARBIN.HELPS  Alcoholics Anonymous (www.alcoholics-anonymous.org): Check your phone book for your local chapter.  Suicide Awareness Voices of Education (SAVE) (www.save.org): 800-592-CQND (9327)  National Suicide Prevention Line (www.mentalhealthmn.org): 931-728-LQEN (6077)  Mental Health Consumer/Survivor Network of [place] (www.mhcsn.net): 835.764.1497 or 564-642-6785  Mental Health Association of [place] (www.mentalhealth.org): 897.959.7082 or 947-994-6268   Substance Abuse and Mental Health Services (www.samhsa.gov)  [place] Opioid Prevention Coalition: www.opioidcoalition.org    [place] Recovery Connection (MRC)  MRC connects people seeking recovery to resources that help foster and sustain long-term recovery.  Whether you are seeking resources for treatment, transportation, housing, job training, education, health care or other pathways to recovery, [facility] is a great place to start.  268.274.7225.  www.[employer].org    Great Pod casts for nutrition and wellness  Listen on Apple " Podcasts  Dishing Up Nutrition   N[employer]rition[employer] Weight [employer] Well[employer]   Nutrition       Understand the connection between what you eat and how you feel. Hosted by licensed nutritionists and dietitians from [employer] we share practical, real-life solutions for healthier living through nutrition.     General Medication Instructions:   See your medication sheet(s) for instructions.   Take all medications as prescribed.  Make no changes unless your primary care provider suggests them.   Go to all your primary care provider visits.  Be sure to have all your required lab tests. This way, your medicines can be refilled on time.  Do not use any forms of alcohol.    Please Note:  If you have any questions at anytime after you are discharged please call [facility] detox unit 3AW at [phone].  [facility], Behavioral Intake [phone]  Medical Records call [phone]  Outpatient Behavioral Intake call [phone]  LP+ Wait List/Bed Availability call [phone]    Please remember to take all of your behavioral discharge planning and lab paperwork to any follow up appointments, it contains your lab results, diagnosis, medication list and discharge recommendations.      THANK YOU FOR CHOOSING [facility]

## 2024-07-26 NOTE — CONSULTS
[facility]  Internal Medicine Consult     Holly Briseno MRN# [id number]   Age: 54 year old YOB: 1970     Date of Admission: 7/26/2024  Date of Consult: 7/26/2024    Primary Care Provider: Lizzeth Mancia    Requesting Service: Behavioral Health - Jem Lew MD  Reason for Consult: General Medical Evaluation      SUBJECTIVE   CC:   Alcohol use disorder    Assessment and Plan/Recommendations:     Holly Briseno is a 54 year old female with PMHx significant for alcohol use disorder presents for detox.    # Alcohol withdrawal, hx of alcohol use disorder   - MSSA 3 this shift.  no hx of withdrawal seizures, last occurring na.  Pt reports drinking daily. Blood EtOH on arrival was elevated.   - Continue MSSA   - Folvite, multi-vites, thiamine supplementation   - Further management per Psychiatry     # Chronic pain  - 20 year history of all over body pain. Has been seen by various specialists and rheumatology. No diagnosis. No change in symptoms.   - meeting with pain clinic next week    # Mild hypernatremia  - likely 2/2 dehydration  - will trend NA, encourage fluids    # Mild leukopenia   - repeat    # Other   - multiple vitamins on med list. Med rec pending. Ok to resume at discharge or order here if patient prefers.       Will follow labs    Sandro King PA-C  Internal Medicine [facility] Hospitalist  Page job code 1906 (3B), 1052 (3A), or [id number] ([facility] and )  Text paging via [employer] is appreciated  July 26, 2024         HPI:   Holly Briseno is a 54 year old female with PMHx significant for alcohol use disorder and chronic pain presents for detox.    Patient reports daily alcohol use last drink yesterday, currently feels fatigued and anxious. No other drug use. Hx of chronic pain x 2 years. Meeting with pain clinic next week. Pain is a trigger for drinking.    No chronic medical issues. No fevers, chills. No chest pain or shortness of breath. No  abdominal pain, nausea or vomiting. No urinary symptoms.        Past Medical History:     Past Medical History:   Diagnosis Date    Alcoholism (H)     Anxiety     Cancer (H) September 2021    Melanoma    Depressive disorder February 3 2021        Reviewed and updated in [facility].     Past Surgical History:      Past Surgical History:   Procedure Laterality Date    BIOPSY  September 2021    Melanoma         Social History:     Social History     Tobacco Use    Smoking status: Never    Smokeless tobacco: Never   Vaping Use    Vaping status: Never Used   Substance Use Topics    Alcohol use: Not Currently    Drug use: Never        Family History:     Family History   Problem Relation Age of Onset    Substance Abuse Mother     Substance Abuse Father          Allergies:     Allergies   Allergen Reactions    Amoxicillin Rash     Per the Antimicrobial Stewardship Team 10/19:  No similarities in side chains for Amoxicillin and Ancef, very low to no risk of cross-reactivity.    Sulfa Antibiotics Hives, Itching and Rash         Medications:   Reviewed. Please see MAR     Review of Systems:   10 point ROS of systems including Constitutional, Eyes, Respiratory, Cardiovascular, Gastroenterology, Genitourinary, Integumentary, Muscularskeletal, Psychiatric were all negative except for pertinent positives noted in my HPI.    OBJECTIVE   Physical Exam:   Vitals were reviewed  Blood pressure (!) 152/86, pulse 77, temperature 98.5  F (36.9  C), temperature source Oral, resp. rate 16, SpO2 100%.  General: Alert and oriented x3. Somewhat tremulous   HEENT: Anicteric sclera, MMM  Cardiovascular: RRR, S1S2. No murmur noted  Lungs: CTAB without wheezing or crackles   GI: Abdomen soft, non-tender without rebound or guarding. + Bowel sounds.  Vascular: No peripheral edema, distal pulses palpable  Neurologic: No focal deficits, CN II-XII grossly intact  Skin: No jaundice, rashes, or lesions        Data:        Lab Results   Component Value Date      07/25/2024    Lab Results   Component Value Date    CHLORIDE 108 07/25/2024    CHLORIDE 110 08/11/2022    Lab Results   Component Value Date    BUN 9.9 07/25/2024    BUN 6 08/11/2022      Lab Results   Component Value Date    POTASSIUM 4.3 07/25/2024    POTASSIUM 3.6 08/11/2022    Lab Results   Component Value Date    CO2 28 07/25/2024    CO2 26 08/11/2022    Lab Results   Component Value Date    CR 0.89 07/25/2024        Lab Results   Component Value Date    WBC 3.9 (L) 07/25/2024    HGB 13.7 07/25/2024    HCT 40.7 07/25/2024    MCV 90 07/25/2024     07/25/2024     Lab Results   Component Value Date    WBC 3.9 (L) 07/25/2024      Medical Decision Making       45 MINUTES SPENT BY ME on the date of service doing chart review, history, exam, documentation & further activities per the note.

## 2024-07-26 NOTE — CONSULTS
Behavioral Team Discussion: (7/26/2024)    Continued Stay Criteria/Rationale: Patient admitted for Chemical Use Issues.  Plan: The following services will be provided to the patient; psychiatric assessment, medication management, therapeutic milieu, individual and group support, and skills groups.   Participants: 3A Provider: Dr. Jem Lew MD; 3A RN: Dominique Davenport RN; 3A CM's:  HUGO Izquierdo .  Summary/Recommendation: Providers will assess today for treatment recommendations, discharge planning, and aftercare plans. CM will meet with pt for discharge planning.   Medical/Physical: pt reports chronic pain.  Precautions:   Behavioral Orders   Procedures    Code 1 - Restrict to Unit    Routine Programming     As clinically indicated    Status 15     Every 15 minutes.    Withdrawal precautions     Rationale for change in precautions or plan: N/A  Progress: Initial.    ASAM Dimension Scale Ratings:  Dimension 1: 1 Client can tolerate and cope with withdrawal discomfort. The client displays mild to moderate intoxication or signs and symptoms interfering with daily functioning but does not immediately endanger self or others. Client poses minimal risk of severe withdrawal.  Dimension 2: 1 Client tolerates and lotus with physical discomfort and is able to get the services that the client needs.  Dimension 3: 2 Client has difficulty with impulse control and lacks coping skills. Client has thoughts of suicide or harm to others without means; however, the thoughts may interfere with participation in some treatment activities. Client has difficulty functioning in significant life areas. Client has moderate symptoms of emotional, behavioral, or cognitive problems. Client is able to participate in most treatment activities.  Dimension 4: 2 Client displays verbal compliance, but lacks consistent behaviors; has low motivation for change; and is passively involved in treatment.  Dimension 5: 4 No awareness of the negative  impact of mental health problems or substance abuse. No coping skills to arrest mental health or addiction illnesses, or prevent relapse.  Dimension 6: 4 Client has (A) Chronically antagonistic significant other, living environment, family, peer group or long-term criminal justice involvement that is harmful to recovery or treatment progress, or (B) Client has an actively antagonistic significant other, family, work, or living environment with immediate threat to the client's safety and well-being.

## 2024-07-27 VITALS
RESPIRATION RATE: 16 BRPM | HEART RATE: 67 BPM | DIASTOLIC BLOOD PRESSURE: 81 MMHG | OXYGEN SATURATION: 97 % | TEMPERATURE: 97.3 F | SYSTOLIC BLOOD PRESSURE: 118 MMHG

## 2024-07-27 LAB
ERYTHROCYTE [DISTWIDTH] IN BLOOD BY AUTOMATED COUNT: 12.4 % (ref 10–15)
HCT VFR BLD AUTO: 41.5 % (ref 35–47)
HGB BLD-MCNC: 14 G/DL (ref 11.7–15.7)
MCH RBC QN AUTO: 30.3 PG (ref 26.5–33)
MCHC RBC AUTO-ENTMCNC: 33.7 G/DL (ref 31.5–36.5)
MCV RBC AUTO: 90 FL (ref 78–100)
PLATELET # BLD AUTO: 246 10E3/UL (ref 150–450)
RBC # BLD AUTO: 4.62 10E6/UL (ref 3.8–5.2)
WBC # BLD AUTO: 5.4 10E3/UL (ref 4–11)

## 2024-07-27 PROCEDURE — 250N000013 HC RX MED GY IP 250 OP 250 PS 637: Performed by: CLINICAL NURSE SPECIALIST

## 2024-07-27 PROCEDURE — 99239 HOSP IP/OBS DSCHRG MGMT >30: CPT | Performed by: PSYCHIATRY & NEUROLOGY

## 2024-07-27 PROCEDURE — 85027 COMPLETE CBC AUTOMATED: CPT | Performed by: PHYSICIAN ASSISTANT

## 2024-07-27 PROCEDURE — 250N000013 HC RX MED GY IP 250 OP 250 PS 637: Performed by: PSYCHIATRY & NEUROLOGY

## 2024-07-27 PROCEDURE — 36415 COLL VENOUS BLD VENIPUNCTURE: CPT | Performed by: PHYSICIAN ASSISTANT

## 2024-07-27 RX ADMIN — FOLIC ACID 1 MG: 1 TABLET ORAL at 08:37

## 2024-07-27 RX ADMIN — THIAMINE HCL TAB 100 MG 100 MG: 100 TAB at 08:37

## 2024-07-27 RX ADMIN — ACETAMINOPHEN 650 MG: 325 TABLET, FILM COATED ORAL at 08:37

## 2024-07-27 RX ADMIN — DULOXETINE HYDROCHLORIDE 60 MG: 60 CAPSULE, DELAYED RELEASE ORAL at 08:37

## 2024-07-27 RX ADMIN — Medication 1 TABLET: at 08:37

## 2024-07-27 ASSESSMENT — ACTIVITIES OF DAILY LIVING (ADL)
ADLS_ACUITY_SCORE: 30

## 2024-07-27 NOTE — PLAN OF CARE
Problem: Alcohol Withdrawal  Goal: Alcohol Withdrawal Symptom Control  7/27/2024 0557 by Juve Ott RN  Outcome: Met   Goal Outcome Evaluation:      Pt was admitted for alcohol detox. Monitored and treated using MSSA and volume.   Pt has not needed any valium for withdrawal since 0400 yesterday. She was therefore taken out of detox on this shift per protocol.     MSSA:1   B/P: 128/82, T: 97.8, P: 60, R: 16

## 2024-07-27 NOTE — DISCHARGE SUMMARY
"Psychiatric Discharge Summary    Holly Briseno MRN# [id number]   Age: 54 year old YOB: 1970     Date of Admission:  7/26/2024  Date of Discharge:  7/27/2024 12:58 PM  Admitting Physician:  Jem Lew MD  Discharge Physician:  Jem Lew MD          Event Leading to Hospitalization:   The patient is a 53yo female with a history of alcohol use disorder and depression who was admitted to detoxify from alcohol. She reports that she is feeling \"shaky\" and has \"brain fog.\" Did sleep well last night. Denies nausea. Mood is depressed and anxious as she recently tapered her Cymbalta down to 60mg Qday. Wants to get back on previous dose. Has had some recent SI but feels safe here. Was on Campral and is was helpful but she stopped taking it. Open to resuming. Doesn't want CD treatment but will resume her AA and NA groups.         See Admission note by Jem Lew MD for additional details.          Diagnoses:     Alcohol use disorder, severe  Alcohol withdrawal with unspecified complication   MDD, recurrent, moderate  Nicotine dependence with current use     Clinically Significant Risk Factors         # Hypernatremia: Highest Na = 146 mmol/L in last 2 days, will monitor as appropriate                         # Financial/Environmental Concerns:                  Labs:        Lab Results   Component Value Date     07/26/2024    Lab Results   Component Value Date    CHLORIDE 108 07/25/2024    CHLORIDE 110 08/11/2022    Lab Results   Component Value Date    BUN 9.9 07/25/2024    BUN 6 08/11/2022      Lab Results   Component Value Date    POTASSIUM 4.3 07/25/2024    POTASSIUM 3.6 08/11/2022    Lab Results   Component Value Date    CO2 28 07/25/2024    CO2 26 08/11/2022    Lab Results   Component Value Date    CR 0.89 07/25/2024          Lab Results   Component Value Date    WBC 3.9 (L) 07/25/2024    HGB 13.7 07/25/2024    HCT 40.7 07/25/2024    MCV 90 07/25/2024     " 07/25/2024     Lab Results   Component Value Date    AST 24 07/25/2024    ALT 19 07/25/2024    GGT 17 07/26/2024    ALKPHOS 69 07/25/2024    BILITOTAL 0.2 07/25/2024     Lab Results   Component Value Date    TSH 2.97 07/26/2024            Consults:   Consultation during this admission received from internal medicine:  Holly Briseno is a 54 year old female with PMHx significant for alcohol use disorder presents for detox.     # Alcohol withdrawal, hx of alcohol use disorder   - MSSA 3 this shift.  no hx of withdrawal seizures, last occurring na.  Pt reports drinking daily. Blood EtOH on arrival was elevated.   - Continue MSSA   - Folvite, multi-vites, thiamine supplementation   - Further management per Psychiatry      # Chronic pain  - 20 year history of all over body pain. Has been seen by various specialists and rheumatology. No diagnosis. No change in symptoms.   - meeting with pain clinic next week     # Mild hypernatremia  - likely 2/2 dehydration  - will trend NA, encourage fluids     # Mild leukopenia   - repeat     # Other   - multiple vitamins on med list. Med rec pending. Ok to resume at discharge or order here if patient prefers.          Hospital Course:   Holly Briseno was admitted to Station 3[facility] with attending Jem Lew MD as a voluntary patient. The patient was placed under status 15 (15 minute checks) to ensure patient safety.   MSSA protocol was initiated due to the patient's history of alcohol abuse and concern for withdrawal symptoms.  CBC, BMP and utox obtained.    All outpatient medications were continued. The patient will titrate Cymbalta back up to 120mg Qday. The patient will resume Campral at discharge.     Holly Briseno did participate in groups and was visible in the milieu.     The patient's symptoms of withdrawal improved.     Holly Briseno was released to home. At the time of discharge Holly Briseno was determined to not be a danger to herself or others. At the  current time of discharge, the patient does not meet criteria for involuntary hospitalization. On the day of discharge, the patient reports that they do not have suicidal or homicidal ideation and would never hurt themselves or others. Steps taken to minimize risk include: assessing patient s behavior and thought process daily during hospital stay, discharging patient with adequate plan for follow up for mental and physical health and discussing safety plan of returning to the hospital should the patient ever have thoughts of harming themselves or others. Therefore, based on all available evidence including the factors cited above, the patient does not appear to be at imminent risk for self-harm, and is appropriate for outpatient level of care.           Discharge Medications:     Current Discharge Medication List        CONTINUE these medications which have NOT CHANGED    Details   acamprosate (CAMPRAL) 333 MG EC tablet Take 333 mg by mouth 3 times daily      ASHWAGANDHA PO Take 300 mg by mouth daily      Biotin 1000 MCG CHEW Take 1,000 mcg by mouth daily      Cranberry (RA CRANBERRY) 500 MG CAPS Take 500 mg by mouth daily      DULoxetine (CYMBALTA) 60 MG capsule Take 60 mg by mouth 2 times daily      fluticasone (FLONASE) 50 MCG/ACT nasal spray Spray 1 spray into both nostrils daily as needed      hydrOXYzine (ATARAX) 10 MG tablet Take 10-20 mg by mouth every 8 hours as needed for anxiety or other (sleep)      ibuprofen (ADVIL/MOTRIN) 600 MG tablet Take 600 mg by mouth every 4 hours as needed for moderate pain      magnesium oxide 400 MG CAPS Take 1 tablet by mouth 2 times daily      Menatetrenone (VITAMIN K2) 100 MCG TABS Take 100 mcg by mouth daily      methocarbamol (ROBAXIN) 500 MG tablet Take 2 tablets by mouth every 8 hours as needed for muscle spasms      Potassium Citrate,Elemental K, 99 MG CAPS Take 1 capsule by mouth daily      thiamine (B-1) 100 MG tablet Take 1 tablet (100 mg) by mouth daily  Qty: 30  tablet, Refills: 0    Associated Diagnoses: Alcohol use with withdrawal (H)      Turmeric 500 MG CAPS Take 1 capsule by mouth daily      vitamin A 3 MG (73572 UNITS) capsule Take 10,000 Units by mouth daily      vitamin D3 (CHOLECALCIFEROL) 125 MCG (5000 UT) tablet Take 5,000 Units by mouth daily           STOP taking these medications       chlordiazePOXIDE (LIBRIUM) 25 MG capsule Comments:   Reason for Stopping:                    Psychiatric Examination:   Appearance:  awake, alert and adequately groomed  Attitude:  cooperative  Eye Contact:  good  Mood:  better  Affect:  mood congruent  Speech:  clear, coherent  Psychomotor Behavior:  no evidence of tardive dyskinesia, dystonia, or tics  Thought Process:  goal oriented  Associations:  no loose associations  Thought Content:  no evidence of suicidal ideation or homicidal ideation and no evidence of psychotic thought  Insight:  fair  Judgment:  fair  Oriented to:  time, person, and place  Attention Span and Concentration:  intact  Recent and Remote Memory:  fair  Language: Able to read and write  Fund of Knowledge: appropriate  Muscle Strength and Tone: normal  Gait and Station: Normal         Discharge Plan:   Continue medications as above.     Recommendation:  If you are struggling with alcohol or substance abuse, please contact [facility] to schedule an outpatient SUE assessment.      [facility] - Assessments by appointment.  [address] [address]  [phone]     Major Treatments, Procedures and Findings:  You were treated for Valium detoxification using Valium. You did not complete a chemical dependency assessment. You had labs drawn and those results were reviewed with you. Please take a copy of your lab work with you to your next primary care provider appointment.     Symptoms to Report:  If you experience more anxiety, confusion, sleeplessness, deep sadness or thoughts of suicide, notify your treatment  team or notify your primary care provider. IF ANY OF THE SYMPTOMS YOU ARE EXPERIENCING ARE A MEDICAL EMERGENCY CALL 911 IMMEDIATELY.      Lifestyle Adjustment: Adjust your lifestyle to get enough sleep, relaxation, exercise and good nutrition. Continue to develop healthy coping skills to decrease stress and promote a sober living environment. Do not use mood altering substances including alcohol, illegal drugs or addictive medications other than what is currently prescribed.      Disposition: Return home and attend AA meetings     Facts about COVID19 at www.cdc.gov/COVID19 and www.[place].gov/covid19     Keeping hands clean is one of the most important steps we can take to avoid getting sick and spreading germs to others.  Please wash your hands frequently and lather with soap for at least 20 seconds!     Follow-up Appointment: PLEASE ARRIVE  MINUTES EARLY  Appointment Date/Time: Tuesday, July 30 th at 2:40 PM     Psychiatrist/Primary Care Giver:   Dr. Lizzeth Mera  [facility]         Address:   [place] [address]      Phone Number:   [phone] (Work)   [phone] (Fax)    Attestation:    The patient was seen and evaluated by me. I spent more than 30 minutes on discharge day activities. Jem Lew MD

## 2024-07-27 NOTE — PROGRESS NOTES
Brief medicine note. Labs reviewed. NA and white count normalized. No acute medical concerns. Medicine will sign off at this time. Please reach out with questions or concerns.

## 2024-07-27 NOTE — PLAN OF CARE
The Patient is alert and oriented in all four spheres/areas. Her mood was calm, with a full range of affect. She denied any suicidal or homicidal thoughts, auditory or visual hallucinations, depression, anxiety, constipation, abnormal sleeping patterns, and poor appetite. The Patient took Tylenol for generalized pain( Pain all over her body), which provided relief. In terms of her safety plan after being discharged, the Patient confirmed that she had no access to guns and that her family and friends were her support system. She agreed to call someone or return to the hospital if she ever felt the urge to harm herself, even though she did not think she would ever feel that way. The Writer and Patient discussed and reviewed the Patient summary, and the Patient expressed understanding of the education provided, including how to administer her medication. The Patient received her belongings and lab records and proceeded to her home. She did not carry medications with her because she had the medications at home. Her son from [place], [place], will transport her. A team member escorted her to the [facility] Entr[facility] of the Hospital at 1:15 PM.

## 2024-07-28 ENCOUNTER — HEALTH MAINTENANCE LETTER (OUTPATIENT)
Age: 54
End: 2024-07-28

## 2024-07-31 ENCOUNTER — PATIENT OUTREACH (OUTPATIENT)
Dept: CARE COORDINATION | Facility: CLINIC | Age: 54
End: 2024-07-31
Payer: MEDICARE

## 2024-07-31 NOTE — PROGRESS NOTES
[facility]    Background: Transitional Care Management program identified per system criteria and reviewed by [facility] team for possible outreach.    Assessment:  Upon chart review on 7/31, DEC coordinator also made outreach attempt to patient for reason of discussing hospital follow up plan of care and answering questions patient may have related to discharge instructions. [facility] Team member will update episode status of Transitional Care Management program to enrolled per system workflows.     [facility] made first outreach attempt on 7/29/24 to reach patient for hospital follow up and left message and that time.    Plan: [facility] will make no additional outreach attempts to minimize duplicative efforts and reduce potential confusion for patient.      Alicia Moyer RN  [facility] Resource [place]    *Connected Care Resource Team does NOT follow patient ongoing. Referrals are identified based on internal discharge reports and the outreach is to ensure patient has an understanding of their discharge instructions.

## 2024-08-07 ENCOUNTER — HOSPITAL ENCOUNTER (OUTPATIENT)
Dept: MRI IMAGING | Facility: HOSPITAL | Age: 54
Discharge: HOME | End: 2024-08-07
Attending: STUDENT IN AN ORGANIZED HEALTH CARE EDUCATION/TRAINING PROGRAM | Admitting: STUDENT IN AN ORGANIZED HEALTH CARE EDUCATION/TRAINING PROGRAM
Payer: MEDICARE

## 2024-08-07 DIAGNOSIS — M79.10 MYALGIA: ICD-10-CM

## 2024-08-07 PROCEDURE — A9585 GADOBUTROL INJECTION: HCPCS | Performed by: STUDENT IN AN ORGANIZED HEALTH CARE EDUCATION/TRAINING PROGRAM

## 2024-08-07 PROCEDURE — 255N000002 HC RX 255 OP 636: Performed by: STUDENT IN AN ORGANIZED HEALTH CARE EDUCATION/TRAINING PROGRAM

## 2024-08-07 PROCEDURE — G1010 CDSM STANSON: HCPCS

## 2024-08-07 RX ORDER — GADOBUTROL 604.72 MG/ML
0.1 INJECTION INTRAVENOUS ONCE
Status: COMPLETED | OUTPATIENT
Start: 2024-08-07 | End: 2024-08-07

## 2024-08-07 RX ADMIN — GADOBUTROL 6 ML: 604.72 INJECTION INTRAVENOUS at 13:13

## 2024-08-14 DIAGNOSIS — M54.2 CERVICAL PAIN (NECK): Primary | ICD-10-CM

## 2024-08-14 DIAGNOSIS — M54.2 CERVICAL PAIN: Primary | ICD-10-CM

## 2025-05-26 ENCOUNTER — HOSPITAL ENCOUNTER (EMERGENCY)
Facility: CLINIC | Age: 55
Discharge: HOME | End: 2025-05-26
Attending: FAMILY MEDICINE
Payer: MEDICARE

## 2025-05-26 VITALS
TEMPERATURE: 98.4 F | DIASTOLIC BLOOD PRESSURE: 82 MMHG | HEART RATE: 75 BPM | OXYGEN SATURATION: 97 % | SYSTOLIC BLOOD PRESSURE: 135 MMHG | RESPIRATION RATE: 17 BRPM

## 2025-05-26 DIAGNOSIS — R45.851 SUICIDAL IDEATION: ICD-10-CM

## 2025-05-26 DIAGNOSIS — F10.920 ALCOHOLIC INTOXICATION WITHOUT COMPLICATION: ICD-10-CM

## 2025-05-26 PROBLEM — F10.90 ALCOHOL USE DISORDER: Status: ACTIVE | Noted: 2023-08-31

## 2025-05-26 LAB
ALBUMIN SERPL BCG-MCNC: 4.5 G/DL (ref 3.5–5.2)
ALP SERPL-CCNC: 72 U/L (ref 40–150)
ALT SERPL W P-5'-P-CCNC: 27 U/L (ref 0–50)
AMPHETAMINES UR QL SCN: NORMAL
ANION GAP SERPL CALCULATED.3IONS-SCNC: 17 MMOL/L (ref 7–15)
AST SERPL W P-5'-P-CCNC: 32 U/L (ref 0–45)
BARBITURATES UR QL SCN: NORMAL
BASOPHILS # BLD AUTO: 0 10E3/UL (ref 0–0.2)
BASOPHILS NFR BLD AUTO: 1 %
BENZODIAZ UR QL SCN: NORMAL
BILIRUB SERPL-MCNC: <0.2 MG/DL
BUN SERPL-MCNC: 8.4 MG/DL (ref 6–20)
BZE UR QL SCN: NORMAL
CALCIUM SERPL-MCNC: 8.4 MG/DL (ref 8.8–10.4)
CANNABINOIDS UR QL SCN: NORMAL
CHLORIDE SERPL-SCNC: 108 MMOL/L (ref 98–107)
CREAT SERPL-MCNC: 0.89 MG/DL (ref 0.51–0.95)
EGFRCR SERPLBLD CKD-EPI 2021: 78 ML/MIN/1.73M2
EOSINOPHIL # BLD AUTO: 0.1 10E3/UL (ref 0–0.7)
EOSINOPHIL NFR BLD AUTO: 3 %
ERYTHROCYTE [DISTWIDTH] IN BLOOD BY AUTOMATED COUNT: 12.6 % (ref 10–15)
ETHANOL SERPL-MCNC: 0.33 G/DL
FENTANYL UR QL: NORMAL
GLUCOSE SERPL-MCNC: 88 MG/DL (ref 70–99)
HCG UR QL: NEGATIVE
HCO3 SERPL-SCNC: 22 MMOL/L (ref 22–29)
HCT VFR BLD AUTO: 43.4 % (ref 35–47)
HGB BLD-MCNC: 14.3 G/DL (ref 11.7–15.7)
IMM GRANULOCYTES # BLD: 0 10E3/UL
IMM GRANULOCYTES NFR BLD: 0 %
LYMPHOCYTES # BLD AUTO: 1.9 10E3/UL (ref 0.8–5.3)
LYMPHOCYTES NFR BLD AUTO: 47 %
MCH RBC QN AUTO: 29.6 PG (ref 26.5–33)
MCHC RBC AUTO-ENTMCNC: 32.9 G/DL (ref 31.5–36.5)
MCV RBC AUTO: 90 FL (ref 78–100)
MONOCYTES # BLD AUTO: 0.3 10E3/UL (ref 0–1.3)
MONOCYTES NFR BLD AUTO: 6 %
NEUTROPHILS # BLD AUTO: 1.8 10E3/UL (ref 1.6–8.3)
NEUTROPHILS NFR BLD AUTO: 44 %
OPIATES UR QL SCN: NORMAL
PCP QUAL URINE (ROCHE): NORMAL
PLATELET # BLD AUTO: 239 10E3/UL (ref 150–450)
POTASSIUM SERPL-SCNC: 3.7 MMOL/L (ref 3.4–5.3)
PROT SERPL-MCNC: 7.4 G/DL (ref 6.4–8.3)
RBC # BLD AUTO: 4.83 10E6/UL (ref 3.8–5.2)
SODIUM SERPL-SCNC: 147 MMOL/L (ref 135–145)
WBC # BLD AUTO: 4.1 10E3/UL (ref 4–11)

## 2025-05-26 PROCEDURE — 250N000013 HC RX MED GY IP 250 OP 250 PS 637: Performed by: EMERGENCY MEDICINE

## 2025-05-26 PROCEDURE — 82040 ASSAY OF SERUM ALBUMIN: CPT | Performed by: FAMILY MEDICINE

## 2025-05-26 PROCEDURE — 81025 URINE PREGNANCY TEST: CPT | Performed by: FAMILY MEDICINE

## 2025-05-26 PROCEDURE — 36415 COLL VENOUS BLD VENIPUNCTURE: CPT | Performed by: FAMILY MEDICINE

## 2025-05-26 PROCEDURE — 96360 HYDRATION IV INFUSION INIT: CPT | Performed by: FAMILY MEDICINE

## 2025-05-26 PROCEDURE — 99285 EMERGENCY DEPT VISIT HI MDM: CPT | Mod: 25 | Performed by: FAMILY MEDICINE

## 2025-05-26 PROCEDURE — 258N000003 HC RX IP 258 OP 636: Performed by: FAMILY MEDICINE

## 2025-05-26 PROCEDURE — 80307 DRUG TEST PRSMV CHEM ANLYZR: CPT | Performed by: FAMILY MEDICINE

## 2025-05-26 PROCEDURE — 99285 EMERGENCY DEPT VISIT HI MDM: CPT | Performed by: FAMILY MEDICINE

## 2025-05-26 PROCEDURE — 85018 HEMOGLOBIN: CPT | Performed by: FAMILY MEDICINE

## 2025-05-26 PROCEDURE — 82077 ASSAY SPEC XCP UR&BREATH IA: CPT | Performed by: FAMILY MEDICINE

## 2025-05-26 RX ORDER — DIAZEPAM 5 MG/1
5 TABLET ORAL ONCE
Status: COMPLETED | OUTPATIENT
Start: 2025-05-26 | End: 2025-05-26

## 2025-05-26 RX ORDER — SODIUM CHLORIDE 9 MG/ML
1000 INJECTION, SOLUTION INTRAVENOUS CONTINUOUS
Status: DISCONTINUED | OUTPATIENT
Start: 2025-05-26 | End: 2025-05-27 | Stop reason: HOSPADM

## 2025-05-26 RX ORDER — ACETAMINOPHEN 500 MG
1000 TABLET ORAL ONCE
Status: COMPLETED | OUTPATIENT
Start: 2025-05-26 | End: 2025-05-26

## 2025-05-26 RX ADMIN — DIAZEPAM 5 MG: 5 TABLET ORAL at 20:16

## 2025-05-26 RX ADMIN — SODIUM CHLORIDE 1000 ML: 0.9 INJECTION, SOLUTION INTRAVENOUS at 07:36

## 2025-05-26 RX ADMIN — ACETAMINOPHEN 1000 MG: 500 TABLET, FILM COATED ORAL at 19:59

## 2025-05-26 ASSESSMENT — LIFESTYLE VARIABLES
VISUAL DISTURBANCES: NOT PRESENT
ORIENTATION AND CLOUDING OF SENSORIUM: ORIENTED AND CAN DO SERIAL ADDITIONS
AUDITORY DISTURBANCES: NOT PRESENT
HEADACHE, FULLNESS IN HEAD: SEVERE
AGITATION: SOMEWHAT MORE THAN NORMAL ACTIVITY
PAROXYSMAL SWEATS: 3
ANXIETY: MILDLY ANXIOUS
TREMOR: 3
NAUSEA AND VOMITING: MILD NAUSEA WITH NO VOMITING
TOTAL SCORE: 14

## 2025-05-26 ASSESSMENT — ACTIVITIES OF DAILY LIVING (ADL)
ADLS_ACUITY_SCORE: 48

## 2025-05-26 ASSESSMENT — COLUMBIA-SUICIDE SEVERITY RATING SCALE - C-SSRS
2. HAVE YOU ACTUALLY HAD ANY THOUGHTS OF KILLING YOURSELF IN THE PAST MONTH?: NO
1. IN THE PAST MONTH, HAVE YOU WISHED YOU WERE DEAD OR WISHED YOU COULD GO TO SLEEP AND NOT WAKE UP?: NO
6. HAVE YOU EVER DONE ANYTHING, STARTED TO DO ANYTHING, OR PREPARED TO DO ANYTHING TO END YOUR LIFE?: NO

## 2025-05-26 NOTE — ED NOTES
Contacted ED regarding patient readiness. RN states patient ready for DEC assessment as of this time. ETA provided of 19:30.     BRANDAN Bhandari  DEC ,      DEC Phone #: [phone]

## 2025-05-26 NOTE — ED PROVIDER NOTES
Holly is a 54 year old female who came in intoxicated and uncooperative.     Is now on a health officer hold. Patient is unwilling to answer questions including whether or not she's suicidal. She was noncompliant with DEC assessment earlier.     Essentially she made suicidal statements to her neighbor earlier prior to arrival in ED so there is some concern for being suicidal    Patient's daughter called at [phone] and stated that she is worried about the patient drinking and it killing her.  Patient had a DEC assessment at about 8:00 PM.  DEC  spoke with the patient as well as patient's daughter.  Very clear that patient is not feeling suicidal at this time she is feeling much better now that she is sober.  DEC  also spoke with the patient's daughter about her statements about the patient drinking to kill her self.  Very specifically the daughter is concerned about the patient drinking so much that she causes endorgan damage and ends up killing herself on accident.  I spoke at length with the patient and she states she has no suicidal ideation at the time and she plans to go to a meeting tomorrow to stay sober.  She has been sober in the past and she plans to reengage in sobriety.  She understands some of the mistakes that were made last night and she clearly expresses goal oriented wishes.  Discharging patient.         Subha Her,   05/27/25 0113

## 2025-05-26 NOTE — ED NOTES
5/26/2025  Holly Briseno 1970     Writer consulted with ED provider. It was determined that pt would not benefit from assessment at this time due to Pt unable able to participate in assessment.     ED will call DEC at [phone] when pt is ready and able to participate in assessment.       Umer Guzman

## 2025-05-26 NOTE — ED TRIAGE NOTES
Presents to ED with suicidal ideations while intoxicated with 800 mL's total of Fireball Whiskey since 1600 5/25.     Patient was at home (apartment) talking to neighbor on phone and mentioned suicidal comments. Neighbor called police and patient mentioned same comments towards police. Patient states she doesn't know why she is here, denies alcohol consumption, and denies suicidal comments.    Personal belongings noted and removed from room clothing, Cell phone, vape, car keys, ring, one necklace, and one earing.

## 2025-05-26 NOTE — ED NOTES
"Patient's daughter Mehnaz called to get an update on patient.  Let her know that patient will be getting a DEC assessment at approximately 1930.  Mehnaz stated, if she goes home, she will keep doing this.  She drinks to kill herself\".   "
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
## 2025-05-26 NOTE — ED NOTES
RN and MD at bedside discussing plan of care. Pt encouraged to participate in her DEC assessment. Pt said she is a DNR and she doesn't want any of our help. Pt aware she is on a hold.

## 2025-05-27 NOTE — DISCHARGE INSTRUCTIONS
Please stop drinking, please try going back to meetings tomorrow.  Come back with any medical concerns.  Also if you feel unsafe please come to the ER would be happy to help.
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
## 2025-05-27 NOTE — PLAN OF CARE
Holly Briseno  May 26, 2025  Plan of Care Hand-off Note     Patient Recommended Care Path: discharge    Clinical Substantiation:  Upon completion of assessment and in consultation with attending provider, the pt s circumstances and mental state appear to be appropriate for outpatient management. It is the recommendation of this clinician that pt discharge with OP MH support. Pt is not presenting as an acute risk to self or others as they are able to appropriately engage with clinician and make appropriate plans moving forward.  Patient expresses desire to engage with her outpatient supports and get to an AA meeting as soon as possible.  Patient will follow-up with her therapist and psychiatrist regarding any changes necessary for her ongoing treatment.    Goals for crisis stabilization:  Assess for safety    Next steps for Care Team:  Discharged to follow-up with outpatient supports.    Treatment Objectives Addressed:  rapport building, safety planning, processing feelings, identifying additional supports    Therapeutic Interventions:  Engaged in safety planning    Has a specific means been identified for suicidal.homicide actions:    If yes, describe:    Explain action steps toward mitigation:    Document completion of mitigation action:    The follow up action still needed prior to discharge:      Patient coping skills attempted to reduce the crisis:  None                          Collateral contact information:  Daughter: Mehnaz [phone]    Legal Status: Voluntary/Patient has signed consent for treatment                                                                                                                                 Reviewed court records: yes     Psychiatry Consult:     IDALMIS Bauman

## 2025-06-21 ENCOUNTER — HOSPITAL ENCOUNTER (EMERGENCY)
Facility: CLINIC | Age: 55
Discharge: PSYCHIATRIC OR SUBSTANCE USE FACILITY | End: 2025-06-21
Attending: EMERGENCY MEDICINE | Admitting: EMERGENCY MEDICINE
Payer: MEDICARE

## 2025-06-21 VITALS
OXYGEN SATURATION: 98 % | TEMPERATURE: 98.4 F | RESPIRATION RATE: 22 BRPM | SYSTOLIC BLOOD PRESSURE: 98 MMHG | DIASTOLIC BLOOD PRESSURE: 70 MMHG | HEART RATE: 88 BPM

## 2025-06-21 DIAGNOSIS — F10.920 ALCOHOLIC INTOXICATION WITHOUT COMPLICATION: ICD-10-CM

## 2025-06-21 DIAGNOSIS — R41.82 ALTERED MENTAL STATUS, UNSPECIFIED ALTERED MENTAL STATUS TYPE: ICD-10-CM

## 2025-06-21 DIAGNOSIS — F10.90 ALCOHOL USE DISORDER: ICD-10-CM

## 2025-06-21 LAB
ALBUMIN SERPL BCG-MCNC: 3.8 G/DL (ref 3.5–5.2)
ALP SERPL-CCNC: 86 U/L (ref 40–150)
ALT SERPL W P-5'-P-CCNC: 211 U/L (ref 0–50)
ANION GAP SERPL CALCULATED.3IONS-SCNC: 17 MMOL/L (ref 7–15)
AST SERPL W P-5'-P-CCNC: 412 U/L (ref 0–45)
BASOPHILS # BLD AUTO: 0 10E3/UL (ref 0–0.2)
BASOPHILS NFR BLD AUTO: 0 %
BILIRUB SERPL-MCNC: 0.8 MG/DL
BUN SERPL-MCNC: 14.8 MG/DL (ref 6–20)
CALCIUM SERPL-MCNC: 8.2 MG/DL (ref 8.8–10.4)
CHLORIDE SERPL-SCNC: 97 MMOL/L (ref 98–107)
CREAT SERPL-MCNC: 0.89 MG/DL (ref 0.51–0.95)
EGFRCR SERPLBLD CKD-EPI 2021: 78 ML/MIN/1.73M2
EOSINOPHIL # BLD AUTO: 0.1 10E3/UL (ref 0–0.7)
EOSINOPHIL NFR BLD AUTO: 1 %
ERYTHROCYTE [DISTWIDTH] IN BLOOD BY AUTOMATED COUNT: 13 % (ref 10–15)
ETHANOL SERPL-MCNC: 0.35 G/DL
GLUCOSE SERPL-MCNC: 100 MG/DL (ref 70–99)
HCO3 SERPL-SCNC: 20 MMOL/L (ref 22–29)
HCT VFR BLD AUTO: 37.2 % (ref 35–47)
HGB BLD-MCNC: 12.8 G/DL (ref 11.7–15.7)
IMM GRANULOCYTES # BLD: 0 10E3/UL
IMM GRANULOCYTES NFR BLD: 0 %
LYMPHOCYTES # BLD AUTO: 2.8 10E3/UL (ref 0.8–5.3)
LYMPHOCYTES NFR BLD AUTO: 27 %
MAGNESIUM SERPL-MCNC: 2.1 MG/DL (ref 1.7–2.3)
MCH RBC QN AUTO: 29.9 PG (ref 26.5–33)
MCHC RBC AUTO-ENTMCNC: 34.4 G/DL (ref 31.5–36.5)
MCV RBC AUTO: 87 FL (ref 78–100)
MONOCYTES # BLD AUTO: 0.3 10E3/UL (ref 0–1.3)
MONOCYTES NFR BLD AUTO: 3 %
NEUTROPHILS # BLD AUTO: 7 10E3/UL (ref 1.6–8.3)
NEUTROPHILS NFR BLD AUTO: 68 %
NRBC # BLD AUTO: 0 10E3/UL
NRBC BLD AUTO-RTO: 0 /100
PLATELET # BLD AUTO: 203 10E3/UL (ref 150–450)
POTASSIUM SERPL-SCNC: 3.5 MMOL/L (ref 3.4–5.3)
PROT SERPL-MCNC: 6.2 G/DL (ref 6.4–8.3)
RBC # BLD AUTO: 4.28 10E6/UL (ref 3.8–5.2)
SODIUM SERPL-SCNC: 134 MMOL/L (ref 135–145)
WBC # BLD AUTO: 10.3 10E3/UL (ref 4–11)

## 2025-06-21 PROCEDURE — 85004 AUTOMATED DIFF WBC COUNT: CPT | Performed by: EMERGENCY MEDICINE

## 2025-06-21 PROCEDURE — 82077 ASSAY SPEC XCP UR&BREATH IA: CPT | Performed by: EMERGENCY MEDICINE

## 2025-06-21 PROCEDURE — 99285 EMERGENCY DEPT VISIT HI MDM: CPT | Performed by: EMERGENCY MEDICINE

## 2025-06-21 PROCEDURE — 83735 ASSAY OF MAGNESIUM: CPT | Performed by: EMERGENCY MEDICINE

## 2025-06-21 PROCEDURE — 80053 COMPREHEN METABOLIC PANEL: CPT | Performed by: EMERGENCY MEDICINE

## 2025-06-21 PROCEDURE — 36415 COLL VENOUS BLD VENIPUNCTURE: CPT | Performed by: EMERGENCY MEDICINE

## 2025-06-21 RX ORDER — OLANZAPINE 10 MG/2ML
10 INJECTION, POWDER, FOR SOLUTION INTRAMUSCULAR 2 TIMES DAILY PRN
Status: DISCONTINUED | OUTPATIENT
Start: 2025-06-21 | End: 2025-06-21 | Stop reason: HOSPADM

## 2025-06-21 RX ORDER — ACETAMINOPHEN 325 MG/1
650 TABLET ORAL EVERY 4 HOURS PRN
Status: DISCONTINUED | OUTPATIENT
Start: 2025-06-21 | End: 2025-06-21 | Stop reason: HOSPADM

## 2025-06-21 ASSESSMENT — COLUMBIA-SUICIDE SEVERITY RATING SCALE - C-SSRS
1. IN THE PAST MONTH, HAVE YOU WISHED YOU WERE DEAD OR WISHED YOU COULD GO TO SLEEP AND NOT WAKE UP?: NO
2. HAVE YOU ACTUALLY HAD ANY THOUGHTS OF KILLING YOURSELF IN THE PAST MONTH?: NO
6. HAVE YOU EVER DONE ANYTHING, STARTED TO DO ANYTHING, OR PREPARED TO DO ANYTHING TO END YOUR LIFE?: NO

## 2025-06-21 ASSESSMENT — ACTIVITIES OF DAILY LIVING (ADL)
ADLS_ACUITY_SCORE: 48

## 2025-06-21 NOTE — ED PROVIDER NOTES
History     Chief Complaint   Patient presents with    Alcohol Intoxication    Suicidal     HPI  Holly Briseno is a 54 year old female with a history of alcohol use disorder who presents for altered mental status.  The patient admits to drinking alcohol today, says she is struggled with alcohol use for quite some time.  She reports that she has been trying to drink herself to death but it is not working.  She denies any other means to try to kill herself.  She says she wants to die but she is not suicidal.  She says she wants help with her alcohol use and wants to be institutionalized.    Allergies:  Allergies   Allergen Reactions    Amoxicillin Rash     Per the Antimicrobial Stewardship Team 10/19:  No similarities in side chains for Amoxicillin and Ancef, very low to no risk of cross-reactivity.    Sulfa Antibiotics Hives, Itching and Rash       Problem List:    Patient Active Problem List    Diagnosis Date Noted    Benzodiazepine withdrawal (H) 07/26/2024     Priority: Medium    Alcohol withdrawal with complication with inpatient treatment, with unspecified complication (H) 07/26/2024     Priority: Medium    Alcohol withdrawal, uncomplicated (H) 01/06/2024     Priority: Medium    Arthralgia, unspecified joint 08/31/2023     Priority: Medium    Alcohol use disorder 08/31/2023     Priority: Medium    Alcohol use disorder, moderate, dependence (H) 08/13/2023     Priority: Medium    Alcohol intoxication, uncomplicated 08/13/2023     Priority: Medium    Intermittent palpitations 12/08/2022     Priority: Medium    Chronic migraine with aura 08/17/2022     Priority: Medium     Formatting of this note might be different from the original.  had MRI brain approx 2006 by neurologist  Tried imitrex and Topamax and amitriptyline and side effects and no help      Alcohol use with withdrawal (H) 02/04/2021     Priority: Medium    Anxiety 02/04/2021     Priority: Medium    Major depressive disorder, recurrent, moderate (H)  02/04/2021     Priority: Medium    Suicidal ideation 02/03/2021     Priority: Medium        Past Medical History:    Past Medical History:   Diagnosis Date    Alcoholism (H)     Anxiety     Cancer (H) September 2021    Depressive disorder February 3 2021       Past Surgical History:    Past Surgical History:   Procedure Laterality Date    BIOPSY  September 2021    Melanoma       Family History:    Family History   Problem Relation Age of Onset    Substance Abuse Mother     Substance Abuse Father        Social History:  Marital Status:   [5]  Social History     Tobacco Use    Smoking status: Never    Smokeless tobacco: Never   Vaping Use    Vaping status: Never Used   Substance Use Topics    Alcohol use: Not Currently    Drug use: Never        Medications:    acamprosate (CAMPRAL) 333 MG EC tablet  ASHWAGANDHA PO  Biotin 1000 MCG CHEW  Cranberry (RA CRANBERRY) 500 MG CAPS  DULoxetine (CYMBALTA) 60 MG capsule  fluticasone (FLONASE) 50 MCG/ACT nasal spray  hydrOXYzine (ATARAX) 10 MG tablet  ibuprofen (ADVIL/MOTRIN) 600 MG tablet  magnesium oxide 400 MG CAPS  Menatetrenone (VITAMIN K2) 100 MCG TABS  methocarbamol (ROBAXIN) 500 MG tablet  Potassium Citrate,Elemental K, 99 MG CAPS  thiamine (B-1) 100 MG tablet  Turmeric 500 MG CAPS  vitamin A 3 MG (86206 UNITS) capsule  vitamin D3 (CHOLECALCIFEROL) 125 MCG (5000 UT) tablet          Review of Systems    Physical Exam   BP: 98/70  Pulse: 88  Temp: 98.4  F (36.9  C)  Resp: 22  SpO2: 98 %      Physical Exam  Constitutional:       General: She is not in acute distress.     Appearance: She is well-developed.   HENT:      Head: Normocephalic and atraumatic.   Cardiovascular:      Rate and Rhythm: Normal rate.   Pulmonary:      Effort: No respiratory distress.      Breath sounds: No stridor.   Skin:     General: Skin is warm and dry.   Neurological:      Mental Status: She is alert.      Comments: Moving all 4 extremities equally.  Cerebellar dysfunction   Psychiatric:          Speech: Speech is slurred.         ED Course        Procedures              Critical Care time:  none     None         Results for orders placed or performed during the hospital encounter of 06/21/25 (from the past 24 hours)   Urine Drug Screen *Canceled*    Narrative    The following orders were created for panel order Urine Drug Screen.  Procedure                               Abnormality         Status                     ---------                               -----------         ------                       Please view results for these tests on the individual orders.   CBC with Platelets & Differential    Narrative    The following orders were created for panel order CBC with Platelets & Differential.  Procedure                               Abnormality         Status                     ---------                               -----------         ------                     CBC with platelets and ...[[id number]]                      Final result                 Please view results for these tests on the individual orders.   Comprehensive metabolic panel   Result Value Ref Range    Sodium 134 (L) 135 - 145 mmol/L    Potassium 3.5 3.4 - 5.3 mmol/L    Carbon Dioxide (CO2) 20 (L) 22 - 29 mmol/L    Anion Gap 17 (H) 7 - 15 mmol/L    Urea Nitrogen 14.8 6.0 - 20.0 mg/dL    Creatinine 0.89 0.51 - 0.95 mg/dL    GFR Estimate 78 >60 mL/min/1.73m2    Calcium 8.2 (L) 8.8 - 10.4 mg/dL    Chloride 97 (L) 98 - 107 mmol/L    Glucose 100 (H) 70 - 99 mg/dL    Alkaline Phosphatase 86 40 - 150 U/L     (H) 0 - 45 U/L     (H) 0 - 50 U/L    Protein Total 6.2 (L) 6.4 - 8.3 g/dL    Albumin 3.8 3.5 - 5.2 g/dL    Bilirubin Total 0.8 <=1.2 mg/dL   Magnesium   Result Value Ref Range    Magnesium 2.1 1.7 - 2.3 mg/dL   Ethanol Level Blood   Result Value Ref Range    Ethanol Level Blood 0.35 (HH) <=0.01 g/dL   CBC with platelets and differential   Result Value Ref Range    WBC Count 10.3 4.0 - 11.0 10e3/uL    RBC Count 4.28  3.80 - 5.20 10e6/uL    Hemoglobin 12.8 11.7 - 15.7 g/dL    Hematocrit 37.2 35.0 - 47.0 %    MCV 87 78 - 100 fL    MCH 29.9 26.5 - 33.0 pg    MCHC 34.4 31.5 - 36.5 g/dL    RDW 13.0 10.0 - 15.0 %    Platelet Count 203 150 - 450 10e3/uL    % Neutrophils 68 %    % Lymphocytes 27 %    % Monocytes 3 %    % Eosinophils 1 %    % Basophils 0 %    % Immature Granulocytes 0 %    NRBCs per 100 WBC 0 <1 /100    Absolute Neutrophils 7.0 1.6 - 8.3 10e3/uL    Absolute Lymphocytes 2.8 0.8 - 5.3 10e3/uL    Absolute Monocytes 0.3 0.0 - 1.3 10e3/uL    Absolute Eosinophils 0.1 0.0 - 0.7 10e3/uL    Absolute Basophils 0.0 0.0 - 0.2 10e3/uL    Absolute Immature Granulocytes 0.0 <=0.4 10e3/uL    Absolute NRBCs 0.0 10e3/uL       Medications - No data to display    Assessments & Plan (with Medical Decision Making)   54-year-old female with history of alcohol use disorder who presents with altered mental status.  Admits to drinking alcohol.  Her serum ethanol level 0.35.  Electrolytes within normal limits with exception of mildly elevated anion gap of 17 and bicarb of 20.  White blood succumbs 10.3 and hemoglobin 12.8.  She is tolerating oral intake here.  She requires hospitalization for detox, I have followed an appropriate detox bed for her through [place] [facility] and the patient is transferred there for further treatment.    I have reviewed the nursing notes.    I have reviewed the findings, diagnosis, plan and need for follow up with the patient.             Discharge Medication List as of 6/21/2025  7:59 PM          Final diagnoses:   Altered mental status, unspecified altered mental status type   Alcoholic intoxication without complication   Alcohol use disorder       6/21/2025   [facility] EMERGENCY DEPT       Gary Briones MD  06/22/25 0110

## 2025-06-21 NOTE — ED TRIAGE NOTES
"Patient called 911 and reported having SI to police. Denies having any SI at this time. Pt is uncooperative with staff and refusing to answer questions. States she has been drinking for 3 weeks \"as much as her stomach can handle\".         "

## 2025-08-10 ENCOUNTER — HEALTH MAINTENANCE LETTER (OUTPATIENT)
Age: 55
End: 2025-08-10